# Patient Record
Sex: MALE | NOT HISPANIC OR LATINO | Employment: OTHER | RURAL
[De-identification: names, ages, dates, MRNs, and addresses within clinical notes are randomized per-mention and may not be internally consistent; named-entity substitution may affect disease eponyms.]

---

## 2023-05-17 ENCOUNTER — OFFICE VISIT (OUTPATIENT)
Dept: FAMILY MEDICINE CLINIC | Facility: CLINIC | Age: 73
End: 2023-05-17
Payer: MEDICARE

## 2023-05-17 VITALS
WEIGHT: 249.2 LBS | BODY MASS INDEX: 35.68 KG/M2 | HEIGHT: 70 IN | SYSTOLIC BLOOD PRESSURE: 130 MMHG | DIASTOLIC BLOOD PRESSURE: 72 MMHG | HEART RATE: 91 BPM | RESPIRATION RATE: 16 BRPM | TEMPERATURE: 98.4 F | OXYGEN SATURATION: 98 %

## 2023-05-17 DIAGNOSIS — M54.16 LUMBAR RADICULOPATHY: ICD-10-CM

## 2023-05-17 DIAGNOSIS — Z87.891 HISTORY OF SMOKING: ICD-10-CM

## 2023-05-17 DIAGNOSIS — R01.1 HEART MURMUR: ICD-10-CM

## 2023-05-17 DIAGNOSIS — Z79.4 CONTROLLED TYPE 2 DIABETES MELLITUS WITH HYPERGLYCEMIA, WITH LONG-TERM CURRENT USE OF INSULIN: ICD-10-CM

## 2023-05-17 DIAGNOSIS — F51.01 PRIMARY INSOMNIA: ICD-10-CM

## 2023-05-17 DIAGNOSIS — E11.65 CONTROLLED TYPE 2 DIABETES MELLITUS WITH HYPERGLYCEMIA, WITH LONG-TERM CURRENT USE OF INSULIN: ICD-10-CM

## 2023-05-17 DIAGNOSIS — N18.31 STAGE 3A CHRONIC KIDNEY DISEASE: ICD-10-CM

## 2023-05-17 DIAGNOSIS — E78.5 HYPERLIPIDEMIA, UNSPECIFIED HYPERLIPIDEMIA TYPE: ICD-10-CM

## 2023-05-17 DIAGNOSIS — I10 PRIMARY HYPERTENSION: Primary | ICD-10-CM

## 2023-05-17 DIAGNOSIS — G83.4: ICD-10-CM

## 2023-05-17 RX ORDER — BACLOFEN 5 MG/1
1 TABLET ORAL 3 TIMES DAILY
COMMUNITY
Start: 2023-04-13 | End: 2023-05-24 | Stop reason: SDUPTHER

## 2023-05-17 RX ORDER — FENOFIBRATE 160 MG/1
1 TABLET ORAL DAILY
COMMUNITY
Start: 2023-04-13

## 2023-05-17 RX ORDER — PANTOPRAZOLE SODIUM 40 MG/1
1 TABLET, DELAYED RELEASE ORAL DAILY
COMMUNITY
Start: 2023-04-13

## 2023-05-17 RX ORDER — INSULIN GLARGINE 100 [IU]/ML
INJECTION, SOLUTION SUBCUTANEOUS
COMMUNITY
End: 2023-05-18 | Stop reason: SDUPTHER

## 2023-05-17 RX ORDER — TRAMADOL HYDROCHLORIDE 50 MG/1
TABLET ORAL
COMMUNITY
Start: 2023-05-02 | End: 2023-05-24 | Stop reason: SDUPTHER

## 2023-05-17 RX ORDER — ATORVASTATIN CALCIUM 20 MG/1
1 TABLET, FILM COATED ORAL DAILY
COMMUNITY
Start: 2023-04-13

## 2023-05-17 RX ORDER — METOPROLOL TARTRATE 100 MG/1
1 TABLET ORAL DAILY
COMMUNITY
Start: 2023-04-13

## 2023-05-17 RX ORDER — LORAZEPAM 0.5 MG/1
TABLET ORAL
Qty: 30 TABLET | Refills: 0 | Status: CANCELLED | OUTPATIENT
Start: 2023-05-17

## 2023-05-17 RX ORDER — AMLODIPINE BESYLATE 10 MG/1
1 TABLET ORAL DAILY
COMMUNITY
Start: 2023-04-14

## 2023-05-17 RX ORDER — PREGABALIN 150 MG/1
1 CAPSULE ORAL EVERY 12 HOURS SCHEDULED
COMMUNITY
Start: 2023-04-13

## 2023-05-17 RX ORDER — HYDROCHLOROTHIAZIDE 25 MG/1
TABLET ORAL
COMMUNITY
End: 2023-05-18 | Stop reason: SDUPTHER

## 2023-05-17 RX ORDER — HYDROXYZINE HYDROCHLORIDE 25 MG/1
25 TABLET, FILM COATED ORAL EVERY 8 HOURS PRN
COMMUNITY
Start: 2023-05-13

## 2023-05-17 NOTE — ASSESSMENT & PLAN NOTE
Patient was followed and approved for neurosurgery prior to leaving [place].  MRI reviewed in office today showing L3-5 circumferential disc bulging, ligamentum flavum infolding and facet arthropathy contributing to left subarticular zone impingement of the traversing L5 nerve root, mild spinal canal stenosis and mild bilateral neural foraminal stenosis.   -We will refer to [facility] for further evaluation

## 2023-05-17 NOTE — PROGRESS NOTES
Office Note     Name: Jose A Villanueva    : 1950     MRN: [id number]     Chief Complaint  Establish Care    Subjective     History of Present Illness:  Jose A Villanueva is a 72 y.o. male who presents today to establish care.  He has medical issues including hypertension, lumbar radiculopathy, hyperlipidemia, type 2 diabetes and CKD stage III.  He recently moved to the area from [place].  He checks his fasting a.m. glucose once daily with average readings of 127.  Blood pressure is well controlled, 130/72 in office today.  He is currently utilizing statin and fenofibrate therapy for hyperlipidemia.  He has longstanding pattern of insomnia which has proven to find good benefit from nightly lorazepam.  He reports being followed by nephrology with CKD stage III he is a previous 1-1/2 pack/day smoker, achieving cessation in .  He has well-documented significant lumbar radiculopathy with spinal stenosis.  Followed by neurosurgery in [place], he was preparing to pursue surgical intervention, will need referral to establish care with local neurosurgeon.  Pain currently well controlled with tramadol, baclofen and Lyrica.  He has no particular complaints or concerns today  Review of Systems   Constitutional: Negative for fatigue.   Eyes: Negative for visual disturbance.   Respiratory: Negative for cough, chest tightness, shortness of breath and wheezing.    Cardiovascular: Negative for chest pain, palpitations and leg swelling.   Gastrointestinal: Negative for abdominal pain, constipation, diarrhea, nausea, vomiting and indigestion.   Endocrine: Negative for polydipsia and polyuria.   Genitourinary: Negative for difficulty urinating, flank pain and hematuria.   Musculoskeletal: Positive for arthralgias and back pain.   Neurological: Positive for numbness. Negative for dizziness, weakness and headache.   Hematological: Does not bruise/bleed easily.   Psychiatric/Behavioral: Negative for depressed mood. The patient is not  "nervous/anxious.        Objective     Past Medical History:   Diagnosis Date   • Anxiety    • Arthritis    • Depression    • Diabetes mellitus    • GERD (gastroesophageal reflux disease)    • Hyperlipidemia    • Hypertension      Past Surgical History:   Procedure Laterality Date   • CHOLECYSTECTOMY     • KNEE ARTHROSCOPY Bilateral    • REPLACEMENT TOTAL HIP ONCOLOGIC Right    • TONSILLECTOMY       Family History   Problem Relation Age of Onset   • Arthritis Mother    • Hypertension Mother    • Arthritis Father    • Hypertension Father        Vital Signs  /72 (BP Location: Left arm, Patient Position: Sitting, Cuff Size: Adult)   Pulse 91   Temp 98.4 °F (36.9 °C) (Temporal)   Resp 16   Ht 177.8 cm (70\")   Wt 113 kg (249 lb 3.2 oz)   SpO2 98%   BMI 35.76 kg/m²   Estimated body mass index is 35.76 kg/m² as calculated from the following:    Height as of this encounter: 177.8 cm (70\").    Weight as of this encounter: 113 kg (249 lb 3.2 oz).    Physical Exam  HENT:      Head: Normocephalic and atraumatic.      Right Ear: Tympanic membrane, ear canal and external ear normal.      Left Ear: Tympanic membrane, ear canal and external ear normal.      Nose: Nose normal.      Mouth/Throat:      Pharynx: Oropharynx is clear.   Eyes:      Conjunctiva/sclera: Conjunctivae normal.   Cardiovascular:      Rate and Rhythm: Normal rate and regular rhythm.      Pulses: Normal pulses.      Heart sounds: Murmur heard.   Pulmonary:      Effort: Pulmonary effort is normal.      Breath sounds: Normal breath sounds.   Abdominal:      Palpations: Abdomen is soft.   Musculoskeletal:         General: Tenderness present.      Cervical back: Neck supple.   Skin:     General: Skin is warm and dry.   Neurological:      Mental Status: He is alert and oriented to person, place, and time.   Psychiatric:         Mood and Affect: Mood normal.         Behavior: Behavior normal.         Thought Content: Thought content normal.         " Judgment: Judgment normal.        POCT Results (if applicable):  No results found for this or any previous visit.         Assessment and Plan     Diagnoses and all orders for this visit:    1. Primary hypertension (Primary)  Assessment & Plan:  Excellent control on current medications.  He 130/72 in office today.  1.  Amlodipine 10 mg once daily  2.  Hydrochlorothiazide 25 mg once daily  3.  Metoprolol 100 mg once daily    Orders:  -     TSH Rfx On Abnormal To Free T4; Future    2. Hyperlipidemia, unspecified hyperlipidemia type  Assessment & Plan:  Patient currently utilizing atorvastatin 20 mg once daily and fenofibrate 160 mg once daily    Orders:  -     Hepatitis C Antibody; Future  -     Lipid Panel; Future    3. Lumbar radiculopathy  Assessment & Plan:  Patient was followed and approved for neurosurgery prior to leaving [place].  MRI reviewed in office today showing L3-5 circumferential disc bulging, ligamentum flavum infolding and facet arthropathy contributing to left subarticular zone impingement of the traversing L5 nerve root, mild spinal canal stenosis and mild bilateral neural foraminal stenosis.   -We will refer to [facility] for further evaluation    Orders:  -     Ambulatory Referral to Neurosurgery    4. Incomplete cauda equina syndrome  Assessment & Plan:  Buckling of the cauda equina nerve roots due to lumbar spinal stenosis      5. Controlled type 2 diabetes mellitus with hyperglycemia, with long-term current use of insulin  Assessment & Plan:  Checks glucose once daily. 127 average AM. Taken off metformin by nephrology. Started on tradjenta and insulin.  Checking A1c in office today.  We will need to establish care with local eye doctor, will need evaluation for diabetic retinopathy.    Orders:  -     Hemoglobin A1c; Future    6. Stage 3a chronic kidney disease  Assessment & Plan:  Patient reports being followed by nephrology in [place].  However, he is unsure of his baseline renal  function.  Need to evaluate nephrotoxic agents such as NSAIDs.    Orders:  -     CBC w AUTO Differential; Future  -     Comprehensive Metabolic Panel; Future    7. History of smoking  Assessment & Plan:  Stopped 1999. 20 years 1.5ppd      8. Heart murmur  Assessment & Plan:  Patient reports murmur has been present since birth, he has had full cardiovascular evaluation without abnormal findings per his report.  This included heart catheterization that required no intervention      9. Primary insomnia  Assessment & Plan:  Patient reports longstanding history of insomnia.  He has utilized lorazepam nightly with good benefit.  Reviewed Jack with satisfactory findings.  Reviewed controlled substance agreement, signed by patient.    Orders:  -     LORazepam (Ativan) 0.5 MG tablet; Take 1 tablet by mouth Every 8 (Eight) Hours As Needed for Anxiety.  Dispense: 30 tablet; Refill: 0    Class 2 Severe Obesity (BMI >=35 and <=39.9). Obesity-related health conditions include the following: hypertension, diabetes mellitus and dyslipidemias. Obesity is unchanged. BMI is is above average; BMI management plan is completed. We discussed portion control and increasing exercise.    I spent 45 minutes caring for Jose A on this date of service. This time includes time spent by me in the following activities:preparing for the visit, performing a medically appropriate examination and/or evaluation , counseling and educating the patient/family/caregiver, ordering medications, tests, or procedures and documenting information in the medical record    Vaccine Counseling:  “Discussed risks/benefits to vaccination, reviewed components of the vaccine, discussed VIS, discussed informed consent, informed consent obtained. Patient/Parent was allowed to accept or refuse vaccine. Questions answered to satisfactory state of patient/Parent. We reviewed typical age appropriate and seasonally appropriate vaccinations. Reviewed immunization history and  updated state vaccination form as needed. Patient was counseled on COVID-19  Zoster      Advanced Care Planning:   Patient does not have an advance directive, information provided.    Follow Up  Return in about 3 months (around 8/17/2023) for Annual physical.    Radha Asher, APRN

## 2023-05-17 NOTE — ASSESSMENT & PLAN NOTE
Checks glucose once daily. 127 average AM. Taken off metformin by nephrology. Started on tradjenta and insulin.  Checking A1c in office today.  We will need to establish care with local eye doctor, will need evaluation for diabetic retinopathy.

## 2023-05-18 ENCOUNTER — TELEPHONE (OUTPATIENT)
Dept: FAMILY MEDICINE CLINIC | Facility: CLINIC | Age: 73
End: 2023-05-18
Payer: MEDICARE

## 2023-05-18 NOTE — TELEPHONE ENCOUNTER
Caller: Jose A Villanueva    Relationship: Self    Best call back number: [phone]    Requested Prescriptions:   Requested Prescriptions      No prescriptions requested or ordered in this encounter        Pharmacy where request should be sent: [facility] - [place] - [address] [phone] [facility] [phone] FX     Last office visit with prescribing clinician: 5/17/2023   Last telemedicine visit with prescribing clinician: 5/17/2023   Next office visit with prescribing clinician: 8/17/2023     Additional details provided by patient: PATIENT STATES HE WAS SUPPOSED TO HAVE LANTUS, HYDROCHLOROTHIAZIDE, ATAVAN AND TRAZADONE PRESCRIBED YESTERDAY    Does the patient have less than a 3 day supply:  [] Yes  [] No    Would you like a call back once the refill request has been completed: [x] Yes [] No    If the office needs to give you a call back, can they leave a voicemail: [] Yes [] No    Wendy Mitchell Rep   05/18/23 09:43 EDT

## 2023-05-19 ENCOUNTER — TELEPHONE (OUTPATIENT)
Dept: FAMILY MEDICINE CLINIC | Facility: CLINIC | Age: 73
End: 2023-05-19

## 2023-05-19 ENCOUNTER — TELEPHONE (OUTPATIENT)
Dept: FAMILY MEDICINE CLINIC | Facility: CLINIC | Age: 73
End: 2023-05-19
Payer: MEDICARE

## 2023-05-19 RX ORDER — HYDROCHLOROTHIAZIDE 25 MG/1
25 TABLET ORAL DAILY
Qty: 90 TABLET | Refills: 2 | Status: SHIPPED | OUTPATIENT
Start: 2023-05-19

## 2023-05-19 RX ORDER — INSULIN GLARGINE 100 [IU]/ML
INJECTION, SOLUTION SUBCUTANEOUS
Qty: 15 ML | Refills: 2 | Status: SHIPPED | OUTPATIENT
Start: 2023-05-19

## 2023-05-19 NOTE — TELEPHONE ENCOUNTER
Caller: Jose A Villanueva    Relationship: Self    Best call back number: [phone]    What is the medical concern/diagnosis: SPINAL STENOSIS     What specialty or service is being requested: NEUROSURGEON     What is the provider, practice or medical service name: [facility]KEY [facility]AS RECOMMENDS THAT TAKES THE PATIENT'S INSURANCE     What is the office location: [place] [facility]   What is the office phone number: UNSURE     Any additional details: THE PATIENT REPORTS THAT HE DISCUSSED THE POSSIBILITY OF GETTING A REFERRAL AT HIS APPOINTMENT ON 05/17/2023 AND IS FOLLOWING UP TO REQUEST THE REFERRAL.    PLEASE CALL AND ADVISE.

## 2023-05-19 NOTE — TELEPHONE ENCOUNTER
Caller: Jose A Villanueva    Relationship: Self    Best call back number:  [phone]         What medication are you requesting: REQUESTING BOTH ATIVAN AND TRAZODONE     What are your current symptoms:     ATIVAN FOR ANXIETY AND   TRAZODONE FOR DIFFICULTY SLEEPING     How long have you been experiencing symptoms: SINCE 09/2022    Have you had these symptoms before:    [x] Yes  [] No    Have you been treated for these symptoms before:   [x] Yes  [] No    If a prescription is needed, what is your preferred pharmacy and phone number: [facility] - [facility] [place] - [address] [phone] [facility] [phone] FX     Additional notes:    THE PATIENT REPORT HE HAD AN APPOINTMENT WITH MICKEY MATIAS ON 05/17/2023 AND STATES HE WAS ADVISED THESE TWO MEDICATION WOULD BE CALLED IN FOR HIM, BUT REPORTS THE [facility] IN [facility] DID NOT GET THESE MEDICATION SENT TO THEM.    PLEASE CALL AND ADVISE

## 2023-05-22 PROBLEM — R01.1 HEART MURMUR: Status: ACTIVE | Noted: 2023-05-22

## 2023-05-22 PROBLEM — F51.01 PRIMARY INSOMNIA: Status: ACTIVE | Noted: 2023-05-22

## 2023-05-22 RX ORDER — LORAZEPAM 0.5 MG/1
0.5 TABLET ORAL EVERY 8 HOURS PRN
Qty: 30 TABLET | Refills: 0 | Status: SHIPPED | OUTPATIENT
Start: 2023-05-22

## 2023-05-22 NOTE — ASSESSMENT & PLAN NOTE
Patient reports being followed by nephrology in [place].  However, he is unsure of his baseline renal function.  Need to evaluate nephrotoxic agents such as NSAIDs.

## 2023-05-22 NOTE — ASSESSMENT & PLAN NOTE
Patient reports longstanding history of insomnia.  He has utilized lorazepam nightly with good benefit.  Reviewed Jack with satisfactory findings.  Reviewed controlled substance agreement, signed by patient.

## 2023-05-22 NOTE — ASSESSMENT & PLAN NOTE
Excellent control on current medications.  He 130/72 in office today.  1.  Amlodipine 10 mg once daily  2.  Hydrochlorothiazide 25 mg once daily  3.  Metoprolol 100 mg once daily

## 2023-05-22 NOTE — ASSESSMENT & PLAN NOTE
Patient reports murmur has been present since birth, he has had full cardiovascular evaluation without abnormal findings per his report.  This included heart catheterization that required no intervention

## 2023-05-24 DIAGNOSIS — M47.816 LUMBAR SPONDYLOSIS: Primary | ICD-10-CM

## 2023-05-24 RX ORDER — TRAMADOL HYDROCHLORIDE 50 MG/1
50 TABLET ORAL EVERY 6 HOURS PRN
Qty: 60 TABLET | Refills: 0 | Status: SHIPPED | OUTPATIENT
Start: 2023-05-24

## 2023-05-24 RX ORDER — BACLOFEN 5 MG/1
5 TABLET ORAL EVERY 8 HOURS SCHEDULED
Qty: 90 TABLET | Refills: 0 | Status: SHIPPED | OUTPATIENT
Start: 2023-05-24

## 2023-05-24 NOTE — TELEPHONE ENCOUNTER
Caller: Jose A Villanueva    Relationship: Self    Best call back number: [phone]    Requested Prescriptions:   Requested Prescriptions     Pending Prescriptions Disp Refills   • Baclofen (LIORESAL) 5 MG tablet 90 tablet      Sig: Take 1 tablet by mouth 3 (Three) Times a Day.   • traMADol (ULTRAM) 50 MG tablet          Pharmacy where request should be sent: [facility] - [place], [place] - [address] [phone] [facility] [phone] FX     Last office visit with prescribing clinician: 5/17/2023   Last telemedicine visit with prescribing clinician: Visit date not found   Next office visit with prescribing clinician: 8/17/2023     Additional details provided by patient:     Does the patient have less than a 3 day supply:  [x] Yes  [] No    Wendy Lund Rep   05/24/23 14:18 EDT

## 2023-05-30 ENCOUNTER — CLINICAL SUPPORT (OUTPATIENT)
Dept: FAMILY MEDICINE CLINIC | Facility: CLINIC | Age: 73
End: 2023-05-30

## 2023-05-30 DIAGNOSIS — Z79.4 CONTROLLED TYPE 2 DIABETES MELLITUS WITH HYPERGLYCEMIA, WITH LONG-TERM CURRENT USE OF INSULIN: ICD-10-CM

## 2023-05-30 DIAGNOSIS — I10 PRIMARY HYPERTENSION: ICD-10-CM

## 2023-05-30 DIAGNOSIS — E11.65 CONTROLLED TYPE 2 DIABETES MELLITUS WITH HYPERGLYCEMIA, WITH LONG-TERM CURRENT USE OF INSULIN: ICD-10-CM

## 2023-05-30 DIAGNOSIS — N18.31 STAGE 3A CHRONIC KIDNEY DISEASE: ICD-10-CM

## 2023-05-30 DIAGNOSIS — E78.5 HYPERLIPIDEMIA, UNSPECIFIED HYPERLIPIDEMIA TYPE: ICD-10-CM

## 2023-05-30 PROCEDURE — 36415 COLL VENOUS BLD VENIPUNCTURE: CPT | Performed by: NURSE PRACTITIONER

## 2023-05-30 NOTE — PROGRESS NOTES
Venipuncture Blood Specimen Collection  Venipuncture performed in left arm by Sarah Panchal MA with good hemostasis. Patient tolerated the procedure well without complications.   05/30/23   Sarah Panchal MA

## 2023-05-31 LAB
ALBUMIN SERPL-MCNC: 4.6 G/DL (ref 3.7–4.7)
ALBUMIN/GLOB SERPL: 1.8 {RATIO} (ref 1.2–2.2)
ALP SERPL-CCNC: 53 IU/L (ref 44–121)
ALT SERPL-CCNC: 16 IU/L (ref 0–44)
AST SERPL-CCNC: 22 IU/L (ref 0–40)
BILIRUB SERPL-MCNC: 0.6 MG/DL (ref 0–1.2)
BUN SERPL-MCNC: 21 MG/DL (ref 8–27)
BUN/CREAT SERPL: 13 (ref 10–24)
CALCIUM SERPL-MCNC: 10 MG/DL (ref 8.6–10.2)
CHLORIDE SERPL-SCNC: 104 MMOL/L (ref 96–106)
CHOLEST SERPL-MCNC: 168 MG/DL (ref 100–199)
CO2 SERPL-SCNC: 27 MMOL/L (ref 20–29)
CREAT SERPL-MCNC: 1.6 MG/DL (ref 0.76–1.27)
EGFRCR SERPLBLD CKD-EPI 2021: 46 ML/MIN/1.73
GLOBULIN SER CALC-MCNC: 2.5 G/DL (ref 1.5–4.5)
GLUCOSE SERPL-MCNC: 106 MG/DL (ref 70–99)
HBA1C MFR BLD: 8.6 % (ref 4.8–5.6)
HCV IGG SERPL QL IA: NON REACTIVE
HDLC SERPL-MCNC: 29 MG/DL
LDLC SERPL CALC-MCNC: 96 MG/DL (ref 0–99)
POTASSIUM SERPL-SCNC: 4.8 MMOL/L (ref 3.5–5.2)
PROT SERPL-MCNC: 7.1 G/DL (ref 6–8.5)
SODIUM SERPL-SCNC: 144 MMOL/L (ref 134–144)
TRIGL SERPL-MCNC: 254 MG/DL (ref 0–149)
TSH SERPL DL<=0.005 MIU/L-ACNC: 4.07 UIU/ML (ref 0.45–4.5)
VLDLC SERPL CALC-MCNC: 43 MG/DL (ref 5–40)

## 2023-06-01 ENCOUNTER — TELEPHONE (OUTPATIENT)
Dept: FAMILY MEDICINE CLINIC | Facility: CLINIC | Age: 73
End: 2023-06-01

## 2023-06-01 DIAGNOSIS — R79.89 ELEVATED SERUM CREATININE: Primary | ICD-10-CM

## 2023-06-01 RX ORDER — ATORVASTATIN CALCIUM 40 MG/1
40 TABLET, FILM COATED ORAL DAILY
Qty: 90 TABLET | Refills: 1 | Status: SHIPPED | OUTPATIENT
Start: 2023-06-01 | End: 2023-06-01 | Stop reason: SDUPTHER

## 2023-06-01 RX ORDER — GLIPIZIDE 5 MG/1
5 TABLET ORAL
Qty: 60 TABLET | Refills: 3 | Status: SHIPPED | OUTPATIENT
Start: 2023-06-01 | End: 2023-06-05 | Stop reason: SDUPTHER

## 2023-06-01 RX ORDER — ATORVASTATIN CALCIUM 40 MG/1
40 TABLET, FILM COATED ORAL DAILY
Qty: 90 TABLET | Refills: 1 | Status: SHIPPED | OUTPATIENT
Start: 2023-06-01 | End: 2023-06-05 | Stop reason: SDUPTHER

## 2023-06-01 RX ORDER — GLIPIZIDE 5 MG/1
5 TABLET ORAL
Qty: 60 TABLET | Refills: 3 | Status: SHIPPED | OUTPATIENT
Start: 2023-06-01 | End: 2023-06-01 | Stop reason: SDUPTHER

## 2023-06-01 NOTE — TELEPHONE ENCOUNTER
----- Message from BRENDA Phelan sent at 6/1/2023  9:37 AM EDT -----  Please call the patient regarding his abnormal result. His creatinine is 1.6, GFR 46. I know he was seeing a nephrologist in [place] so I would like to refer him to a nephrologist here in [place] to establish care, [facility]. His A1C is quite a bit above goal at 8.6%. I would like to add glipizide 5mg BID to his regimen, he will need to reduce his refined sugar and carb intake as well. His triglycerides remain elevated at 254. I am going to increase his atorvastatin to 40mg daily. He would also benefit from a daily omega-3 supplement if not already taking

## 2023-06-02 ENCOUNTER — TELEPHONE (OUTPATIENT)
Dept: FAMILY MEDICINE CLINIC | Facility: CLINIC | Age: 73
End: 2023-06-02
Payer: MEDICARE

## 2023-06-02 DIAGNOSIS — F51.01 PRIMARY INSOMNIA: ICD-10-CM

## 2023-06-02 NOTE — TELEPHONE ENCOUNTER
Caller: Jose A Villanueva    Relationship: Self    Best call back number: [phone]    Requested Prescriptions:   Requested Prescriptions      No prescriptions requested or ordered in this encounter      LORazepam (Ativan) 0.5 MG tablet    atorvastatin (Lipitor) 40 MG tablet    glipizide (Glucotrol) 5 MG tablet    Pharmacy where request should be sent:      [facility] - [place], [place] - [address] [phone] [facility] [phone]        Last office visit with prescribing clinician: 5/17/2023   Last telemedicine visit with prescribing clinician: Visit date not found   Next office visit with prescribing clinician: 8/17/2023     Additional details provided by patient:     ATORVASTATIN AND GLIPIZIDE WERE CALLED IN TO [facility] - PATIENT USES [facility]    Does the patient have less than a 3 day supply:    [x] Yes  [] No    Would you like a call back once the refill request has been completed: [] Yes [x] No    If the office needs to give you a call back, can they leave a voicemail: [] Yes [x] No    Thalia Haywood, PCT   06/02/23 09:44 EDT

## 2023-06-05 ENCOUNTER — TELEPHONE (OUTPATIENT)
Dept: FAMILY MEDICINE CLINIC | Facility: CLINIC | Age: 73
End: 2023-06-05
Payer: MEDICARE

## 2023-06-05 RX ORDER — ATORVASTATIN CALCIUM 40 MG/1
40 TABLET, FILM COATED ORAL DAILY
Qty: 90 TABLET | Refills: 1 | Status: SHIPPED | OUTPATIENT
Start: 2023-06-05

## 2023-06-05 RX ORDER — LORAZEPAM 0.5 MG/1
0.5 TABLET ORAL EVERY 8 HOURS PRN
Qty: 60 TABLET | Refills: 0 | Status: SHIPPED | OUTPATIENT
Start: 2023-06-05

## 2023-06-05 RX ORDER — GLIPIZIDE 5 MG/1
5 TABLET ORAL
Qty: 60 TABLET | Refills: 3 | Status: SHIPPED | OUTPATIENT
Start: 2023-06-05

## 2023-06-15 RX ORDER — BACLOFEN 5 MG/1
TABLET ORAL
Qty: 90 TABLET | Refills: 0 | Status: SHIPPED | OUTPATIENT
Start: 2023-06-15

## 2023-07-18 ENCOUNTER — HOSPITAL ENCOUNTER (OUTPATIENT)
Age: 73
End: 2023-07-18
Payer: MEDICARE

## 2023-07-18 DIAGNOSIS — N28.9: Primary | ICD-10-CM

## 2023-07-18 LAB
ALBUMIN LEVEL: 4.3 G/DL (ref 3.5–5)
ANION GAP SERPL CALC-SCNC: 12.5 MEQ/L (ref 5–15)
BUN SERPL-MCNC: 26 MG/DL (ref 9–20)
CALCIUM SPEC-MCNC: 9.3 MG/DL (ref 8.4–10.2)
CHLORIDE SPEC-SCNC: 104 MMOL/L (ref 98–107)
CO2 SERPL-SCNC: 29 MMOL/L (ref 22–30)
COLOR UR: YELLOW
CREAT BLD-SCNC: 1.4 MG/DL (ref 0.66–1.25)
ESTIMATED GLOMERULAR FILT RATE: 50 ML/MIN (ref 60–?)
GFR (AFRICAN AMERICAN): 60 ML/MIN (ref 60–?)
GLUCOSE UR QL STRIP.AUTO: (no result)
GLUCOSE: 189 MG/DL (ref 74–100)
HCT VFR BLD CALC: 42.6 % (ref 42–52)
HGB BLD-MCNC: 13.5 G/DL (ref 14.1–18)
MCHC RBC-ENTMCNC: 31.7 G/DL (ref 31.8–35.4)
MCV RBC: 92.9 FL (ref 80–94)
MEAN CORPUSCULAR HEMOGLOBIN: 29.5 PG (ref 27–31.2)
MICRO URNS: (no result)
PH UR: 6 [PH] (ref 5–8.5)
PHOSPHOROUS: 4.4 MG/DL (ref 2.5–4.5)
PLATELET # BLD: 189 K/MM3 (ref 142–424)
POTASSIUM: 4.5 MMOL/L (ref 3.5–5.1)
RBC # BLD AUTO: 4.58 M/MM3 (ref 4.6–6.2)
SODIUM SPEC-SCNC: 141 MMOL/L (ref 136–145)
SP GR UR: 1.02 (ref 1–1.03)
SQUAMOUS URNS QL MICRO: (no result) #/HPF (ref 0–5)
UROBILINOGEN UR QL: 1 EU/DL
WBC # BLD AUTO: 7.7 K/MM3 (ref 4.8–10.8)

## 2023-07-18 PROCEDURE — 82570 ASSAY OF URINE CREATININE: CPT

## 2023-07-18 PROCEDURE — 85025 COMPLETE CBC W/AUTO DIFF WBC: CPT

## 2023-07-18 PROCEDURE — 84155 ASSAY OF PROTEIN SERUM: CPT

## 2023-07-18 PROCEDURE — 80069 RENAL FUNCTION PANEL: CPT

## 2023-07-18 PROCEDURE — 81001 URINALYSIS AUTO W/SCOPE: CPT

## 2023-07-18 PROCEDURE — 36415 COLL VENOUS BLD VENIPUNCTURE: CPT

## 2023-07-28 DIAGNOSIS — F41.9 ANXIETY: ICD-10-CM

## 2023-07-28 RX ORDER — LORAZEPAM 1 MG/1
0.5 TABLET ORAL EVERY 8 HOURS PRN
Qty: 20 TABLET | Refills: 0 | OUTPATIENT
Start: 2023-07-28

## 2023-07-28 NOTE — TELEPHONE ENCOUNTER
The rx for lorazepam was sent 7/18/2023 by Radha kemp. I have denied this rx. I have sent in his needles. TF

## 2023-07-28 NOTE — TELEPHONE ENCOUNTER
Caller: Lester Villanuevaer    Relationship: Self    Best call back number: [phone]     Requested Prescriptions: SOLOSTAR PEN NEEDLES ALSO NEEDED. DOES NOT COME WITH THOSE   Requested Prescriptions     Pending Prescriptions Disp Refills    LORazepam (Ativan) 1 MG tablet 20 tablet 0     Sig: Take 0.5 tablets by mouth Every 8 (Eight) Hours As Needed for Anxiety.        Pharmacy where request should be sent: [facility] [place] [facility] [address] [phone] [facility] [phone]      Last office visit with prescribing clinician: 5/17/2023   Last telemedicine visit with prescribing clinician: Visit date not found   Next office visit with prescribing clinician: 8/17/2023     Additional details provided by patient: HAS TWO DAYS LEFT     Does the patient have less than a 3 day supply:  [x] Yes  [] No    Would you like a call back once the refill request has been completed: [] Yes [x] No    If the office needs to give you a call back, can they leave a voicemail: [] Yes [x] No    Wendy Morales   07/28/23 13:25 EDT

## 2023-08-08 ENCOUNTER — OFFICE VISIT (OUTPATIENT)
Dept: NEUROSURGERY | Facility: CLINIC | Age: 73
End: 2023-08-08
Payer: MEDICARE

## 2023-08-08 ENCOUNTER — HOSPITAL ENCOUNTER (OUTPATIENT)
Dept: GENERAL RADIOLOGY | Facility: HOSPITAL | Age: 73
Discharge: HOME | End: 2023-08-08
Admitting: PHYSICIAN ASSISTANT
Payer: MEDICARE

## 2023-08-08 VITALS — HEIGHT: 70 IN | WEIGHT: 270.2 LBS | TEMPERATURE: 97.3 F | BODY MASS INDEX: 38.68 KG/M2

## 2023-08-08 DIAGNOSIS — M48.062 SPINAL STENOSIS, LUMBAR REGION, WITH NEUROGENIC CLAUDICATION: ICD-10-CM

## 2023-08-08 DIAGNOSIS — M54.16 LUMBAR RADICULOPATHY: Primary | ICD-10-CM

## 2023-08-08 DIAGNOSIS — M54.16 LUMBAR RADICULOPATHY: ICD-10-CM

## 2023-08-08 PROCEDURE — 99204 OFFICE O/P NEW MOD 45 MIN: CPT | Performed by: PHYSICIAN ASSISTANT

## 2023-08-08 PROCEDURE — 72120 X-RAY BEND ONLY L-S SPINE: CPT

## 2023-08-08 NOTE — PROGRESS NOTES
Patient: Jose A Villanueva  : 1950    Primary Care Provider: Radha Asher APRN      Chief Complaint: Low back and left leg pain    History of Present Illness:       Patient is a nice 72-year-old gentleman who recently moved here from [place].  He saw neurosurgeon last fall who recommended a lumbar laminectomy but did not have anyone there to help take care of him.  Patient is a diabetic and was taken off of metformin secondary to kidney issue.  He states that NSAIDs really helped his pain but the nephrologist took him off of the NSAIDs as well.    Patient states that his pain is quite a bit better and he is off the walker and was wondering if this could just get better with time.  Based off of his MRI he does have some advanced level degenerative changes in the L3-4 facets as well as the interspinous bursa looks like it probably has a cyst in it at the L3-4 area.  I think this is likely the cause of a lot of his low back pain when he is laying down at night.  Patient does have pretty predictable pain whenever he is walking that radiates down the lateral aspect of his thigh.  Patient also has diabetic peripheral neuropathy in the left foot and has chronic burning in his left foot    Patient tried and failed pain management in the fall which correlated into his surgical visit.  Patient also did physical therapy in May 2023.    He states the oxycodone helped for the month that he got it from the pain doctor in [place].  Tramadol does not help at all.  He does notice that movement aggravates his pain sitting gets somewhat better.    Patient is here with an MRI for review    Review of Systems   Constitutional:  Positive for activity change. Negative for appetite change, chills, diaphoresis, fatigue, fever and unexpected weight change.   HENT:  Positive for hearing loss. Negative for congestion, dental problem, drooling, ear discharge, ear pain, facial swelling, mouth sores, nosebleeds, postnasal drip, rhinorrhea,  sinus pressure, sinus pain, sneezing, sore throat, tinnitus, trouble swallowing and voice change.    Eyes:  Negative for photophobia, pain, discharge, redness, itching and visual disturbance.   Respiratory:  Positive for apnea. Negative for cough, choking, chest tightness, shortness of breath, wheezing and stridor.    Cardiovascular:  Negative for chest pain, palpitations and leg swelling.   Gastrointestinal:  Negative for abdominal distention, abdominal pain, anal bleeding, blood in stool, constipation, diarrhea, nausea, rectal pain and vomiting.   Endocrine: Negative for cold intolerance, heat intolerance, polydipsia, polyphagia and polyuria.   Genitourinary:  Negative for decreased urine volume, difficulty urinating, dysuria, enuresis, flank pain, frequency, genital sores, hematuria, penile discharge, penile pain, penile swelling, scrotal swelling, testicular pain and urgency.   Musculoskeletal:  Positive for arthralgias, back pain and gait problem. Negative for joint swelling, myalgias, neck pain and neck stiffness.   Skin:  Negative for color change, pallor, rash and wound.   Allergic/Immunologic: Positive for environmental allergies. Negative for food allergies and immunocompromised state.   Neurological:  Positive for weakness. Negative for dizziness, tremors, seizures, syncope, facial asymmetry, speech difficulty, light-headedness, numbness and headaches.   Hematological:  Negative for adenopathy. Does not bruise/bleed easily.   Psychiatric/Behavioral:  Positive for sleep disturbance. Negative for agitation, behavioral problems, confusion, decreased concentration, dysphoric mood, hallucinations, self-injury and suicidal ideas. The patient is nervous/anxious. The patient is not hyperactive.    All other systems reviewed and are negative.    Past Medical History:     Past Medical History:   Diagnosis Date    Alcoholism 1992    in remission    Anxiety     Arthritis     Asthma 2015    sleep apnea/APAP     "Cervical disc disorder     Depression     Diabetes mellitus     GERD (gastroesophageal reflux disease)     Hyperlipidemia     Hypertension     Low back pain     casual/ occasional    Peripheral neuropathy     Substance abuse     alcoholism    Thoracic disc disorder        Family History:     Family History   Problem Relation Age of Onset    Arthritis Mother     Hypertension Mother     COPD Mother             Arthritis Father     Hypertension Father     Hyperlipidemia Father             Alcohol abuse Brother     Drug abuse Brother             Early death Brother         51 yo       Social History:    reports that he quit smoking about 24 years ago. His smoking use included cigarettes. He started smoking about 50 years ago. He has never used smokeless tobacco. He reports that he does not currently use alcohol. He reports current drug use.   SMOKING STATUS: Non-smoker    Surgical History:     Past Surgical History:   Procedure Laterality Date    CHOLECYSTECTOMY      EPIDURAL BLOCK      by pain doctor    KNEE ARTHROSCOPY Bilateral     REPLACEMENT TOTAL HIP ONCOLOGIC Right     TONSILLECTOMY         Allergies:   Cerumenex [trolamine (triethanolamine)]    Physical Exam:    Vital Signs:Temp 97.3 øF (36.3 øC) (Infrared)   Ht 177.8 cm (70\")   Wt 123 kg (270 lb 3.2 oz)   BMI 38.77 kg/mý    BMI: Body mass index is 38.77 kg/mý.     GENERAL:           The patient is in no acute distress, and is able to answer all questions appropriately.    Neck:          Supple without lymphadenopathy    Cardiovascular:       Peripheral pulses 2+ in the upper extremities at the radial artery    Pedal pulses difficult to palpate secondary to fluid/habitus    Lungs:         Breathing unlabored    Musculoskeletal:            strength is 5 out of 5 bilaterally.        Shoulder abduction is 5 out of 5.         Dorsiflexion is 5/5 Bilaterally       Plantarflexion is 5/5 bilaterally       Hip Flexion " 5/5 bilaterally.         The patient's gait is normal without antalgia.  Lower extremity swelling    Neurologic:          The patient is alert and oriented by 3.          Pupils are equal and reactive to light.         Visual fields are full.              Sensation is equal bilaterally with no deficit.           Reflexes:  2+ through out    CRANIAL NERVES:         Deferred    Medical Decision Making    Data Review:   MRI as described in HPI.  Patient has fairly advanced levels of degeneration especially at the L3-4 level with facet widening and hypertrophy.  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  Patient does not have any flexion-extension x-rays    Diagnosis:   Neurogenic claudication  Left leg pain  Diabetes  Obesity    Treatment Options:   Patient is going to get an A1c recheck as well as flexion-extension x-ray and see me back.  Patient's most recent A1c was 8.5.  Patient understands that he needs to have a less than 8 to be considered for surgery.    From my standpoint a flexion-extension x-ray is needed secondary to the evidence that he likely has some pathologic motion.  He may require fusion if that is the case we may ask him to try to lose a little weight prior to proceeding with surgery.  Patient does note that his pain is somewhat better than what it was in [place] so possibility of temporizing his pain with pain management may not be out of the question.     We will see him back after the flexion-extension x-ray.      Diagnosis Plan   1. Lumbar radiculopathy  Ambulatory Referral to Pain Management Clinic    XR Spine Lumbar Flex & Ext      2. Spinal stenosis, lumbar region, with neurogenic claudication

## 2023-08-09 ENCOUNTER — TELEPHONE (OUTPATIENT)
Dept: FAMILY MEDICINE CLINIC | Facility: CLINIC | Age: 73
End: 2023-08-09
Payer: MEDICARE

## 2023-08-09 DIAGNOSIS — F41.9 ANXIETY: ICD-10-CM

## 2023-08-09 DIAGNOSIS — F51.01 PRIMARY INSOMNIA: ICD-10-CM

## 2023-08-09 RX ORDER — LORAZEPAM 1 MG/1
0.5 TABLET ORAL EVERY 8 HOURS PRN
Qty: 35 TABLET | Refills: 0 | Status: SHIPPED | OUTPATIENT
Start: 2023-08-09

## 2023-08-09 RX ORDER — LORAZEPAM 1 MG/1
1 TABLET ORAL EVERY 8 HOURS PRN
OUTPATIENT
Start: 2023-08-09

## 2023-08-09 NOTE — TELEPHONE ENCOUNTER
Caller: Jose A Villanueva    Relationship: Self    Best call back number: [phone]     What medication are you requesting: TRAJENTA    If a prescription is needed, what is your preferred pharmacy and phone number: [id number] - [place], [place] - [address] AT [address] & [facility] - [phone] [facility] [phone] FX     Additional notes:

## 2023-08-09 NOTE — TELEPHONE ENCOUNTER
I do not see the patient on this medicine, have called the patient and left a message for him to return my phone call

## 2023-08-09 NOTE — TELEPHONE ENCOUNTER
PHONE CALL FROM PATIENT.  PHARMACY DOES NOT CARRY ENOUGH PILLS.  WOULD LIKE THE PRESCRIPTION SENT TO     [facility]    CALL IF NEEDED   PATIENT OUT OF MEDICATION.

## 2023-08-10 DIAGNOSIS — E11.65 CONTROLLED TYPE 2 DIABETES MELLITUS WITH HYPERGLYCEMIA, WITH LONG-TERM CURRENT USE OF INSULIN: ICD-10-CM

## 2023-08-10 DIAGNOSIS — E78.5 HYPERLIPIDEMIA, UNSPECIFIED HYPERLIPIDEMIA TYPE: Primary | ICD-10-CM

## 2023-08-10 DIAGNOSIS — Z79.4 CONTROLLED TYPE 2 DIABETES MELLITUS WITH HYPERGLYCEMIA, WITH LONG-TERM CURRENT USE OF INSULIN: ICD-10-CM

## 2023-08-10 DIAGNOSIS — F51.01 PRIMARY INSOMNIA: Primary | ICD-10-CM

## 2023-08-10 DIAGNOSIS — I10 PRIMARY HYPERTENSION: ICD-10-CM

## 2023-08-10 RX ORDER — ATORVASTATIN CALCIUM 40 MG/1
40 TABLET, FILM COATED ORAL DAILY
Qty: 90 TABLET | Refills: 1 | Status: SHIPPED | OUTPATIENT
Start: 2023-08-10

## 2023-08-10 RX ORDER — GLIPIZIDE 5 MG/1
5 TABLET ORAL
Qty: 180 TABLET | Refills: 1 | Status: SHIPPED | OUTPATIENT
Start: 2023-08-10

## 2023-08-10 RX ORDER — AMLODIPINE BESYLATE 10 MG/1
10 TABLET ORAL DAILY
Qty: 90 TABLET | Refills: 1 | Status: SHIPPED | OUTPATIENT
Start: 2023-08-10

## 2023-08-10 RX ORDER — HYDROCHLOROTHIAZIDE 25 MG/1
25 TABLET ORAL DAILY
Qty: 90 TABLET | Refills: 2 | Status: SHIPPED | OUTPATIENT
Start: 2023-08-10

## 2023-08-17 ENCOUNTER — OFFICE VISIT (OUTPATIENT)
Dept: FAMILY MEDICINE CLINIC | Facility: CLINIC | Age: 73
End: 2023-08-17
Payer: MEDICARE

## 2023-08-17 VITALS
BODY MASS INDEX: 39.25 KG/M2 | WEIGHT: 274.2 LBS | SYSTOLIC BLOOD PRESSURE: 136 MMHG | RESPIRATION RATE: 18 BRPM | OXYGEN SATURATION: 96 % | DIASTOLIC BLOOD PRESSURE: 74 MMHG | HEIGHT: 70 IN | TEMPERATURE: 97.2 F | HEART RATE: 72 BPM

## 2023-08-17 DIAGNOSIS — M48.062 SPINAL STENOSIS, LUMBAR REGION, WITH NEUROGENIC CLAUDICATION: ICD-10-CM

## 2023-08-17 DIAGNOSIS — G47.39 OTHER SLEEP APNEA: ICD-10-CM

## 2023-08-17 DIAGNOSIS — Z12.5 PROSTATE CANCER SCREENING: ICD-10-CM

## 2023-08-17 DIAGNOSIS — N18.31 STAGE 3A CHRONIC KIDNEY DISEASE: ICD-10-CM

## 2023-08-17 DIAGNOSIS — I10 PRIMARY HYPERTENSION: ICD-10-CM

## 2023-08-17 DIAGNOSIS — E78.2 MIXED HYPERLIPIDEMIA: ICD-10-CM

## 2023-08-17 DIAGNOSIS — Z87.891 HISTORY OF SMOKING: ICD-10-CM

## 2023-08-17 DIAGNOSIS — Z12.11 COLON CANCER SCREENING: ICD-10-CM

## 2023-08-17 DIAGNOSIS — E11.65 CONTROLLED TYPE 2 DIABETES MELLITUS WITH HYPERGLYCEMIA, WITH LONG-TERM CURRENT USE OF INSULIN: ICD-10-CM

## 2023-08-17 DIAGNOSIS — Z79.4 CONTROLLED TYPE 2 DIABETES MELLITUS WITH HYPERGLYCEMIA, WITH LONG-TERM CURRENT USE OF INSULIN: ICD-10-CM

## 2023-08-17 DIAGNOSIS — F51.01 PRIMARY INSOMNIA: ICD-10-CM

## 2023-08-17 DIAGNOSIS — Z00.00 INITIAL MEDICARE ANNUAL WELLNESS VISIT: Primary | ICD-10-CM

## 2023-08-17 DIAGNOSIS — B35.6 FUNGAL INFECTION OF THE GROIN: ICD-10-CM

## 2023-08-17 RX ORDER — NYSTATIN 100000 [USP'U]/G
POWDER TOPICAL 3 TIMES DAILY
Qty: 30 G | Refills: 0 | Status: SHIPPED | OUTPATIENT
Start: 2023-08-17

## 2023-08-17 NOTE — ASSESSMENT & PLAN NOTE
Checking AM fasting glucose, 149 this morning, occassionally in the 200s.  We are rechecking A1c today, last A1c 8.6%.  Currently utilizing Lantus 60-80 units daily, glipizide 5 mg twice daily and Tradjenta 5 mg daily.  He reports a $400 co-pay monthly for the Tradjenta, we will investigate cheaper options for glucose control.  Up-to-date satisfactory diabetic foot exam.  He is in need of updated eye exam.  Obtaining urine microalbumin today

## 2023-08-17 NOTE — ASSESSMENT & PLAN NOTE
Patient reports long-term issues with fungal rash in suprapubic groin fold.  Will prescribe nystatin

## 2023-08-17 NOTE — PROGRESS NOTES
The ABCs of the Annual Wellness Visit  Initial Medicare Wellness Visit    Chief Complaint   Patient presents with    Medicare Wellness-Initial Visit     Subjective   History of Present Illness:  Jose A Villanueva is a 72 y.o. male who presents for an Initial Medicare Wellness Visit.    The following portions of the patient's history were reviewed and   updated as appropriate: allergies, current medications, past family history, past medical history, past social history, past surgical history, and problem list.     Compared to one year ago, the patient feels his physical   health is worse.    Compared to one year ago, the patient feels his mental   health is the same.    Recent Hospitalizations:  He was not admitted to the hospital during the last year.       Current Medical Providers:  Patient Care Team:  Radha Asher APRN as PCP - General (Family Medicine)    Outpatient Medications Prior to Visit   Medication Sig Dispense Refill    amLODIPine (NORVASC) 10 MG tablet Take 1 tablet by mouth Daily. 90 tablet 1    aspirin 81 MG oral suspension       atorvastatin (Lipitor) 40 MG tablet Take 1 tablet by mouth Daily. 90 tablet 1    Baclofen (LIORESAL) 5 MG tablet TAKE 1 TABLET BY MOUTH EVERY 8 HOURS 90 tablet 0    Blood Glucose Monitoring Suppl (D-Care Glucometer) w/Device kit 1 kit 3 (Three) Times a Day. Indications: may substitute for any glucometer kit that is approved and availabe 1 kit 0    fenofibrate 160 MG tablet Take 1 tablet by mouth Daily.      glipizide (Glucotrol) 5 MG tablet Take 1 tablet by mouth 2 (Two) Times a Day Before Meals. 180 tablet 1    hydroCHLOROthiazide (HYDRODIURIL) 25 MG tablet Take 1 tablet by mouth Daily. 90 tablet 2    Insulin Glargine (Lantus SoloStar) 100 UNIT/ML injection pen 60-80 units once daily E11.9 15 mL 2    linagliptin (TRADJENTA) 5 MG tablet tablet Take 1 tablet by mouth Daily. 90 tablet 1    LORazepam (Ativan) 1 MG tablet Take 0.5 tablets by mouth Every 8 (Eight) Hours As Needed  for Anxiety. 35 tablet 0    metoprolol tartrate (LOPRESSOR) 100 MG tablet Take 1 tablet by mouth Daily. (Patient taking differently: Take 1 tablet by mouth 2 (Two) Times a Day.) 30 tablet 2    pantoprazole (PROTONIX) 40 MG EC tablet Take 1 tablet by mouth Daily.      pregabalin (LYRICA) 150 MG capsule Take 1 capsule by mouth Every 12 (Twelve) Hours.      sertraline (ZOLOFT) 50 MG tablet Take 1 tablet by mouth Daily. 90 tablet 0     No facility-administered medications prior to visit.       No opioid medication identified on active medication list. I have reviewed chart for other potential  high risk medication/s and harmful drug interactions in the elderly.        Aspirin is on active medication list. Aspirin use is indicated based on review of current medical condition/s. Pros and cons of this therapy have been discussed today. Benefits of this medication outweigh potential harm.  Patient has been encouraged to continue taking this medication.  .      Patient Active Problem List   Diagnosis    Incomplete cauda equina syndrome    Lumbar radiculopathy    Controlled type 2 diabetes mellitus with hyperglycemia, with long-term current use of insulin    Stage 3a chronic kidney disease    History of smoking    Primary hypertension    Hyperlipidemia    Heart murmur    Primary insomnia    Spinal stenosis, lumbar region, with neurogenic claudication    Fungal infection of the groin    Other sleep apnea    Initial Medicare annual wellness visit     Advance Care Planning  Advance Directive is not on file.  ACP discussion was held with the patient during this visit. Patient does not have an advance directive, information provided.    Review of Systems   Constitutional:  Positive for fatigue. Negative for activity change and appetite change.   HENT:  Negative for congestion, sinus pressure and sore throat.    Eyes:  Negative for visual disturbance.   Respiratory:  Negative for cough, chest tightness, shortness of breath and  "wheezing.    Cardiovascular:  Negative for chest pain, palpitations and leg swelling.   Gastrointestinal:  Negative for abdominal pain, blood in stool, constipation, diarrhea, nausea and vomiting.   Endocrine: Negative for polydipsia and polyuria.   Genitourinary:  Negative for difficulty urinating and hematuria.   Musculoskeletal:  Positive for arthralgias, back pain and myalgias.   Skin:  Positive for rash.   Neurological:  Negative for dizziness, weakness, light-headedness and numbness.   Hematological:  Does not bruise/bleed easily.   Psychiatric/Behavioral:  Positive for sleep disturbance. Negative for agitation, self-injury and suicidal ideas. The patient is not nervous/anxious.       Objective       Vitals:    08/17/23 0936   BP: 136/74   BP Location: Left arm   Patient Position: Sitting   Cuff Size: Adult   Pulse: 72   Resp: 18   Temp: 97.2 øF (36.2 øC)   TempSrc: Temporal   SpO2: 96%   Weight: 124 kg (274 lb 3.2 oz)   Height: 177.8 cm (70\")     Estimated body mass index is 39.34 kg/mý as calculated from the following:    Height as of this encounter: 177.8 cm (70\").    Weight as of this encounter: 124 kg (274 lb 3.2 oz).           Does the patient have evidence of cognitive impairment? No    Physical Exam  Vitals reviewed.   Constitutional:       Appearance: Normal appearance.   HENT:      Head: Normocephalic and atraumatic.      Right Ear: Tympanic membrane, ear canal and external ear normal.      Left Ear: Tympanic membrane, ear canal and external ear normal.      Nose: Nose normal.      Mouth/Throat:      Mouth: Mucous membranes are moist.      Pharynx: Oropharynx is clear.   Eyes:      Conjunctiva/sclera: Conjunctivae normal.      Pupils: Pupils are equal, round, and reactive to light.   Cardiovascular:      Rate and Rhythm: Normal rate and regular rhythm.      Pulses: Normal pulses.      Heart sounds: Murmur heard.   Pulmonary:      Effort: Pulmonary effort is normal.      Breath sounds: Normal breath " sounds.   Abdominal:      General: Bowel sounds are normal. There is no distension.      Palpations: Abdomen is soft.      Tenderness: There is no abdominal tenderness. There is no guarding or rebound.      Hernia: A hernia is present.   Musculoskeletal:         General: Normal range of motion.      Cervical back: Normal range of motion and neck supple.      Lumbar back: Tenderness present. Negative right straight leg raise test and negative left straight leg raise test.   Skin:     General: Skin is warm and dry.      Capillary Refill: Capillary refill takes less than 2 seconds.      Findings: Rash present.   Neurological:      General: No focal deficit present.      Mental Status: He is alert and oriented to person, place, and time.     ECG 12 Lead    Date/Time: 2023 11:44 AM  Performed by: Radha Asher APRN  Authorized by: Radha Asher APRN   Previous ECG: no previous ECG available  Rhythm: sinus rhythm  Ectopy: infrequent PVCs  Rate: normal  Conduction: conduction normal  ST Segments: ST segments normal  T Waves: T waves normal  QRS axis: normal  Other: no other findings    Clinical impression: normal ECG    Right eye 20/20 corrected  Left eye 20/25 corrected  Diabetic Foot Exam Performed    Lab Results   Component Value Date    CHLPL 168 2023    TRIG 254 (H) 2023    HDL 29 (L) 2023    LDL 96 2023    VLDL 43 (H) 2023    HGBA1C 8.6 (H) 2023          HEALTH RISK ASSESSMENT    Smoking Status:  Social History     Tobacco Use   Smoking Status Former    Packs/day: 1.00    Years: 15.00    Pack years: 15.00    Types: Cigarettes    Start date: 1973    Quit date: 1999    Years since quittin.6   Smokeless Tobacco Never     Alcohol Consumption:  Social History     Substance and Sexual Activity   Alcohol Use Not Currently     Fall Risk Screen:    STEADI Fall Risk Assessment was completed, and patient is at LOW risk for falls.Assessment completed  on:2023    Depression Screen:       2023     9:38 AM   PHQ-2/PHQ-9 Depression Screening   Little Interest or Pleasure in Doing Things 0-->not at all   Feeling Down, Depressed or Hopeless 0-->not at all   PHQ-9: Brief Depression Severity Measure Score 0       Health Habits and Functional and Cognitive Screenin/17/2023     9:38 AM   Functional & Cognitive Status   Do you have difficulty preparing food and eating? No   Do you have difficulty bathing yourself, getting dressed or grooming yourself? No   Do you have difficulty using the toilet? No   Do you have trouble with steps or getting out of a bed or a chair? No   Current Diet Other   Dental Exam Not up to date   Eye Exam Not up to date   Exercise (times per week) 2 times per week   Current Exercises Include Walking   Do you need help using the phone?  No   Are you deaf or do you have serious difficulty hearing?  No   Do you need help to go to places out of walking distance? No   Do you need help shopping? No   Do you need help preparing meals?  No   Do you need help with housework?  No   Do you need help with laundry? No   Do you need help taking your medications? No   Do you need help managing money? No   Do you ever drive or ride in a car without wearing a seat belt? No   Have you felt unusual stress, anger or loneliness in the last month? No   Who do you live with? Sibling   If you need help, do you have trouble finding someone available to you? No   Have you been bothered in the last four weeks by sexual problems? No   Do you have difficulty concentrating, remembering or making decisions? No       Age-appropriate Screening Schedule:  Refer to the list below for future screening recommendations based on patient's age, sex and/or medical conditions. Orders for these recommended tests are listed in the plan section. The patient has been provided with a written plan.    Health Maintenance   Topic Date Due    URINE MICROALBUMIN  Never done     COVID-19 Vaccine (1) Never done    Pneumococcal Vaccine 65+ (1 - PCV) Never done    ZOSTER VACCINE (1 of 2) Never done    Hepatitis B (1 of 3 - Risk 3-dose series) Never done    COLORECTAL CANCER SCREENING  05/11/2023    TDAP/TD VACCINES (1 - Tdap) 05/17/2024 (Originally 8/26/1969)    INFLUENZA VACCINE  10/01/2023    DIABETIC EYE EXAM  10/20/2023    HEMOGLOBIN A1C  11/30/2023    LIPID PANEL  05/30/2024    ANNUAL WELLNESS VISIT  08/17/2024    DIABETIC FOOT EXAM  08/17/2024    HEPATITIS C SCREENING  Completed            Assessment & Plan   CMS Preventative Services Quick Reference  Risk Factors Identified During Encounter  Chronic Pain:  Patient has been evaluated by neurosurgery, now referred to pain management  The above risks/problems have been discussed with the patient.  Follow up actions/plans if indicated are seen below in the Assessment/Plan Section.  Pertinent information has been shared with the patient in the After Visit Summary.    Diagnoses and all orders for this visit:    1. Initial Medicare annual wellness visit (Primary)  Assessment & Plan:  Patient presents today for initial Medicare wellness visit.  Follow-up for chronic conditions including hypertension, insomnia, stage IIIa CKD, spinal stenosis, hyperlipidemia, PIPPA and type 2 diabetes.  He has been evaluated by neurosurgery locally after being told in [place] he would require back surgery for spinal stenosis and neurogenic claudication.  Recent neurosurgery suggestion is for pain management through epidural injection, currently not candidate for surgery due to obesity and uncontrolled type 2 diabetes.  Rechecking A1c today, last result 8.6%.  He is in need of sleep medicine referral to update PAP settings.  Ordering Cologuard today to update colon cancer screening, last Cologuard in 2020.  Screening PSA level to be drawn today as well.  Diabetic foot check performed and satisfactory.  He is still in need of updated diabetic eye exam.    Orders:  -      ECG 12 Lead    2. Stage 3a chronic kidney disease  Assessment & Plan:  Now seeing nephrology associatess with GFR 55 on last check.  As a result of decreased kidney function he has been taken off metformin by his nephrologist in [place] before relocating.  At that time his A1c has been suboptimally controlled.  Today we discussed need for tight glycemic and blood pressure control to preserve renal function.  I discussed avoidance of nephrotoxic agents including NSAIDs      3. Spinal stenosis, lumbar region, with neurogenic claudication  Assessment & Plan:  Patient has been evaluated by neurosurgery and now referred to pain management. Patient reports they would like to avoid surgery due to his weight and uncontrolled A1c. Per neurosurgery, based off of his MRI he does have some advanced level degenerative changes in the L3-4 facets as well as the interspinous bursa looks like it probably has a cyst in it at the L3-4 area. This is likely the cause of a lot of his low back pain when he is laying down at night. Patient does have pretty predictable pain whenever he is walking that radiates down the lateral aspect of his thigh. Patient also has diabetic peripheral neuropathy in the left foot and has chronic burning in his left foot   -Scheduled to begin epideral injections with pain management next week      4. Primary hypertension  Assessment & Plan:  Excellent control on current medications, 136/74 in office today.  Notes adequate blood pressure control readings from home as well.  -Continue amlodipine 10 mg once daily  -Continue HCTZ 25 mg once daily  -Continue metoprolol 100 mg twice daily      5. Primary insomnia  Assessment & Plan:  Longstanding pattern of insomnia, he is utilizing lorazepam nightly with good benefit for a number of years.  Poor response in the past to medication such as trazodone, Ambien and Lunesta.  Reviewed Jack with satisfactory findings.  He has controlled substance agreement and  up-to-date UDS on file  -Continue lorazepam 0.5mg       6. Mixed hyperlipidemia  Assessment & Plan:  Last lipid check 3 months ago showing total cholesterol of 168, triglycerides 254, HDL 29 and LDL 96.  At that time he was utilizing atorvastatin 20 mg daily.  3 months ago atorvastatin increased to 40 mg daily  -Will recheck lipids today    Orders:  -     Lipid Panel; Future  -     Lipid Panel    7. History of smoking  Assessment & Plan:  Patient 1 pack/day smoker for 20 years, stopped in 1999      8. Controlled type 2 diabetes mellitus with hyperglycemia, with long-term current use of insulin  Assessment & Plan:  Checking AM fasting glucose, 149 this morning, occassionally in the 200s.  We are rechecking A1c today, last A1c 8.6%.  Currently utilizing Lantus 60-80 units daily, glipizide 5 mg twice daily and Tradjenta 5 mg daily.  He reports a $400 co-pay monthly for the Tradjenta, we will investigate cheaper options for glucose control.  Up-to-date satisfactory diabetic foot exam.  He is in need of updated eye exam.  Obtaining urine microalbumin today    Orders:  -     MicroAlbumin, Urine, Random - Urine, Clean Catch; Future  -     Hemoglobin A1c; Future  -     Hemoglobin A1c  -     MicroAlbumin, Urine, Random - Urine, Clean Catch    9. Prostate cancer screening  -     PSA Screen; Future  -     PSA Screen    10. Fungal infection of the groin  Assessment & Plan:  Patient reports long-term issues with fungal rash in suprapubic groin fold.  Will prescribe nystatin    Orders:  -     nystatin (MYCOSTATIN) 405356 UNIT/GM powder; Apply  topically to the appropriate area as directed 3 (Three) Times a Day.  Dispense: 30 g; Refill: 0    11. Other sleep apnea  Assessment & Plan:  Patient reports long-term diagnosis of sleep apnea.  Followed by pulmonology in [place].  He will need to establish care with a sleep medicine clinic here in town for reevaluation of Pap prescription.    Referral to sleep medicine,   [place]      Orders:  -     Ambulatory Referral to Cardiology    12. Colon cancer screening  -     Cologuard - Stool, Per Rectum; Future        Follow Up:  Return in about 3 months (around 11/17/2023) for Next scheduled follow up.     An After Visit Summary and PPPS were made available to the patient.

## 2023-08-17 NOTE — ASSESSMENT & PLAN NOTE
Patient has been evaluated by neurosurgery and now referred to pain management. Patient reports they would like to avoid surgery due to his weight and uncontrolled A1c. Per neurosurgery, based off of his MRI he does have some advanced level degenerative changes in the L3-4 facets as well as the interspinous bursa looks like it probably has a cyst in it at the L3-4 area. This is likely the cause of a lot of his low back pain when he is laying down at night. Patient does have pretty predictable pain whenever he is walking that radiates down the lateral aspect of his thigh. Patient also has diabetic peripheral neuropathy in the left foot and has chronic burning in his left foot   -Scheduled to begin epideral injections with pain management next week

## 2023-08-17 NOTE — ASSESSMENT & PLAN NOTE
Now seeing nephrology associatess with GFR 55 on last check.  As a result of decreased kidney function he has been taken off metformin by his nephrologist in [place] before relocating.  At that time his A1c has been suboptimally controlled.  Today we discussed need for tight glycemic and blood pressure control to preserve renal function.  I discussed avoidance of nephrotoxic agents including NSAIDs

## 2023-08-17 NOTE — ASSESSMENT & PLAN NOTE
Excellent control on current medications, 136/74 in office today.  Notes adequate blood pressure control readings from home as well.  -Continue amlodipine 10 mg once daily  -Continue HCTZ 25 mg once daily  -Continue metoprolol 100 mg twice daily

## 2023-08-17 NOTE — ASSESSMENT & PLAN NOTE
Patient presents today for initial Medicare wellness visit.  Follow-up for chronic conditions including hypertension, insomnia, stage IIIa CKD, spinal stenosis, hyperlipidemia, PIPPA and type 2 diabetes.  He has been evaluated by neurosurgery locally after being told in [place] he would require back surgery for spinal stenosis and neurogenic claudication.  Recent neurosurgery suggestion is for pain management through epidural injection, currently not candidate for surgery due to obesity and uncontrolled type 2 diabetes.  Rechecking A1c today, last result 8.6%.  He is in need of sleep medicine referral to update PAP settings.  Ordering Cologuard today to update colon cancer screening, last Cologuard in 2020.  Screening PSA level to be drawn today as well.  Diabetic foot check performed and satisfactory.  He is still in need of updated diabetic eye exam.

## 2023-08-17 NOTE — PROGRESS NOTES
Venipuncture Blood Specimen Collection  Venipuncture performed in right arm by Sarah Panchal MA with good hemostasis. Patient tolerated the procedure well without complications.   08/17/23   Sarah Panchal MA

## 2023-08-17 NOTE — ASSESSMENT & PLAN NOTE
Last lipid check 3 months ago showing total cholesterol of 168, triglycerides 254, HDL 29 and LDL 96.  At that time he was utilizing atorvastatin 20 mg daily.  3 months ago atorvastatin increased to 40 mg daily  -Will recheck lipids today

## 2023-08-17 NOTE — ASSESSMENT & PLAN NOTE
Patient reports long-term diagnosis of sleep apnea.  Followed by pulmonology in [place].  He will need to establish care with a sleep medicine clinic here in town for reevaluation of Pap prescription.    Referral to sleep medicine, Dr. Naqvi

## 2023-08-17 NOTE — ASSESSMENT & PLAN NOTE
Longstanding pattern of insomnia, he is utilizing lorazepam nightly with good benefit for a number of years.  Poor response in the past to medication such as trazodone, Ambien and Lunesta.  Reviewed Jack with satisfactory findings.  He has controlled substance agreement and up-to-date UDS on file  -Continue lorazepam 0.5mg

## 2023-08-18 LAB
CHOLEST SERPL-MCNC: 142 MG/DL (ref 100–199)
HBA1C MFR BLD: 8.3 % (ref 4.8–5.6)
HDLC SERPL-MCNC: 31 MG/DL
LDLC SERPL CALC-MCNC: 85 MG/DL (ref 0–99)
MICROALBUMIN UR-MCNC: 6.4 UG/ML
PSA SERPL-MCNC: 0.8 NG/ML (ref 0–4)
TRIGL SERPL-MCNC: 144 MG/DL (ref 0–149)
VLDLC SERPL CALC-MCNC: 26 MG/DL (ref 5–40)

## 2023-08-21 ENCOUNTER — TELEPHONE (OUTPATIENT)
Dept: FAMILY MEDICINE CLINIC | Facility: CLINIC | Age: 73
End: 2023-08-21
Payer: MEDICARE

## 2023-08-21 DIAGNOSIS — F41.9 ANXIETY: ICD-10-CM

## 2023-08-21 RX ORDER — LORAZEPAM 1 MG/1
1 TABLET ORAL EVERY 8 HOURS PRN
Qty: 60 TABLET | Refills: 0 | Status: SHIPPED | OUTPATIENT
Start: 2023-08-21

## 2023-08-21 RX ORDER — GLIPIZIDE 10 MG/1
10 TABLET ORAL
Qty: 180 TABLET | Refills: 1 | Status: SHIPPED | OUTPATIENT
Start: 2023-08-21

## 2023-08-21 NOTE — TELEPHONE ENCOUNTER
----- Message from BRENDA Phelan sent at 8/21/2023  8:14 AM EDT -----  Please let Jose A know I have reviewed his labs. All were satisfactory with the exception of his A1C which was 8.3%. We need to increase his glipizide to 10mg twice daily. I am sending a prescription in for the medication. During his three month follow-up we will recheck A1C and if not showing significant improvement would like to discuss weekly injection of monjouro as our next line of treatment.

## 2023-08-29 ENCOUNTER — OFFICE VISIT (OUTPATIENT)
Dept: CARDIOLOGY | Facility: CLINIC | Age: 73
End: 2023-08-29
Payer: MEDICARE

## 2023-08-29 VITALS
SYSTOLIC BLOOD PRESSURE: 122 MMHG | OXYGEN SATURATION: 95 % | WEIGHT: 276 LBS | BODY MASS INDEX: 39.51 KG/M2 | DIASTOLIC BLOOD PRESSURE: 64 MMHG | HEART RATE: 67 BPM | HEIGHT: 70 IN

## 2023-08-29 DIAGNOSIS — I25.10 CORONARY ARTERY DISEASE INVOLVING NATIVE CORONARY ARTERY OF NATIVE HEART WITHOUT ANGINA PECTORIS: ICD-10-CM

## 2023-08-29 DIAGNOSIS — I10 PRIMARY HYPERTENSION: ICD-10-CM

## 2023-08-29 DIAGNOSIS — I35.0 AORTIC STENOSIS, MILD: ICD-10-CM

## 2023-08-29 DIAGNOSIS — G47.33 OBSTRUCTIVE SLEEP APNEA: Primary | ICD-10-CM

## 2023-08-29 PROCEDURE — 1160F RVW MEDS BY RX/DR IN RCRD: CPT | Performed by: NURSE PRACTITIONER

## 2023-08-29 PROCEDURE — 3074F SYST BP LT 130 MM HG: CPT | Performed by: NURSE PRACTITIONER

## 2023-08-29 PROCEDURE — 99214 OFFICE O/P EST MOD 30 MIN: CPT | Performed by: NURSE PRACTITIONER

## 2023-08-29 PROCEDURE — 1159F MED LIST DOCD IN RCRD: CPT | Performed by: NURSE PRACTITIONER

## 2023-08-29 PROCEDURE — 3078F DIAST BP <80 MM HG: CPT | Performed by: NURSE PRACTITIONER

## 2023-08-29 NOTE — PROGRESS NOTES
[facility] Provider Note     Date:   2023   Name: Jose A Villanueva  :   1950  PCP: Radha Asher APRN    Chief Complaint   Patient presents with    Establish Care    Sleep Apnea     Neck size; 18in       Subjective     History of Present Illness  Jose A Villanueva is a 73 y.o. male who presents today for new patient evaluation to establish care for PIPPA.  Patient reports that he was diagnosed with sleep apnea over 10 years ago and currently on PAP therapy.  He recently moved here from [place] and is here today to establish care.  He reports that he is doing well on PAP therapy with good compliance.  He is using a nasal mask and does well with that.  Airflow is comfortable.  He denies excessive daytime sleepiness or fatigue.  He is needing an order for new PAP supplies.  Download reviewed and interpreted.  Compliance is 90%, AHI is 3.7  He also reports he had a cardiac evaluation last year in [place] that revealed mild aortic stenosis and coronary artery disease.  Left heart cath on 2023 revealed a chronic total occlusion to the LAD extending from the ostium to the distal vessel and fed by collaterals from the RCA.  Mild nonobstructive CAD involving the proximal circumflex and proximal to mid RCA.  Patient denies any symptoms today.  Denies chest pain, shortness of breath, palpitations, dizziness, lower extremity edema or syncope.  Overall, patient is doing well today with no specific concerns or complaints.  Lipid panel from 2023 reviewed, total cholesterol 142, triglycerides 144, HDL 31 and LDL 85.      Cardiac/sleep history  1.  PIPPA with unknown baseline AHI  2.  CAD  3.  Hypertension  4.  Mild aortic stenosis    [facility] 2023-  ú  Chronic total occlusion to the LAD extending from the ostium to the   distal vessel and fed by collaterals from the RCA   ú  Mild nonobstructive coronary artery disease involving the proximal LCx   and proximal to mid RCA.   ú  Mildly elevated  LVEDP at 24 mmHg    Echocardiogram 10/25/2022-  LVEF 55%.  Abnormal grade 1 (mild/impaired relaxation) left ventricular diastolic dysfunction.  Mild concentric LVH.  RVSP is normal.  Mild aortic stenosis.  Peak gradient 33 mmHg.  Mean gradient 18 mmHg     Reports Denies   Chest Pain [] [x]   Shortness of Air [] [x]   Palpitations [] [x]   Edema [] [x]   Dizziness [] [x]   Syncope [] [x]       Allergies   Allergen Reactions    Cerumenex [Trolamine (Triethanolamine)] Rash       Current Outpatient Medications:     amLODIPine (NORVASC) 10 MG tablet, Take 1 tablet by mouth Daily., Disp: 90 tablet, Rfl: 1    aspirin 81 MG oral suspension, , Disp: , Rfl:     atorvastatin (Lipitor) 40 MG tablet, Take 1 tablet by mouth Daily., Disp: 90 tablet, Rfl: 1    Baclofen (LIORESAL) 5 MG tablet, TAKE 1 TABLET BY MOUTH EVERY 8 HOURS, Disp: 90 tablet, Rfl: 0    Blood Glucose Monitoring Suppl (D-Care Glucometer) w/Device kit, 1 kit 3 (Three) Times a Day. Indications: may substitute for any glucometer kit that is approved and availabe, Disp: 1 kit, Rfl: 0    fenofibrate 160 MG tablet, Take 1 tablet by mouth Daily., Disp: , Rfl:     glipizide (Glucotrol) 10 MG tablet, Take 1 tablet by mouth 2 (Two) Times a Day Before Meals., Disp: 180 tablet, Rfl: 1    hydroCHLOROthiazide (HYDRODIURIL) 25 MG tablet, Take 1 tablet by mouth Daily., Disp: 90 tablet, Rfl: 2    Insulin Glargine (Lantus SoloStar) 100 UNIT/ML injection pen, 60-80 units once daily E11.9, Disp: 15 mL, Rfl: 2    linagliptin (TRADJENTA) 5 MG tablet tablet, Take 1 tablet by mouth Daily., Disp: 90 tablet, Rfl: 1    LORazepam (Ativan) 1 MG tablet, Take 1 tablet by mouth Every 8 (Eight) Hours As Needed for Anxiety., Disp: 60 tablet, Rfl: 0    metoprolol tartrate (LOPRESSOR) 100 MG tablet, Take 1 tablet by mouth Daily. (Patient taking differently: Take 1 tablet by mouth 2 (Two) Times a Day.), Disp: 30 tablet, Rfl: 2    nystatin (MYCOSTATIN) 581423 UNIT/GM powder, Apply  topically to the  appropriate area as directed 3 (Three) Times a Day., Disp: 30 g, Rfl: 0    pantoprazole (PROTONIX) 40 MG EC tablet, Take 1 tablet by mouth Daily., Disp: , Rfl:     pregabalin (LYRICA) 150 MG capsule, Take 1 capsule by mouth Every 12 (Twelve) Hours., Disp: , Rfl:     sertraline (ZOLOFT) 50 MG tablet, Take 1 tablet by mouth Daily., Disp: 90 tablet, Rfl: 0    Past Medical History:   Diagnosis Date    Alcoholism     in remission    Anxiety     Arthritis     Asthma     sleep apnea/APAP    Cervical disc disorder     Coronary artery disease involving native coronary artery of native heart without angina pectoris 2023    Depression     Diabetes mellitus     GERD (gastroesophageal reflux disease)     Hyperlipidemia     Hypertension     Low back pain     casual/ occasional    Peripheral neuropathy     Substance abuse     alcoholism    Thoracic disc disorder       Past Surgical History:   Procedure Laterality Date    CHOLECYSTECTOMY      EPIDURAL BLOCK      by pain doctor    KNEE ARTHROSCOPY Bilateral     REPLACEMENT TOTAL HIP ONCOLOGIC Right     TONSILLECTOMY       Family History   Problem Relation Age of Onset    Arthritis Mother     Hypertension Mother     COPD Mother             Arthritis Father     Hypertension Father     Hyperlipidemia Father             Alcohol abuse Brother     Drug abuse Brother             Early death Brother         49 yo     Social History     Socioeconomic History    Marital status: Single   Tobacco Use    Smoking status: Former     Packs/day: 1.00     Years: 15.00     Pack years: 15.00     Types: Cigarettes     Start date: 1973     Quit date: 1999     Years since quittin.6    Smokeless tobacco: Never   Vaping Use    Vaping Use: Never used   Substance and Sexual Activity    Alcohol use: Not Currently    Drug use: Yes    Sexual activity: Not Currently     Partners: Male     Birth control/protection: Condom, Same-sex partner  "      Objective     Vital Signs:  /64   Pulse 67   Ht 177.8 cm (70\")   Wt 125 kg (276 lb)   SpO2 95%   BMI 39.60 kg/mý   Estimated body mass index is 39.6 kg/mý as calculated from the following:    Height as of this encounter: 177.8 cm (70\").    Weight as of this encounter: 125 kg (276 lb).               Physical Exam  Constitutional:       Appearance: Normal appearance. He is well-developed.   HENT:      Head: Normocephalic and atraumatic.   Eyes:      Pupils: Pupils are equal, round, and reactive to light.   Neck:      Vascular: No carotid bruit.   Cardiovascular:      Rate and Rhythm: Normal rate and regular rhythm.      Pulses: Normal pulses.      Heart sounds: Murmur heard.   Pulmonary:      Breath sounds: Normal breath sounds. No wheezing or rhonchi.   Musculoskeletal:      Right lower leg: No edema.      Left lower leg: No edema.   Skin:     Capillary Refill: Capillary refill takes less than 2 seconds.      Coloration: Skin is not cyanotic.      Nails: There is no clubbing.   Neurological:      Mental Status: He is alert and oriented to person, place, and time.      Motor: No weakness.      Gait: Gait normal.   Psychiatric:         Mood and Affect: Mood normal.         Behavior: Behavior is cooperative.         Thought Content: Thought content normal.         Cognition and Memory: Memory normal.         Cardiology studies reviewed: Left heart cath and echocardiogram from outside facility reviewed          Assessment and Plan     Diagnoses and all orders for this visit:    1. Obstructive sleep apnea (Primary)  Assessment & Plan:  Patient was diagnosed with sleep apnea over 10 years ago and currently on PAP therapy.  He reports that he is doing well on PAP therapy with good compliance.  He is using a nasal mask and does well with that.  Airflow is comfortable.  He denies excessive daytime sleepiness or fatigue.  He is needing an order for new PAP supplies.  He did not bring device chip in today so no " download available at this time.  - Patient will send us serial number of his device so we can obtain download for review.  - Follow-up in 6 months.  - Order for supplies sent to [employer].    Orders:  -     PAP Therapy    2. Coronary artery disease involving native coronary artery of native heart without angina pectoris  Assessment & Plan:  Coronary artery disease is  stable .  Continue current treatment regimen.  Dietary sodium restriction.  Regular aerobic exercise.  Cardiac status will be reassessed in 6 months.    LHC from 2/11/2023 revealed chronic total occlusion to the LAD extending from the ostium to the distal vessel and fed by collaterals from the RCA.  Mild nonobstructive CAD involving the proximal circumflex and proximal to mid RCA.   Patient denies chest pain, worsening shortness of breath or fatigue.   Lipid panel from 8/17/2023 reviewed, total cholesterol 142, triglycerides 144, HDL 31 and LDL 85.    -Continue aspirin, atorvastatin, metoprolol and amlodipine at current doses.      3. Aortic stenosis, mild  Assessment & Plan:  Echocardiogram from 10/25/2023 shows mild aortic stenosis.  - We will consider a repeat echo at next office visit in 6 months.      4. Primary hypertension  Assessment & Plan:  Hypertension is  stable .  Continue current treatment regimen.  Blood pressure will be reassessed at the next regular appointment.          Recommendations: Report if any new/changing symptoms immediately          Follow Up  Return in about 6 months (around 2/29/2024) for PIPPA, aortic stenosis.  Patient was given instructions and counseling regarding his condition or for health maintenance advice. Please see specific information pulled into the AVS if appropriate.

## 2023-08-29 NOTE — ASSESSMENT & PLAN NOTE
Echocardiogram from 10/25/2023 shows mild aortic stenosis.  - We will consider a repeat echo at next office visit in 6 months.

## 2023-08-29 NOTE — ASSESSMENT & PLAN NOTE
Patient was diagnosed with sleep apnea over 10 years ago and currently on PAP therapy.  He reports that he is doing well on PAP therapy with good compliance.  He is using a nasal mask and does well with that.  Airflow is comfortable.  He denies excessive daytime sleepiness or fatigue.  He is needing an order for new PAP supplies.  Download reviewed and interpreted, compliance is 90%, AHI is 3.7  - Follow-up in 6 months.  - Order for supplies sent to [employer].

## 2023-08-29 NOTE — ASSESSMENT & PLAN NOTE
Coronary artery disease is  stable .  Continue current treatment regimen.  Dietary sodium restriction.  Regular aerobic exercise.  Cardiac status will be reassessed in 6 months.    LHC from 2/11/2023 revealed chronic total occlusion to the LAD extending from the ostium to the distal vessel and fed by collaterals from the RCA.  Mild nonobstructive CAD involving the proximal circumflex and proximal to mid RCA.   Patient denies chest pain, worsening shortness of breath or fatigue.   Lipid panel from 8/17/2023 reviewed, total cholesterol 142, triglycerides 144, HDL 31 and LDL 85.    -Continue aspirin, atorvastatin, metoprolol and amlodipine at current doses.

## 2023-08-29 NOTE — ASSESSMENT & PLAN NOTE
Hypertension is  stable .  Continue current treatment regimen.  Blood pressure will be reassessed at the next regular appointment.

## 2023-09-06 ENCOUNTER — TELEPHONE (OUTPATIENT)
Dept: FAMILY MEDICINE CLINIC | Facility: CLINIC | Age: 73
End: 2023-09-06
Payer: MEDICARE

## 2023-09-06 NOTE — TELEPHONE ENCOUNTER
----- Message from BRENDA Phelan sent at 9/6/2023  2:13 PM EDT -----  Please let Jose A know his cologuard is negative

## 2023-09-11 RX ORDER — INSULIN GLARGINE 100 [IU]/ML
INJECTION, SOLUTION SUBCUTANEOUS
Qty: 45 ML | Refills: 3 | Status: SHIPPED | OUTPATIENT
Start: 2023-09-11

## 2023-09-18 DIAGNOSIS — F41.9 ANXIETY: ICD-10-CM

## 2023-09-18 RX ORDER — LORAZEPAM 1 MG/1
1 TABLET ORAL EVERY 8 HOURS PRN
Qty: 60 TABLET | Refills: 0 | Status: SHIPPED | OUTPATIENT
Start: 2023-09-18

## 2023-09-18 NOTE — TELEPHONE ENCOUNTER
Caller: Jose A Villanueva    Relationship: Self    Best call back number: [phone]     Requested Prescriptions:   Requested Prescriptions     Pending Prescriptions Disp Refills    LORazepam (Ativan) 1 MG tablet 60 tablet 0     Sig: Take 1 tablet by mouth Every 8 (Eight) Hours As Needed for Anxiety.        Pharmacy where request should be sent: [facility] - [facility] [address] - [phone]  - [phone] FX     Last office visit with prescribing clinician: 8/17/2023   Last telemedicine visit with prescribing clinician: Visit date not found   Next office visit with prescribing clinician: 11/21/2023     Additional details provided by patient:     Does the patient have less than a 3 day supply:  [] Yes  [x] No    Would you like a call back once the refill request has been completed: [] Yes [x] No    If the office needs to give you a call back, can they leave a voicemail: [] Yes [x] No    Veronica De La O   09/18/23 14:29 EDT

## 2023-09-25 ENCOUNTER — TELEPHONE (OUTPATIENT)
Dept: NEUROSURGERY | Facility: CLINIC | Age: 73
End: 2023-09-25
Payer: MEDICARE

## 2023-09-25 NOTE — TELEPHONE ENCOUNTER
Caller: Jose A Villanueva    Relationship to patient: Self    Best call back number: [phone]    Patient is needing: PATIENT CALLED, REQUESTING WE FAX NEW REFERRAL FOR HIM TO COMPLETE HIS INJECTION. PATIENT STATES HE MISSED HIS PREV INJECTIONS AND NEEDS THE NEW REFERRAL. PLEASE REVIEW, PLEASE CALL THE PATIENT TO ADVISE.    THANK YOU

## 2023-09-26 DIAGNOSIS — M54.16 LUMBAR RADICULOPATHY: Primary | ICD-10-CM

## 2023-10-11 ENCOUNTER — TELEPHONE (OUTPATIENT)
Dept: FAMILY MEDICINE CLINIC | Facility: CLINIC | Age: 73
End: 2023-10-11
Payer: MEDICARE

## 2023-10-11 DIAGNOSIS — F41.9 ANXIETY: ICD-10-CM

## 2023-10-11 RX ORDER — LORAZEPAM 1 MG/1
1 TABLET ORAL EVERY 8 HOURS PRN
Qty: 60 TABLET | Refills: 0 | Status: SHIPPED | OUTPATIENT
Start: 2023-10-11

## 2023-10-11 NOTE — TELEPHONE ENCOUNTER
Caller: Jose A Villanueva    Relationship: Self    Best call back number: [phone]     Requested Prescriptions:   Requested Prescriptions     Pending Prescriptions Disp Refills    LORazepam (Ativan) 1 MG tablet 60 tablet 0     Sig: Take 1 tablet by mouth Every 8 (Eight) Hours As Needed for Anxiety.        Pharmacy where request should be sent: [facility] - [facility] [address] - [phone]  - [phone] FX     Last office visit with prescribing clinician: 8/17/2023   Last telemedicine visit with prescribing clinician: Visit date not found   Next office visit with prescribing clinician: 11/21/2023     Additional details provided by patient: PATIENT HAS 2 DAYS REMAINING     Does the patient have less than a 3 day supply:  [x] Yes  [] No    Would you like a call back once the refill request has been completed: [x] Yes [] No    If the office needs to give you a call back, can they leave a voicemail: [x] Yes [] No    Wendy Eduardo Rep   10/11/23 13:24 EDT

## 2023-10-12 NOTE — TELEPHONE ENCOUNTER
Called and left a message advising patient to return my call to let me know where he wants to go to a hand specialist at

## 2023-10-17 NOTE — TELEPHONE ENCOUNTER
10/17/23 1358   Post-Acute Status   Post-Acute Authorization Hospice;Home Health   Home Health Status Referrals Sent   Hospice Status Referrals Sent   Coverage MEDICARE - MEDICARE PART A & B   Discharge Plan   Discharge Plan A Hospice/home   Discharge Plan B Home Health     SW met with patient to review discharge recommendation of hospice/home and is agreeable to plan    Patient/family provided list of facilities in-network with patient's payor plan. Providers that are owned, operated, or affiliated with [facility] are included on the list.     Notified that referral sent to below listed facilities from in-network list based on proximity to home/family support:   [facility]    Patient/family instructed to identify preference.    Preferred Facility: (if more than 1, listed in order of descending preference)  [facility]    If an additional preferred facility not listed above is identified, additional referral to be sent. If above facilities unable to accept, will send additional referrals to in-network providers.        SW will continue to follow.                KURTIS Parham, LMSW  [facility]  Case Management  Ext. [phone]    Called and spoke with patient ad advised him of lab results and that medicines have been called into the pharmacy. He verbalized understanding

## 2023-11-07 ENCOUNTER — TELEPHONE (OUTPATIENT)
Dept: FAMILY MEDICINE CLINIC | Facility: CLINIC | Age: 73
End: 2023-11-07
Payer: MEDICARE

## 2023-11-07 DIAGNOSIS — F41.9 ANXIETY: ICD-10-CM

## 2023-11-07 RX ORDER — LORAZEPAM 1 MG/1
1 TABLET ORAL EVERY 8 HOURS PRN
Qty: 60 TABLET | Refills: 0 | Status: SHIPPED | OUTPATIENT
Start: 2023-11-07

## 2023-11-07 NOTE — TELEPHONE ENCOUNTER
Caller: Jose A Villanueva    Relationship: Self    Best call back number: [phone]     Which medication are you concerned about: PAYAL     Who prescribed you this medication: MICKEY MATIAS     When did you start taking this medication: 3 MONTHS     What are your concerns: COST $400 A MONTH AND CAN'T AFFORD IT. CAN WE LOOK INTO SOMETHING ELSE THAT INSURANCE WOULD COVER?     How long have you had these concerns: SINCE STARTING IT

## 2023-11-07 NOTE — TELEPHONE ENCOUNTER
Caller: Jose A Villanueva    Relationship: Self    Best call back number: [phone]     Requested Prescriptions:   Requested Prescriptions     Pending Prescriptions Disp Refills    LORazepam (Ativan) 1 MG tablet 60 tablet 0     Sig: Take 1 tablet by mouth Every 8 (Eight) Hours As Needed for Anxiety.        Pharmacy where request should be sent: [facility] - [facility] [address] - [phone]  - [phone] FX     Last office visit with prescribing clinician: 8/17/2023   Last telemedicine visit with prescribing clinician: Visit date not found   Next office visit with prescribing clinician: 11/21/2023     Additional details provided by patient: PLEASE REFILL     Does the patient have less than a 3 day supply:  [] Yes  [x] No    Would you like a call back once the refill request has been completed: [] Yes [x] No    If the office needs to give you a call back, can they leave a voicemail: [] Yes [x] No    Wendy Morales Rep   11/07/23 11:57 EST

## 2023-11-21 ENCOUNTER — OFFICE VISIT (OUTPATIENT)
Dept: FAMILY MEDICINE CLINIC | Facility: CLINIC | Age: 73
End: 2023-11-21
Payer: MEDICARE

## 2023-11-21 VITALS
OXYGEN SATURATION: 95 % | DIASTOLIC BLOOD PRESSURE: 68 MMHG | HEIGHT: 70 IN | RESPIRATION RATE: 18 BRPM | TEMPERATURE: 97.3 F | SYSTOLIC BLOOD PRESSURE: 112 MMHG | WEIGHT: 283 LBS | HEART RATE: 78 BPM | BODY MASS INDEX: 40.52 KG/M2

## 2023-11-21 DIAGNOSIS — Z79.4 CONTROLLED TYPE 2 DIABETES MELLITUS WITH HYPERGLYCEMIA, WITH LONG-TERM CURRENT USE OF INSULIN: Primary | ICD-10-CM

## 2023-11-21 DIAGNOSIS — I25.10 CORONARY ARTERY DISEASE INVOLVING NATIVE CORONARY ARTERY OF NATIVE HEART WITHOUT ANGINA PECTORIS: ICD-10-CM

## 2023-11-21 DIAGNOSIS — E11.65 CONTROLLED TYPE 2 DIABETES MELLITUS WITH HYPERGLYCEMIA, WITH LONG-TERM CURRENT USE OF INSULIN: Primary | ICD-10-CM

## 2023-11-21 DIAGNOSIS — G47.33 OBSTRUCTIVE SLEEP APNEA: ICD-10-CM

## 2023-11-21 DIAGNOSIS — K59.09 OTHER CONSTIPATION: ICD-10-CM

## 2023-11-21 DIAGNOSIS — M54.16 LUMBAR RADICULOPATHY: ICD-10-CM

## 2023-11-21 DIAGNOSIS — F51.01 PRIMARY INSOMNIA: ICD-10-CM

## 2023-11-21 DIAGNOSIS — N18.31 STAGE 3A CHRONIC KIDNEY DISEASE: ICD-10-CM

## 2023-11-21 DIAGNOSIS — E78.2 MIXED HYPERLIPIDEMIA: ICD-10-CM

## 2023-11-21 DIAGNOSIS — I10 PRIMARY HYPERTENSION: ICD-10-CM

## 2023-11-21 DIAGNOSIS — M48.062 SPINAL STENOSIS, LUMBAR REGION, WITH NEUROGENIC CLAUDICATION: ICD-10-CM

## 2023-11-21 LAB
EXPIRATION DATE: ABNORMAL
HBA1C MFR BLD: 8.1 % (ref 4.5–5.7)
Lab: ABNORMAL

## 2023-11-21 RX ORDER — TIZANIDINE 4 MG/1
4 TABLET ORAL NIGHTLY PRN
COMMUNITY

## 2023-11-21 NOTE — PROGRESS NOTES
Office Note     Name: Jose A Villanueva    : 1950     MRN: [id number]     Chief Complaint  Follow-up    Subjective     History of Present Illness:  Jose A Villanueva is a 73 y.o. male who presents today for follow-up on chronic conditions including stage IIIa chronic kidney disease, spinal stenosis with neurogenic claudication, insomnia, hypertension, constipation, sleep apnea, hyperlipidemia, coronary artery disease and type 2 diabetes.  Patient has been evaluated by neurosurgical services at [facility] and was subsequently referred to pain management.  They would like to avoid surgery due to his weight and uncontrolled A1c.  He has received injections from pain management which he reports have provided significant benefit.  In regards to his longstanding pattern of insomnia he has utilized lorazepam nightly with good benefit for a number of years.  In the past he has had poor response to medications such as trazodone, Ambien and Lunesta.  Blood pressure is well-controlled on current medications, 112/68 in office today.  He continues to report good compliance with nightly PAP usage.  Patient continues to check his fasting blood sugar every morning.  He notes most readings below 100 but peak reading to be 138.  His A1c is gradually improving, down to 8.1% in office today.  Currently utilizing Lantus and glipizide.  He was previously on Tradjenta but due to cost has had to stop.  He has some issues with mild constipation, taking MiraLAX as needed.  He has no further complaints or concerns today    Review of Systems   Constitutional:  Negative for fatigue.   Eyes:  Negative for blurred vision.   Respiratory:  Negative for shortness of breath.    Cardiovascular:  Negative for chest pain and palpitations.   Gastrointestinal:  Positive for constipation. Negative for abdominal pain, diarrhea, nausea and vomiting.   Endocrine: Negative for polydipsia and polyuria.   Genitourinary:  Negative for difficulty urinating, flank pain  "and hematuria.   Musculoskeletal:  Positive for arthralgias and back pain.   Neurological:  Positive for weakness and numbness. Negative for dizziness, light-headedness and headache.   Psychiatric/Behavioral:  Negative for depressed mood. The patient is not nervous/anxious.        Objective     Past Medical History:   Diagnosis Date    Alcoholism     in remission    Anxiety     Arthritis     Asthma 2015    sleep apnea/APAP    Cervical disc disorder     Coronary artery disease involving native coronary artery of native heart without angina pectoris 2023    Depression     Diabetes mellitus     GERD (gastroesophageal reflux disease)     Hyperlipidemia     Hypertension     Low back pain     casual/ occasional    Peripheral neuropathy     Substance abuse     alcoholism    Thoracic disc disorder      Past Surgical History:   Procedure Laterality Date    CHOLECYSTECTOMY      EPIDURAL BLOCK      by pain doctor    KNEE ARTHROSCOPY Bilateral     REPLACEMENT TOTAL HIP ONCOLOGIC Right     TONSILLECTOMY       Family History   Problem Relation Age of Onset    Arthritis Mother     Hypertension Mother     COPD Mother             Arthritis Father     Hypertension Father     Hyperlipidemia Father             Alcohol abuse Brother     Drug abuse Brother             Early death Brother         49 yo       Vital Signs  /68 (BP Location: Left arm, Patient Position: Sitting, Cuff Size: Adult)   Pulse 78   Temp 97.3 °F (36.3 °C) (Temporal)   Resp 18   Ht 177.8 cm (70\")   Wt 128 kg (283 lb)   SpO2 95%   BMI 40.61 kg/m²   Estimated body mass index is 40.61 kg/m² as calculated from the following:    Height as of this encounter: 177.8 cm (70\").    Weight as of this encounter: 128 kg (283 lb).    Physical Exam  Vitals reviewed.   Constitutional:       Appearance: Normal appearance.   HENT:      Head: Normocephalic and atraumatic.      Right Ear: Tympanic membrane, ear canal and " external ear normal.      Left Ear: Tympanic membrane, ear canal and external ear normal.      Nose: Nose normal.      Mouth/Throat:      Mouth: Mucous membranes are moist.      Pharynx: Oropharynx is clear.   Eyes:      Conjunctiva/sclera: Conjunctivae normal.      Pupils: Pupils are equal, round, and reactive to light.   Cardiovascular:      Rate and Rhythm: Normal rate and regular rhythm.   Pulmonary:      Effort: Pulmonary effort is normal.      Breath sounds: Normal breath sounds.   Abdominal:      General: Bowel sounds are normal.      Palpations: Abdomen is soft.   Musculoskeletal:      Cervical back: Neck supple.   Skin:     General: Skin is warm and dry.      Capillary Refill: Capillary refill takes less than 2 seconds.   Neurological:      Mental Status: He is alert and oriented to person, place, and time.   Psychiatric:         Mood and Affect: Mood normal.         Behavior: Behavior normal.         Thought Content: Thought content normal.         Judgment: Judgment normal.             POCT Results (if applicable):  Results for orders placed or performed in visit on 11/21/23   POC Glycosylated Hemoglobin (Hb A1C)    Specimen: Blood   Result Value Ref Range    Hemoglobin A1C 8.1 (A) 4.5 - 5.7 %    Lot Number [id number]     Expiration Date 7,022,025             Assessment and Plan     Diagnoses and all orders for this visit:    1. Controlled type 2 diabetes mellitus with hyperglycemia, with long-term current use of insulin (Primary)  Assessment & Plan:  Patient continues to check his fasting blood sugar every morning.  He notes most readings below 100 but peak reading to be 138.  His A1c is gradually improving, down to 8.1% in office today.  Currently utilizing Lantus and glipizide.  He was previously on Tradjenta but due to cost has had to stop.   -Continue glipizide 10 mg twice daily  -Continue Lantus 60 to 100 units under the skin 1 times daily as directed    Orders:  -     POC Glycosylated Hemoglobin  (Hb A1C)    2. Stage 3a chronic kidney disease  Assessment & Plan:  Now seeing nephrology associatess with GFR 55 on last check.  As a result of decreased kidney function he has been taken off metformin by his nephrologist in [place] before relocating.  At that time his A1c has been suboptimally controlled.  Today we discussed need for tight glycemic and blood pressure control to preserve renal function.  I discussed avoidance of nephrotoxic agents including NSAIDs.  He tells me there is no way that he can completely give up NSAIDs due to chronic back pain issues.      3. Spinal stenosis, lumbar region, with neurogenic claudication  Assessment & Plan:  Patient has been evaluated by neurosurgery and now referred to pain management. Patient reports they would like to avoid surgery due to his weight and uncontrolled A1c. Per neurosurgery, based off of his MRI he does have some advanced level degenerative changes in the L3-4 facets as well as the interspinous bursa looks like it probably has a cyst in it at the L3-4 area. This is likely the cause of a lot of his low back pain when he is laying down at night. Patient does have pretty predictable pain whenever he is walking that radiates down the lateral aspect of his thigh. Patient also has diabetic peripheral neuropathy in the left foot and has chronic burning in his left foot he notes excellent benefit from epidural injections that he received thus far.      4. Primary hypertension  Assessment & Plan:  BP well controlled on current meds, 112/68.   Continue current medications HCTZ 25 mg once daily and metoprolol 100mg       5. Primary insomnia  Assessment & Plan:  Longstanding pattern of insomnia, he is utilizing lorazepam nightly with good benefit for a number of years.  Poor response in the past to medication such as trazodone, Ambien and Lunesta.  Reviewed Jack with satisfactory findings.  He has controlled substance agreement and up-to-date UDS on file  -Continue  lorazepam 0.5mg       6. Obstructive sleep apnea  Assessment & Plan:  Patient reports excellent compliance with nightly PAP use, followed by Dr. Naqvi      7. Lumbar radiculopathy  Assessment & Plan:  tashia was followed and approved for neurosurgery prior to leaving [place].  MRI reviewed in office today showing L3-5 circumferential disc bulging, ligamentum flavum infolding and facet arthropathy contributing to left subarticular zone impingement of the traversing L5 nerve root, mild spinal canal stenosis and mild bilateral neural foraminal stenosis.  Was evaluated by neurosurgery at [facility] who declined surgery at this time, receiving benefit from pain management      8. Mixed hyperlipidemia  Assessment & Plan:  Continue atorvastatin 40 mg daily as well as fenofibrate 160 mg daily as ordered      9. Coronary artery disease involving native coronary artery of native heart without angina pectoris  Assessment & Plan:  LHC from 2/11/2023 revealed chronic total occlusion to the LAD extending from the ostium to the distal vessel and fed by collaterals from the RCA.  Mild nonobstructive CAD involving the proximal circumflex and proximal to mid RCA.   Patient denies chest pain, worsening shortness of breath or fatigue.   Lipid panel from 8/17/2023 reviewed, total cholesterol 142, triglycerides 144, HDL 31 and LDL 85.     -Continue aspirin, atorvastatin, metoprolol and amlodipine at current doses.      10. Other constipation  Assessment & Plan:  Responds well to miralax                     Follow Up  Return in about 3 months (around 2/21/2024) for Next scheduled follow up.    Radha Asher, APRN

## 2023-11-21 NOTE — ASSESSMENT & PLAN NOTE
Patient continues to check his fasting blood sugar every morning.  He notes most readings below 100 but peak reading to be 138.  His A1c is gradually improving, down to 8.1% in office today.  Currently utilizing Lantus and glipizide.  He was previously on Tradjenta but due to cost has had to stop.   -Continue glipizide 10 mg twice daily  -Continue Lantus 60 to 100 units under the skin 1 times daily as directed

## 2023-11-22 NOTE — ASSESSMENT & PLAN NOTE
Now seeing nephrology associatess with GFR 55 on last check.  As a result of decreased kidney function he has been taken off metformin by his nephrologist in [place] before relocating.  At that time his A1c has been suboptimally controlled.  Today we discussed need for tight glycemic and blood pressure control to preserve renal function.  I discussed avoidance of nephrotoxic agents including NSAIDs.  He tells me there is no way that he can completely give up NSAIDs due to chronic back pain issues.

## 2023-11-22 NOTE — ASSESSMENT & PLAN NOTE
LHC from 2/11/2023 revealed chronic total occlusion to the LAD extending from the ostium to the distal vessel and fed by collaterals from the RCA.  Mild nonobstructive CAD involving the proximal circumflex and proximal to mid RCA.   Patient denies chest pain, worsening shortness of breath or fatigue.   Lipid panel from 8/17/2023 reviewed, total cholesterol 142, triglycerides 144, HDL 31 and LDL 85.     -Continue aspirin, atorvastatin, metoprolol and amlodipine at current doses.

## 2023-11-22 NOTE — ASSESSMENT & PLAN NOTE
BP well controlled on current meds, 112/68.   Continue current medications HCTZ 25 mg once daily and metoprolol 100mg

## 2023-11-22 NOTE — ASSESSMENT & PLAN NOTE
[facility] prior to leaving [place].  MRI reviewed in office today showing L3-5 circumferential disc bulging, ligamentum flavum infolding and facet arthropathy contributing to left subarticular zone impingement of the traversing L5 nerve root, mild spinal canal stenosis and mild bilateral neural foraminal stenosis.  Was evaluated by neurosurgery at [facility] who declined surgery at this time, receiving benefit from pain management

## 2023-11-22 NOTE — ASSESSMENT & PLAN NOTE
Patient has been evaluated by neurosurgery and now referred to pain management. Patient reports they would like to avoid surgery due to his weight and uncontrolled A1c. Per neurosurgery, based off of his MRI he does have some advanced level degenerative changes in the L3-4 facets as well as the interspinous bursa looks like it probably has a cyst in it at the L3-4 area. This is likely the cause of a lot of his low back pain when he is laying down at night. Patient does have pretty predictable pain whenever he is walking that radiates down the lateral aspect of his thigh. Patient also has diabetic peripheral neuropathy in the left foot and has chronic burning in his left foot he notes excellent benefit from epidural injections that he received thus far.

## 2023-12-01 ENCOUNTER — HOSPITAL ENCOUNTER (EMERGENCY)
Age: 73
Discharge: HOME | End: 2023-12-01
Payer: MEDICARE

## 2023-12-01 VITALS
DIASTOLIC BLOOD PRESSURE: 84 MMHG | SYSTOLIC BLOOD PRESSURE: 147 MMHG | OXYGEN SATURATION: 92 % | HEART RATE: 89 BPM | TEMPERATURE: 100.22 F | RESPIRATION RATE: 18 BRPM

## 2023-12-01 VITALS
DIASTOLIC BLOOD PRESSURE: 84 MMHG | RESPIRATION RATE: 18 BRPM | OXYGEN SATURATION: 94 % | SYSTOLIC BLOOD PRESSURE: 147 MMHG | HEART RATE: 89 BPM | TEMPERATURE: 99.32 F

## 2023-12-01 VITALS — BODY MASS INDEX: 40.1 KG/M2

## 2023-12-01 DIAGNOSIS — H66.93: ICD-10-CM

## 2023-12-01 DIAGNOSIS — Z87.891: ICD-10-CM

## 2023-12-01 DIAGNOSIS — R09.89: ICD-10-CM

## 2023-12-01 DIAGNOSIS — J20.9: Primary | ICD-10-CM

## 2023-12-01 DIAGNOSIS — R50.9: ICD-10-CM

## 2023-12-01 DIAGNOSIS — R09.81: ICD-10-CM

## 2023-12-01 DIAGNOSIS — R05.9: ICD-10-CM

## 2023-12-01 PROCEDURE — 99204 OFFICE O/P NEW MOD 45 MIN: CPT

## 2023-12-01 PROCEDURE — 99212 OFFICE O/P EST SF 10 MIN: CPT

## 2023-12-01 PROCEDURE — G0463 HOSPITAL OUTPT CLINIC VISIT: HCPCS

## 2023-12-01 PROCEDURE — 87635 SARS-COV-2 COVID-19 AMP PRB: CPT

## 2023-12-01 PROCEDURE — 71046 X-RAY EXAM CHEST 2 VIEWS: CPT

## 2023-12-04 DIAGNOSIS — F41.9 ANXIETY: ICD-10-CM

## 2023-12-04 RX ORDER — LORAZEPAM 1 MG/1
1 TABLET ORAL EVERY 8 HOURS PRN
Qty: 60 TABLET | Refills: 0 | Status: SHIPPED | OUTPATIENT
Start: 2023-12-04

## 2023-12-04 NOTE — TELEPHONE ENCOUNTER
Caller: Jose A Villanueva    Relationship: Self    Best call back number: [phone]    Requested Prescriptions:   Requested Prescriptions     Pending Prescriptions Disp Refills    LORazepam (Ativan) 1 MG tablet 60 tablet 0     Sig: Take 1 tablet by mouth Every 8 (Eight) Hours As Needed for Anxiety.        Pharmacy where request should be sent: [facility] - [facility] [address] - [phone]  - [phone] FX     Last office visit with prescribing clinician: 11/21/2023   Last telemedicine visit with prescribing clinician: Visit date not found   Next office visit with prescribing clinician: 2/20/2024     Additional details provided by patient:     Does the patient have less than a 3 day supply:  [x] Yes  [] No    Would you like a call back once the refill request has been completed: [] Yes [x] No    If the office needs to give you a call back, can they leave a voicemail: [] Yes [x] No    Wendy Maciel Rep   12/04/23 15:02 EST

## 2023-12-13 DIAGNOSIS — F51.01 PRIMARY INSOMNIA: ICD-10-CM

## 2023-12-14 ENCOUNTER — TELEPHONE (OUTPATIENT)
Dept: FAMILY MEDICINE CLINIC | Facility: CLINIC | Age: 73
End: 2023-12-14
Payer: MEDICARE

## 2023-12-14 NOTE — TELEPHONE ENCOUNTER
Caller: Jose A Villanueva    Relationship: Self    Best call back number: [phone]     What was the call regarding:   PATIENT WOULD LIKE A CALL BACK TO SPEAK WITH DEANDRE GUTIERREZ ABOUT PHYSICAL THERAPY SINCE PATIENT DON'T FEEL LIKE THE PHYSICAL THERAPY IS HELPING AND WOULD LIKE TO STOP GOING

## 2023-12-14 NOTE — TELEPHONE ENCOUNTER
I have spoke with him and have let him know that he will need an appt for this. He has made it for next week. TF

## 2023-12-21 ENCOUNTER — OFFICE VISIT (OUTPATIENT)
Dept: FAMILY MEDICINE CLINIC | Facility: CLINIC | Age: 73
End: 2023-12-21
Payer: MEDICARE

## 2023-12-21 VITALS
HEIGHT: 70 IN | DIASTOLIC BLOOD PRESSURE: 70 MMHG | HEART RATE: 71 BPM | WEIGHT: 283 LBS | OXYGEN SATURATION: 95 % | TEMPERATURE: 97 F | RESPIRATION RATE: 18 BRPM | SYSTOLIC BLOOD PRESSURE: 108 MMHG | BODY MASS INDEX: 40.52 KG/M2

## 2023-12-21 DIAGNOSIS — M48.062 SPINAL STENOSIS, LUMBAR REGION, WITH NEUROGENIC CLAUDICATION: ICD-10-CM

## 2023-12-21 DIAGNOSIS — Z79.4 CONTROLLED TYPE 2 DIABETES MELLITUS WITH HYPERGLYCEMIA, WITH LONG-TERM CURRENT USE OF INSULIN: ICD-10-CM

## 2023-12-21 DIAGNOSIS — I10 PRIMARY HYPERTENSION: ICD-10-CM

## 2023-12-21 DIAGNOSIS — Z23 ENCOUNTER FOR IMMUNIZATION: Primary | ICD-10-CM

## 2023-12-21 DIAGNOSIS — H65.113 ACUTE MUCOID OTITIS MEDIA OF BOTH EARS: ICD-10-CM

## 2023-12-21 DIAGNOSIS — E11.65 CONTROLLED TYPE 2 DIABETES MELLITUS WITH HYPERGLYCEMIA, WITH LONG-TERM CURRENT USE OF INSULIN: ICD-10-CM

## 2023-12-21 RX ORDER — CEFDINIR 300 MG/1
300 CAPSULE ORAL 2 TIMES DAILY
Qty: 20 CAPSULE | Refills: 0 | Status: SHIPPED | OUTPATIENT
Start: 2023-12-21

## 2023-12-21 RX ORDER — BROMPHENIRAMINE MALEATE, PSEUDOEPHEDRINE HYDROCHLORIDE, AND DEXTROMETHORPHAN HYDROBROMIDE 2; 30; 10 MG/5ML; MG/5ML; MG/5ML
10 SYRUP ORAL 4 TIMES DAILY PRN
Qty: 200 ML | Refills: 0 | Status: SHIPPED | OUTPATIENT
Start: 2023-12-21

## 2023-12-21 NOTE — ASSESSMENT & PLAN NOTE
Currently, patient's glucose readings have been elevated, he is no longer able to afford his Tradjenta, he is currently in the Medicare donut hole so cost is almost $500 monthly.  As a result he has home glucose readings have gone up.  Relates pattern of generally below 200 in the morning, most mornings averaging around 150.  Discussed weekly injectable medications which she feels would be too expensive at this time.  He is interested in a GLP class medication for potential help with weight loss.  We have not Rybelsus samples available in office today.  Patient educated on mechanism of action as well as appropriate administration.  Patient will initiate Rybelsus 3 mg once daily for 30 days with intent to increase to 7 mg daily after the fourth 4 weeks.

## 2023-12-21 NOTE — PROGRESS NOTES
Office Note     Name: Jose A Villanueva    : 1950     MRN: [id number]     Chief Complaint  Follow-up    Subjective     History of Present Illness:  Jose A Villanueva is a 73 y.o. male who presents today for several concerns.  First issue to be addressed in regards to diagnosis of bronchitis he received 2 weeks ago at a [facility].  States after 1 week of cough, congestion and chest tightness he was evaluated at [facility] and treated for bronchitis with Augmentin, prednisone and Tessalon Perles.  Patient states that while he has had significant improvement he continues to experience chest tightness, wheezing and cough.  He felt the Tessalon Perles provided no benefit in regards to cough suppression.  He denies any respiratory distress.  Patient has been participating in physical therapy for chronic back pain stating he feels it is providing him improvement but wanted to confirm that I felt he needed to continue with his therapy, if I found it was beneficial.  Currently, patient's glucose readings have been elevated, he is no longer able to afford his Tradjenta, he is currently in the Medicare donut hole so cost is almost $500 monthly.  As a result he has home glucose readings have gone up.  Relates pattern of generally below 200 in the morning, most mornings averaging around 150.  Patient is also inquiring about his pneumonia and flu vaccines.  He is advised with decreased immunity at the current time due to bronchitis we should wait until those symptoms have resolved to pursue these vaccinations.  He verbalizes understanding.  He has no further complaints or concerns today  Review of Systems   HENT:  Positive for ear pain.    Respiratory:  Positive for cough, chest tightness and wheezing.    Musculoskeletal:  Positive for back pain.   Neurological:  Positive for weakness and numbness.       Objective     Past Medical History:   Diagnosis Date    Alcoholism     in remission    Anxiety     Arthritis     Asthma 2015    sleep  "apnea/APAP    Cervical disc disorder     Coronary artery disease involving native coronary artery of native heart without angina pectoris 2023    Depression     Diabetes mellitus     GERD (gastroesophageal reflux disease)     Hyperlipidemia     Hypertension     Low back pain     casual/ occasional    Peripheral neuropathy     Substance abuse     alcoholism    Thoracic disc disorder      Past Surgical History:   Procedure Laterality Date    CHOLECYSTECTOMY      EPIDURAL BLOCK      by pain doctor    KNEE ARTHROSCOPY Bilateral     REPLACEMENT TOTAL HIP ONCOLOGIC Right     TONSILLECTOMY       Family History   Problem Relation Age of Onset    Arthritis Mother     Hypertension Mother     COPD Mother             Arthritis Father     Hypertension Father     Hyperlipidemia Father             Alcohol abuse Brother     Drug abuse Brother             Early death Brother         49 yo       Vital Signs  /70 (BP Location: Left arm, Patient Position: Sitting, Cuff Size: Adult)   Pulse 71   Temp 97 °F (36.1 °C) (Temporal)   Resp 18   Ht 177.8 cm (70\")   Wt 128 kg (283 lb)   SpO2 95%   BMI 40.61 kg/m²   Estimated body mass index is 40.61 kg/m² as calculated from the following:    Height as of this encounter: 177.8 cm (70\").    Weight as of this encounter: 128 kg (283 lb).    Physical Exam  Vitals reviewed.   Constitutional:       Appearance: Normal appearance.   HENT:      Right Ear: Ear canal and external ear normal.      Left Ear: Ear canal and external ear normal.      Ears:      Comments: Both tympanic membranes cloudy and bulging.     Nose: Nose normal.      Mouth/Throat:      Pharynx: Oropharynx is clear. Posterior oropharyngeal erythema present.   Eyes:      Conjunctiva/sclera: Conjunctivae normal.   Cardiovascular:      Rate and Rhythm: Normal rate and regular rhythm.      Pulses: Normal pulses.      Heart sounds: Normal heart sounds.   Pulmonary:      Effort: " Pulmonary effort is normal. No respiratory distress.      Breath sounds: Wheezing present.   Abdominal:      General: Bowel sounds are normal.      Palpations: Abdomen is soft.   Musculoskeletal:      Cervical back: Neck supple.   Skin:     General: Skin is warm and dry.   Neurological:      Mental Status: He is alert and oriented to person, place, and time.          POCT Results (if applicable):  Results for orders placed or performed in visit on 11/21/23   POC Glycosylated Hemoglobin (Hb A1C)    Specimen: Blood   Result Value Ref Range    Hemoglobin A1C 8.1 (A) 4.5 - 5.7 %    Lot Number [id number]     Expiration Date 7,022,025             Assessment and Plan     Diagnoses and all orders for this visit:    1. Encounter for immunization (Primary)  -     Cancel: Fluzone High-Dose 65+yrs  -     Cancel: Pneumococcal Conjugate Vaccine 20-Valent All    2. Acute mucoid otitis media of both ears  Assessment & Plan:  Patient was treated for bronchitis at [facility] 2 weeks ago.  Treatment regimen included Augmentin, prednisone and Tessalon Perles.  He states that while he has had significant improvement in those symptoms he experiences chest tightness, wheezing and cough has continued.  He also now has bilateral ear pain.  Physical exam findings consistent with acute mucoid otitis media of both ears..  He is being given 10-day course of cefdinir to address his otitis media.  Will also give prescription for Bromfed for ongoing cough and congestion from bronchitis.    Orders:  -     brompheniramine-pseudoephedrine-DM 30-2-10 MG/5ML syrup; Take 10 mL by mouth 4 (Four) Times a Day As Needed for Cough or Congestion.  Dispense: 200 mL; Refill: 0  -     cefdinir (OMNICEF) 300 MG capsule; Take 1 capsule by mouth 2 (Two) Times a Day.  Dispense: 20 capsule; Refill: 0    3. Controlled type 2 diabetes mellitus with hyperglycemia, with long-term current use of insulin  Assessment & Plan:  Currently, patient's glucose readings have been  elevated, he is no longer able to afford his Tradjenta, he is currently in the Medicare donut hole so cost is almost $500 monthly.  As a result he has home glucose readings have gone up.  Relates pattern of generally below 200 in the morning, most mornings averaging around 150.  Discussed weekly injectable medications which she feels would be too expensive at this time.  He is interested in a GLP class medication for potential help with weight loss.  We have not Rybelsus samples available in office today.  Patient educated on mechanism of action as well as appropriate administration.  Patient will initiate Rybelsus 3 mg once daily for 30 days with intent to increase to 7 mg daily after the fourth 4 weeks.      4. Spinal stenosis, lumbar region, with neurogenic claudication  Assessment & Plan:  atient has been evaluated by neurosurgery and now referred to pain management. Patient reports they would like to avoid surgery due to his weight and uncontrolled A1c. Per neurosurgery, based off of his MRI he does have some advanced level degenerative changes in the L3-4 facets as well as the interspinous bursa looks like it probably has a cyst in it at the L3-4 area. This is likely the cause of a lot of his low back pain when he is laying down at night. Patient does have pretty predictable pain whenever he is walking that radiates down the lateral aspect of his thigh. Patient also has diabetic peripheral neuropathy in the left foot and has chronic burning in his left foot he notes excellent benefit from epidural injections that he received thus far.  Patient also feels that physical therapy is helping with his pain and mobility issues.  I encouraged him to continue current physical therapy evaluation and treatment at this time.      5. Primary hypertension  Assessment & Plan:  BP continues to be well-controlled on current medications, 108/70 in office today.  Continue HCTZ 25 mg once daily and metoprolol 100 mg twice  daily                 Vaccine Counseling:  “Discussed risks/benefits to vaccination, reviewed components of the vaccine, discussed VIS, discussed informed consent, informed consent obtained. Patient/Parent was allowed to accept or refuse vaccine. Questions answered to satisfactory state of patient/Parent. We reviewed typical age appropriate and seasonally appropriate vaccinations. Reviewed immunization history and updated state vaccination form as needed. Patient was counseled on Influenza  Prevnar 20      Follow Up  No follow-ups on file.    Radha Asher, APRN

## 2023-12-22 PROBLEM — H65.113 ACUTE MUCOID OTITIS MEDIA OF BOTH EARS: Status: ACTIVE | Noted: 2023-12-22

## 2023-12-22 PROBLEM — H65.193 ACUTE MUCOID OTITIS MEDIA OF BOTH EARS: Status: ACTIVE | Noted: 2023-12-22

## 2023-12-22 NOTE — ASSESSMENT & PLAN NOTE
BP continues to be well-controlled on current medications, 108/70 in office today.  Continue HCTZ 25 mg once daily and metoprolol 100 mg twice daily

## 2023-12-22 NOTE — ASSESSMENT & PLAN NOTE
tashia has been evaluated by neurosurgery and now referred to pain management. Patient reports they would like to avoid surgery due to his weight and uncontrolled A1c. Per neurosurgery, based off of his MRI he does have some advanced level degenerative changes in the L3-4 facets as well as the interspinous bursa looks like it probably has a cyst in it at the L3-4 area. This is likely the cause of a lot of his low back pain when he is laying down at night. Patient does have pretty predictable pain whenever he is walking that radiates down the lateral aspect of his thigh. Patient also has diabetic peripheral neuropathy in the left foot and has chronic burning in his left foot he notes excellent benefit from epidural injections that he received thus far.  Patient also feels that physical therapy is helping with his pain and mobility issues.  I encouraged him to continue current physical therapy evaluation and treatment at this time.

## 2023-12-22 NOTE — ASSESSMENT & PLAN NOTE
Patient was treated for bronchitis at [facility] 2 weeks ago.  Treatment regimen included Augmentin, prednisone and Tessalon Perles.  He states that while he has had significant improvement in those symptoms he experiences chest tightness, wheezing and cough has continued.  He also now has bilateral ear pain.  Physical exam findings consistent with acute mucoid otitis media of both ears..  He is being given 10-day course of cefdinir to address his otitis media.  Will also give prescription for Bromfed for ongoing cough and congestion from bronchitis.

## 2024-01-02 ENCOUNTER — TELEPHONE (OUTPATIENT)
Dept: FAMILY MEDICINE CLINIC | Facility: CLINIC | Age: 74
End: 2024-01-02
Payer: MEDICARE

## 2024-01-02 DIAGNOSIS — F41.9 ANXIETY: ICD-10-CM

## 2024-01-02 NOTE — TELEPHONE ENCOUNTER
Caller: Jose A Villanueva    Relationship: Self    Best call back number: [phone]     Requested Prescriptions:   Requested Prescriptions     Pending Prescriptions Disp Refills    LORazepam (Ativan) 1 MG tablet 60 tablet 0     Sig: Take 1 tablet by mouth Every 8 (Eight) Hours As Needed for Anxiety.        Pharmacy where request should be sent: [facility] - [facility] [address] - [phone]  - [phone]     Last office visit with prescribing clinician: 12/21/2023   Last telemedicine visit with prescribing clinician: Visit date not found   Next office visit with prescribing clinician: 2/20/2024     Does the patient have less than a 3 day supply:  [x] Yes  [] No    Would you like a call back once the refill request has been completed: [] Yes [x] No    If the office needs to give you a call back, can they leave a voicemail: [] Yes [x] No    Wendy Howard Rep   01/02/24 16:49 EST

## 2024-01-02 NOTE — TELEPHONE ENCOUNTER
Caller: Jose A Villanueva    Relationship: Self    Best call back number: [phone]     What is the medical concern/diagnosis: LOWER BACK T4 AND T5     What specialty or service is being requested:PHYSICAL THERAPY     What is the provider, practice or medical service name:   [facility]  [email or web address]  [address] [place] [address] [address] [place] [address]   PHONE: [phone]    Any additional details: PATIENT STATED THAT SINCE THE BEGINNING OF THE YEAR 2024 INSURANCE HAS INCREASED AND NOW PATIENT IS HAVING TO PAY A 35 DOLLAR CO-PAY FOR PHYSICAL THERAPY AND WOULD LIKE FOR A REFERRAL TO BE SENT TO [facility] [place] DUE TO THERE BEING NO CO-PAY WITH THEM FOR PHYSICAL THERAPY

## 2024-01-03 DIAGNOSIS — G89.29 CHRONIC BILATERAL LOW BACK PAIN WITH BILATERAL SCIATICA: Primary | ICD-10-CM

## 2024-01-03 DIAGNOSIS — M54.41 CHRONIC BILATERAL LOW BACK PAIN WITH BILATERAL SCIATICA: Primary | ICD-10-CM

## 2024-01-03 DIAGNOSIS — M54.42 CHRONIC BILATERAL LOW BACK PAIN WITH BILATERAL SCIATICA: Primary | ICD-10-CM

## 2024-01-03 RX ORDER — LORAZEPAM 1 MG/1
1 TABLET ORAL EVERY 8 HOURS PRN
Qty: 60 TABLET | Refills: 0 | Status: SHIPPED | OUTPATIENT
Start: 2024-01-03

## 2024-01-12 ENCOUNTER — HOSPITAL ENCOUNTER (EMERGENCY)
Age: 74
Discharge: HOME | End: 2024-01-12
Payer: MEDICARE

## 2024-01-12 VITALS
SYSTOLIC BLOOD PRESSURE: 138 MMHG | DIASTOLIC BLOOD PRESSURE: 71 MMHG | HEART RATE: 73 BPM | OXYGEN SATURATION: 96 % | TEMPERATURE: 98.06 F | RESPIRATION RATE: 20 BRPM

## 2024-01-12 VITALS
SYSTOLIC BLOOD PRESSURE: 138 MMHG | HEART RATE: 73 BPM | RESPIRATION RATE: 20 BRPM | OXYGEN SATURATION: 96 % | TEMPERATURE: 98.06 F | DIASTOLIC BLOOD PRESSURE: 71 MMHG

## 2024-01-12 VITALS — BODY MASS INDEX: 39.9 KG/M2
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
## 2024-01-12 DIAGNOSIS — K21.9: ICD-10-CM

## 2024-01-12 DIAGNOSIS — S83.91XA: Primary | ICD-10-CM

## 2024-01-12 DIAGNOSIS — I10: ICD-10-CM

## 2024-01-12 DIAGNOSIS — E78.5: ICD-10-CM

## 2024-01-12 DIAGNOSIS — E11.9: ICD-10-CM

## 2024-01-12 DIAGNOSIS — Z87.891: ICD-10-CM

## 2024-01-12 PROCEDURE — 99214 OFFICE O/P EST MOD 30 MIN: CPT

## 2024-01-12 PROCEDURE — 99212 OFFICE O/P EST SF 10 MIN: CPT

## 2024-01-12 PROCEDURE — G0463 HOSPITAL OUTPT CLINIC VISIT: HCPCS

## 2024-01-12 PROCEDURE — 73562 X-RAY EXAM OF KNEE 3: CPT

## 2024-01-27 DIAGNOSIS — I10 PRIMARY HYPERTENSION: ICD-10-CM

## 2024-01-29 RX ORDER — AMLODIPINE BESYLATE 10 MG/1
10 TABLET ORAL DAILY
Qty: 90 TABLET | Refills: 3 | Status: SHIPPED | OUTPATIENT
Start: 2024-01-29

## 2024-02-01 ENCOUNTER — TELEPHONE (OUTPATIENT)
Dept: FAMILY MEDICINE CLINIC | Facility: CLINIC | Age: 74
End: 2024-02-01
Payer: MEDICARE

## 2024-02-01 DIAGNOSIS — F41.9 ANXIETY: ICD-10-CM

## 2024-02-01 DIAGNOSIS — E11.43 TYPE 2 DIABETES MELLITUS WITH DIABETIC AUTONOMIC NEUROPATHY, WITHOUT LONG-TERM CURRENT USE OF INSULIN: Primary | ICD-10-CM

## 2024-02-01 NOTE — TELEPHONE ENCOUNTER
Caller: Jose A Villanueva    Relationship: Self    Best call back number: [phone]     Requested Prescriptions:   Requested Prescriptions     Pending Prescriptions Disp Refills    LORazepam (Ativan) 1 MG tablet 60 tablet 0     Sig: Take 1 tablet by mouth Every 8 (Eight) Hours As Needed for Anxiety.    pregabalin (LYRICA) 150 MG capsule       Sig: Take 1 capsule by mouth Every 12 (Twelve) Hours.    tiZANidine (ZANAFLEX) 4 MG tablet       Sig: Take 1 tablet by mouth At Night As Needed for Muscle Spasms.        Pharmacy where request should be sent: [facility] - [facility] [address] - [phone]  - [phone]     Last office visit with prescribing clinician: 12/21/2023   Last telemedicine visit with prescribing clinician: Visit date not found   Next office visit with prescribing clinician: 2/20/2024     Additional details provided by patient: HAS A FEW LEFT    Does the patient have less than a 3 day supply:  [x] Yes  [] No    Would you like a call back once the refill request has been completed: [] Yes [x] No    If the office needs to give you a call back, can they leave a voicemail: [] Yes [x] No    Wendy Uriostegui   02/01/24 13:55 EST

## 2024-02-01 NOTE — TELEPHONE ENCOUNTER
Caller: ALEYDA VITALE    Relationship: PATIENT    Callback number: [phone]   Is it ok to leave a message: [x] Yes [] No    Requested medication for samples: RYBELSIS 7MG    How much medication does the patient currently have left: IS OUT.    Who will be picking up the samples: PATIENT    Do you need information about patient financial assistance for this medication: [x] Yes [] No    Additional details provided: PLEASE ADVISE PATIENT IF YOU HAVE SAMPLES.   PATIENT ADVISED HE ALREADY FINISHED THE RYBELSIS 3 MG SAMPLES.

## 2024-02-02 RX ORDER — LORAZEPAM 1 MG/1
1 TABLET ORAL EVERY 8 HOURS PRN
Qty: 60 TABLET | Refills: 0 | Status: SHIPPED | OUTPATIENT
Start: 2024-02-02

## 2024-02-02 RX ORDER — PREGABALIN 150 MG/1
150 CAPSULE ORAL EVERY 12 HOURS SCHEDULED
Qty: 60 CAPSULE | Refills: 0 | Status: SHIPPED | OUTPATIENT
Start: 2024-02-02

## 2024-02-02 RX ORDER — TIZANIDINE 4 MG/1
4 TABLET ORAL NIGHTLY PRN
Qty: 90 TABLET | Refills: 1 | Status: SHIPPED | OUTPATIENT
Start: 2024-02-02

## 2024-02-12 RX ORDER — GLIPIZIDE 10 MG/1
10 TABLET ORAL
Qty: 180 TABLET | Refills: 3 | Status: SHIPPED | OUTPATIENT
Start: 2024-02-12

## 2024-02-20 ENCOUNTER — OFFICE VISIT (OUTPATIENT)
Dept: FAMILY MEDICINE CLINIC | Facility: CLINIC | Age: 74
End: 2024-02-20
Payer: MEDICARE

## 2024-02-20 VITALS
SYSTOLIC BLOOD PRESSURE: 136 MMHG | DIASTOLIC BLOOD PRESSURE: 82 MMHG | HEIGHT: 70 IN | RESPIRATION RATE: 18 BRPM | WEIGHT: 284 LBS | HEART RATE: 90 BPM | BODY MASS INDEX: 40.66 KG/M2 | OXYGEN SATURATION: 96 % | TEMPERATURE: 97.8 F

## 2024-02-20 DIAGNOSIS — F41.9 ANXIETY: ICD-10-CM

## 2024-02-20 DIAGNOSIS — N18.31 STAGE 3A CHRONIC KIDNEY DISEASE: ICD-10-CM

## 2024-02-20 DIAGNOSIS — E11.65 CONTROLLED TYPE 2 DIABETES MELLITUS WITH HYPERGLYCEMIA, WITH LONG-TERM CURRENT USE OF INSULIN: Primary | ICD-10-CM

## 2024-02-20 DIAGNOSIS — M48.062 SPINAL STENOSIS, LUMBAR REGION, WITH NEUROGENIC CLAUDICATION: ICD-10-CM

## 2024-02-20 DIAGNOSIS — I25.10 CORONARY ARTERY DISEASE INVOLVING NATIVE CORONARY ARTERY OF NATIVE HEART WITHOUT ANGINA PECTORIS: ICD-10-CM

## 2024-02-20 DIAGNOSIS — I10 PRIMARY HYPERTENSION: ICD-10-CM

## 2024-02-20 DIAGNOSIS — F51.01 PRIMARY INSOMNIA: ICD-10-CM

## 2024-02-20 DIAGNOSIS — Z79.4 CONTROLLED TYPE 2 DIABETES MELLITUS WITH HYPERGLYCEMIA, WITH LONG-TERM CURRENT USE OF INSULIN: Primary | ICD-10-CM

## 2024-02-20 DIAGNOSIS — B37.2 YEAST DERMATITIS: ICD-10-CM

## 2024-02-20 DIAGNOSIS — E78.2 MIXED HYPERLIPIDEMIA: ICD-10-CM

## 2024-02-20 DIAGNOSIS — G47.33 OBSTRUCTIVE SLEEP APNEA: ICD-10-CM

## 2024-02-20 LAB
EXPIRATION DATE: ABNORMAL
HBA1C MFR BLD: 8.1 % (ref 4.5–5.7)
Lab: ABNORMAL

## 2024-02-20 RX ORDER — LORAZEPAM 1 MG/1
1 TABLET ORAL EVERY 8 HOURS PRN
Qty: 60 TABLET | Refills: 0 | Status: SHIPPED | OUTPATIENT
Start: 2024-02-20 | End: 2024-02-26 | Stop reason: SDUPTHER

## 2024-02-20 RX ORDER — NYSTATIN 100000 U/G
1 CREAM TOPICAL 2 TIMES DAILY
Qty: 30 G | Refills: 1 | Status: SHIPPED | OUTPATIENT
Start: 2024-02-20

## 2024-02-20 NOTE — ASSESSMENT & PLAN NOTE
Blood pressure continues to be well-controlled on current medications, 136/82 in office today.  Continue HCTZ 25 mg once daily as well as metoprolol 100 mg twice daily.  Continue amlodipine 10 mg daily

## 2024-02-20 NOTE — ASSESSMENT & PLAN NOTE
Patient with yeast dermatitis of this of the groin, he was sending a prescription for nystatin cream on last visit but states it required prior authorization and once approval went through he did not go  the medication.  Will send in prescription for twice daily nystatin cream to be applied as directed to affected area

## 2024-02-20 NOTE — PROGRESS NOTES
Office Note     Name: Jose A Villanueva    : 1950     MRN: [id number]     Chief Complaint  Follow-up    Subjective     History of Present Illness:  Jose A Villanueva is a 73 y.o. male who presents today for follow-up on chronic conditions including type 2 diabetes, coronary artery disease, hyperlipidemia, PIPPA, hypertension, insomnia, spinal stenosis and CKD stage III.  Patient's blood pressure well-controlled on current medications, 136/82 in office today.  During his last visit 3 months ago he was initiated on Rybelsus with A1c of 8.6.  A1c showing slight improvement to 8.3.  Patient was supposed to utilize a 3 mg tablet Rybelsus for 4 weeks then transition to 7 mg, however he tells me today he just began taking 7 mg dose approximately 1 week ago.  Continues to check his glucose twice daily with readings ranging from 81-1 75.  In addition to Rybelsus he also utilizes glipizide 10 mg twice daily.  His metformin was stopped as his kidney disease advanced.  Currently being followed [facility] with GFR 55 on last check.  He has been asked to avoid nephrotoxic agents including NSAIDs but states there is no way he can completely avoid NSAIDs due to chronic back pain issues.  Has been evaluated by neurosurgery and pain management for advanced level degenerative changes.  He is currently receiving good benefit from physical therapy.  He has no further complaints or concerns today    Review of Systems   Constitutional:  Negative for unexpected weight loss.   Eyes:  Negative for blurred vision.   Respiratory:  Negative for cough, chest tightness and shortness of breath.    Cardiovascular:  Negative for chest pain, palpitations and leg swelling.   Gastrointestinal:  Negative for abdominal pain, constipation, diarrhea, nausea and vomiting.   Endocrine: Negative for polydipsia and polyuria.   Genitourinary:  Negative for difficulty urinating, frequency and urgency.   Musculoskeletal:  Positive for back pain.   Skin:  " Positive for rash.   Neurological:  Positive for confusion. Negative for tremors, speech difficulty, light-headedness and headache.       Objective     Past Medical History:   Diagnosis Date    Alcoholism     in remission    Anxiety     Arthritis     Asthma 2015    sleep apnea/APAP    Cervical disc disorder     Coronary artery disease involving native coronary artery of native heart without angina pectoris 2023    Depression     Diabetes mellitus     GERD (gastroesophageal reflux disease)     Hyperlipidemia     Hypertension     Low back pain     casual/ occasional    Peripheral neuropathy     Substance abuse     alcoholism    Thoracic disc disorder      Past Surgical History:   Procedure Laterality Date    CHOLECYSTECTOMY      EPIDURAL BLOCK      by pain doctor    KNEE ARTHROSCOPY Bilateral     REPLACEMENT TOTAL HIP ONCOLOGIC Right     TONSILLECTOMY       Family History   Problem Relation Age of Onset    Arthritis Mother     Hypertension Mother     COPD Mother             Arthritis Father     Hypertension Father     Hyperlipidemia Father             Alcohol abuse Brother     Drug abuse Brother             Early death Brother         49 yo       Vital Signs  /82 (BP Location: Left arm, Patient Position: Sitting, Cuff Size: Adult)   Pulse 90   Temp 97.8 °F (36.6 °C) (Temporal)   Resp 18   Ht 177.8 cm (70\")   Wt 129 kg (284 lb)   SpO2 96%   BMI 40.75 kg/m²   Estimated body mass index is 40.75 kg/m² as calculated from the following:    Height as of this encounter: 177.8 cm (70\").    Weight as of this encounter: 129 kg (284 lb).  Facility age limit for growth %marga is 20 years.    Physical Exam  Constitutional:       Appearance: He is obese.   HENT:      Head: Normocephalic and atraumatic.      Right Ear: Tympanic membrane, ear canal and external ear normal.      Left Ear: Tympanic membrane, ear canal and external ear normal.      Nose: Nose normal.      " Mouth/Throat:      Mouth: Mucous membranes are moist.      Pharynx: Oropharynx is clear.   Eyes:      Conjunctiva/sclera: Conjunctivae normal.      Pupils: Pupils are equal, round, and reactive to light.   Cardiovascular:      Rate and Rhythm: Normal rate and regular rhythm.   Pulmonary:      Effort: Pulmonary effort is normal.      Breath sounds: Normal breath sounds.   Abdominal:      General: Bowel sounds are normal.      Palpations: Abdomen is soft.   Musculoskeletal:         General: Normal range of motion.      Cervical back: Neck supple.   Skin:     General: Skin is warm and dry.   Neurological:      Mental Status: He is alert and oriented to person, place, and time.             POCT Results (if applicable):  Results for orders placed or performed in visit on 02/20/24   POC Glycosylated Hemoglobin (Hb A1C)    Specimen: Blood   Result Value Ref Range    Hemoglobin A1C 8.1 (A) 4.5 - 5.7 %    Lot Number [id number]     Expiration Date 9,282,025             Assessment and Plan     Diagnoses and all orders for this visit:    1. Controlled type 2 diabetes mellitus with hyperglycemia, with long-term current use of insulin (Primary)  Assessment & Plan:  Patient continues to check his glucose twice daily, reports glucose readings at home to be  fasting.  During his last visit 3 months ago he was initiated on Rybelsus with A1c of 8.6.  A1c showing slight improvement to 8.3.  Patient was supposed to utilize a 3 mg tablet Rybelsus for 4 weeks then transition to 7 mg, however he tells me today he just began taking 7 mg dose approximately 1 week ago.  Continues to check his glucose twice daily with readings ranging from 81-1 75.  In addition to Rybelsus he also utilizes glipizide 10 mg twice daily.  His metformin was stopped as his kidney disease advanced.  -Continue Rybelsus 7 mg daily  -Continue glipizide 10 mg twice daily  -Continue Lantus as directed.    Orders:  -     POC Glycosylated Hemoglobin (Hb A1C)    2.  Coronary artery disease involving native coronary artery of native heart without angina pectoris  Assessment & Plan:  LHC from 2/11/2023 revealed chronic total occlusion to the LAD extending from the ostium to the distal vessel and fed by collaterals from the RCA.  Mild nonobstructive CAD involving the proximal circumflex and proximal to mid RCA.   Patient denies chest pain, worsening shortness of breath or fatigue.   Lipid panel from 8/17/2023 reviewed, total cholesterol 142, triglycerides 144, HDL 31 and LDL 85.     -Continue aspirin, atorvastatin, metoprolol and amlodipine at current doses.      3. Mixed hyperlipidemia  Assessment & Plan:   Continue atorvastatin 40 mg daily as well as fenofibrate 160 mg daily      4. Obstructive sleep apnea  Assessment & Plan:  Patient reports excellent compliance with PAP use      5. Primary hypertension  Assessment & Plan:  Blood pressure continues to be well-controlled on current medications, 136/82 in office today.  Continue HCTZ 25 mg once daily as well as metoprolol 100 mg twice daily.  Continue amlodipine 10 mg daily      6. Primary insomnia  Assessment & Plan:  Longstanding pattern of insomnia, he is utilizing lorazepam nightly with good benefit for a number of years.  Poor response in the past to medication such as trazodone, Ambien and Lunesta.  Reviewed Jack with satisfactory findings.  He has controlled substance agreement and up-to-date UDS on file  -Continue lorazepam 0.5mg       7. Spinal stenosis, lumbar region, with neurogenic claudication  Assessment & Plan:  tashia has been evaluated by neurosurgery and now referred to pain management. Patient reports they would like to avoid surgery due to his weight and uncontrolled A1c. Per neurosurgery, based off of his MRI he does have some advanced level degenerative changes in the L3-4 facets as well as the interspinous bursa looks like it probably has a cyst in it at the L3-4 area. This is likely the cause of a lot of  his low back pain when he is laying down at night. Patient does have pretty predictable pain whenever he is walking that radiates down the lateral aspect of his thigh. Patient also has diabetic peripheral neuropathy in the left foot and has chronic burning in his left foot he notes excellent benefit from epidural injections that he received thus far.  Patient also feels that physical therapy is helping with his pain and mobility issues.       8. Anxiety  -     LORazepam (Ativan) 1 MG tablet; Take 1 tablet by mouth Every 8 (Eight) Hours As Needed for Anxiety.  Dispense: 60 tablet; Refill: 0    9. Yeast dermatitis  Assessment & Plan:  Patient with yeast dermatitis of this of the groin, he was sending a prescription for nystatin cream on last visit but states it required prior authorization and once approval went through he did not go  the medication.  Will send in prescription for twice daily nystatin cream to be applied as directed to affected area    Orders:  -     nystatin (MYCOSTATIN) 349359 UNIT/GM cream; Apply 1 Application topically to the appropriate area as directed 2 (Two) Times a Day.  Dispense: 30 g; Refill: 1    10. Stage 3a chronic kidney disease  Assessment & Plan:  Now seeing nephrology [facility] with GFR 55 on last check.  As a result of decreased kidney function he has been taken off metformin by his nephrologist in [place] before relocating.  At that time his A1c has been suboptimally controlled.  Today we discussed need for tight glycemic and blood pressure control to preserve renal function.  I discussed avoidance of nephrotoxic agents including NSAIDs.  He tells me there is no way that he can completely give up NSAIDs due to chronic back pain issues.                    Follow Up  Return in about 3 months (around 5/20/2024) for Next scheduled follow up.    Radha Asher, APRN

## 2024-02-20 NOTE — ASSESSMENT & PLAN NOTE
tashia has been evaluated by neurosurgery and now referred to pain management. Patient reports they would like to avoid surgery due to his weight and uncontrolled A1c. Per neurosurgery, based off of his MRI he does have some advanced level degenerative changes in the L3-4 facets as well as the interspinous bursa looks like it probably has a cyst in it at the L3-4 area. This is likely the cause of a lot of his low back pain when he is laying down at night. Patient does have pretty predictable pain whenever he is walking that radiates down the lateral aspect of his thigh. Patient also has diabetic peripheral neuropathy in the left foot and has chronic burning in his left foot he notes excellent benefit from epidural injections that he received thus far.  Patient also feels that physical therapy is helping with his pain and mobility issues.

## 2024-02-20 NOTE — ASSESSMENT & PLAN NOTE
Patient continues to check his glucose twice daily, reports glucose readings at home to be  fasting.  During his last visit 3 months ago he was initiated on Rybelsus with A1c of 8.6.  A1c showing slight improvement to 8.3.  Patient was supposed to utilize a 3 mg tablet Rybelsus for 4 weeks then transition to 7 mg, however he tells me today he just began taking 7 mg dose approximately 1 week ago.  Continues to check his glucose twice daily with readings ranging from 81-1 75.  In addition to Rybelsus he also utilizes glipizide 10 mg twice daily.  His metformin was stopped as his kidney disease advanced.  -Continue Rybelsus 7 mg daily  -Continue glipizide 10 mg twice daily  -Continue Lantus as directed.

## 2024-02-26 DIAGNOSIS — F41.9 ANXIETY: ICD-10-CM

## 2024-02-26 DIAGNOSIS — E11.43 TYPE 2 DIABETES MELLITUS WITH DIABETIC AUTONOMIC NEUROPATHY, WITHOUT LONG-TERM CURRENT USE OF INSULIN: ICD-10-CM

## 2024-02-26 RX ORDER — LORAZEPAM 1 MG/1
1 TABLET ORAL EVERY 8 HOURS PRN
Qty: 60 TABLET | Refills: 0 | Status: SHIPPED | OUTPATIENT
Start: 2024-02-26

## 2024-02-26 RX ORDER — PREGABALIN 150 MG/1
150 CAPSULE ORAL EVERY 12 HOURS SCHEDULED
Qty: 60 CAPSULE | Refills: 0 | Status: SHIPPED | OUTPATIENT
Start: 2024-02-26

## 2024-02-26 NOTE — TELEPHONE ENCOUNTER
Caller: Jose A Villanueva    Relationship: Self    Best call back number: [phone]     Requested Prescriptions:   Requested Prescriptions     Pending Prescriptions Disp Refills    pregabalin (LYRICA) 150 MG capsule 60 capsule 0     Sig: Take 1 capsule by mouth Every 12 (Twelve) Hours.    LORazepam (Ativan) 1 MG tablet 60 tablet 0     Sig: Take 1 tablet by mouth Every 8 (Eight) Hours As Needed for Anxiety.        Pharmacy where request should be sent: [facility] - [place] [id number] - [phone]  - [phone]      Last office visit with prescribing clinician: 2/20/2024   Last telemedicine visit with prescribing clinician: Visit date not found   Next office visit with prescribing clinician: 5/21/2024     Additional details provided by patient:     Does the patient have less than a 3 day supply:  [] Yes  [x] No    Would you like a call back once the refill request has been completed: [] Yes [x] No    If the office needs to give you a call back, can they leave a voicemail: [] Yes [x] No    Wendy Ludwig Rep   02/26/24 10:33 EST

## 2024-03-08 ENCOUNTER — OFFICE VISIT (OUTPATIENT)
Dept: CARDIOLOGY | Facility: CLINIC | Age: 74
End: 2024-03-08
Payer: MEDICARE

## 2024-03-08 VITALS
DIASTOLIC BLOOD PRESSURE: 80 MMHG | BODY MASS INDEX: 40.8 KG/M2 | HEIGHT: 70 IN | SYSTOLIC BLOOD PRESSURE: 116 MMHG | HEART RATE: 77 BPM | WEIGHT: 285 LBS | OXYGEN SATURATION: 95 %

## 2024-03-08 DIAGNOSIS — G47.33 OBSTRUCTIVE SLEEP APNEA: Primary | Chronic | ICD-10-CM

## 2024-03-08 DIAGNOSIS — I10 PRIMARY HYPERTENSION: Chronic | ICD-10-CM

## 2024-03-08 RX ORDER — ZOLPIDEM TARTRATE 5 MG/1
5 TABLET ORAL NIGHTLY PRN
Qty: 1 TABLET | Refills: 0 | Status: SHIPPED | OUTPATIENT
Start: 2024-03-08

## 2024-03-08 NOTE — ASSESSMENT & PLAN NOTE
Patient has no recent sleep study.  He is wanting to see if he can get a new machine.  He says his is greater than 5 years old.  Last sleep study was done in [facility] several years ago.  He state his machine sometimes makes a whistling noise that wakes him up.  He also states the pressures adjusting up and down at night wake him up and he has to do the Ramp for 45 minutes to get back to sleep.  
Patients BP is well controlled 116/80 with Amlodipine, HCTZ, metoprolol, and pap therapy.  Untreated sleep apnea may potentiate HTN.  Plan to continue current medications and pap therapy.  
Yes

## 2024-03-08 NOTE — PROGRESS NOTES
Follow-Up Sleep Consult     Date:   2024  Name: Jose A Villanueva  :   1950  PCP: Radha Asher APRN    Chief Complaint   Patient presents with   • Sleep Apnea       Subjective     History of Present Illness  Jose A Villanueva is a 73 y.o. male who presents today for follow-up on PIPPA.    Patient states he can not sleep without his pap machine.  He is want to see about getting a new one.  He says his is greater  than five years old and starting to make a whistling sound that wakes him up.  He states his last sleep study was done by his pulmonologist in [place] at [facility].  He says the airflow is sometimes to strong.  He says he has to push the ramp for about 45 minutes to get back comfortable again.  He is agreeable to doing a titration study at [facility].  He says he will probably need something to be able to sleep.  He says he sometimes does not go to sleep before 1am.  He states he has had Ambien in the past before his last sleep study.  Ambien one 5mg tablet has been sent to [facility].  Ambien to be taken 1 hour before the sleep study.  Jack has been reviewed.    Cardiac/PIPPA history:    1.  PIPPA with unknown baseline AHI  Current treatment 5-15cm    2.  CAD  3.  Hypertension  4.  Mild aortic stenosis    [facility] 2023-  ·  Chronic total occlusion to the LAD extending from the ostium to the   distal vessel and fed by collaterals from the RCA   ·  Mild nonobstructive coronary artery disease involving the proximal LCx   and proximal to mid RCA.   ·  Mildly elevated LVEDP at 24 mmHg    Echocardiogram 10/25/2022-  LVEF 55%.  Abnormal grade 1 (mild/impaired relaxation) left ventricular diastolic dysfunction.  Mild concentric LVH.  RVSP is normal.  Mild aortic stenosis.  Peak gradient 33 mmHg.  Mean gradient 18 mmHg    Current mask used is NM    Device Functioning Well: No, Device is greater than 5 years old , Older Device , and It sometimes whistles waking him up  Mask  Fit Comfortable: Yes  Air Flow Comfortable: Yes  DME Helpful for Supplies: Yes  Sleep is rested: Yes        Device Download:                 The patient's relevant past medical, surgical, family, and social history reviewed and updated in Epic as appropriate.    Past Medical History:   Diagnosis Date   • Alcoholism 1992    in remission   • Anxiety    • Arthritis    • Asthma 2015    sleep apnea/APAP   • Cervical disc disorder 1986   • Coronary artery disease involving native coronary artery of native heart without angina pectoris 8/29/2023   • Depression    • Diabetes mellitus    • GERD (gastroesophageal reflux disease)    • Hyperlipidemia    • Hypertension    • Low back pain     casual/ occasional   • Peripheral neuropathy 2020   • Substance abuse 1992    alcoholism   • Thoracic disc disorder 1986     Past Surgical History:   Procedure Laterality Date   • CHOLECYSTECTOMY     • EPIDURAL BLOCK  2022    by pain doctor   • KNEE ARTHROSCOPY Bilateral    • REPLACEMENT TOTAL HIP ONCOLOGIC Right    • TONSILLECTOMY         Allergies   Allergen Reactions   • Cerumenex [Trolamine (Triethanolamine)] Rash     Prior to Admission medications    Medication Sig Start Date End Date Taking? Authorizing Provider   amLODIPine (NORVASC) 10 MG tablet TAKE 1 TABLET EVERY DAY 1/29/24  Yes Radha Asher APRN   aspirin 81 MG oral suspension    Yes ProviderHero MD   atorvastatin (Lipitor) 40 MG tablet Take 1 tablet by mouth Daily. 8/10/23  Yes Radha Asher APRN   Blood Glucose Monitoring Suppl (D-Care Glucometer) w/Device kit 1 kit 3 (Three) Times a Day. Indications: may substitute for any glucometer kit that is approved and availabe 6/23/23  Yes Radha Asher APRN   fenofibrate 160 MG tablet Take 1 tablet by mouth Daily. 4/13/23  Yes ProviderHero MD   glipizide (GLUCOTROL) 10 MG tablet TAKE 1 TABLET BY MOUTH 2 (TWO) TIMES A DAY BEFORE MEALS. 2/12/24  Yes Radha Asher APRN   hydroCHLOROthiazide (HYDRODIURIL)  "25 MG tablet Take 1 tablet by mouth Daily. 8/10/23  Yes Radha Asher APRN   Insulin Glargine (Lantus SoloStar) 100 UNIT/ML injection pen INJECT 60 TO 80 UNITS UNDER THE SKIN ONE TIME DAILY AS DIRECTED 23  Yes Radha Asher APRN   LORazepam (Ativan) 1 MG tablet Take 1 tablet by mouth Every 8 (Eight) Hours As Needed for Anxiety. 24  Yes Radha Asher APRN   metoprolol tartrate (LOPRESSOR) 100 MG tablet Take 1 tablet by mouth Daily.  Patient taking differently: Take 1 tablet by mouth 2 (Two) Times a Day. 23  Yes Radha Asher APRN   nystatin (MYCOSTATIN) 572850 UNIT/GM cream Apply 1 Application topically to the appropriate area as directed 2 (Two) Times a Day. 24  Yes Radha Asher APRN   pantoprazole (PROTONIX) 40 MG EC tablet Take 1 tablet by mouth Daily. 23  Yes Provider, MD Hero   pregabalin (LYRICA) 150 MG capsule Take 1 capsule by mouth Every 12 (Twelve) Hours. 24  Yes Radha Asher APRN   Semaglutide 7 MG tablet Take 7 mg by mouth Daily. 24  Yes Radha Asher APRN   sertraline (ZOLOFT) 50 MG tablet TAKE 1 TABLET EVERY DAY 23  Yes Radha Asher APRN   tiZANidine (ZANAFLEX) 4 MG tablet Take 1 tablet by mouth At Night As Needed for Muscle Spasms. 24  Yes Radha Asher APRN     Family History   Problem Relation Age of Onset   • Arthritis Mother    • Hypertension Mother    • COPD Mother            • Arthritis Father    • Hypertension Father    • Hyperlipidemia Father            • Alcohol abuse Brother    • Drug abuse Brother            • Early death Brother         49 yo       Objective     Vital Signs:  /80   Pulse 77   Ht 177.8 cm (70\")   Wt 129 kg (285 lb)   SpO2 95%   BMI 40.89 kg/m²              Physical Exam    The following data was reviewed by: BRENDA Samuel on 2024:    Thirty day download 24 to 3/6/24 I have reviewed and interpreted the data from download.  Download " shows good compliance and control.  AHI 3 on current download.  He states airflow is sometimes uncomfortable.  He says he has to push the ramp button that lowers the pressure for 45 minutes so he can get comfortable.  Mask fit is comfortable. He says his machine is starting to whistle causing him to wake up during the night.  He would like to see if he can get a new machine.  His machine he says is greater than 5 years old.  He has no recent sleep study.  He is agreeable to doing a titration study.  Plan to continue current treatment and order a titration study a [facility].         Assessment and Plan     Diagnoses and all orders for this visit:    1. Obstructive sleep apnea (Primary)  Assessment & Plan:  Patient has no recent sleep study.  He is wanting to see if he can get a new machine.  He says his is greater than 5 years old.  Last sleep study was done in [facility] several years ago.  He state his machine sometimes makes a whistling noise that wakes him up.  He also states the pressures adjusting up and down at night wake him up and he has to do the Ramp for 45 minutes to get back to sleep.    Orders:  -     Cancel: Titration; Future  -     Cancel: Titration; Future  -     zolpidem (Ambien) 5 MG tablet; Take 1 tablet by mouth At Night As Needed for Sleep (To take 1 hour before sleep study.).  Dispense: 1 tablet; Refill: 0  -     Titration; Future    2. Primary hypertension  Assessment & Plan:  Patients BP is well controlled 116/80 with Amlodipine, HCTZ, metoprolol, and pap therapy.  Untreated sleep apnea may potentiate HTN.  Plan to continue current medications and pap therapy.          Report if any new/changing symptoms immediately, Sleep risks reviewed (driving, medical, sleep death, sedating agents), and Sleep hygiene discussed         Follow Up  Return in about 4 weeks (around 4/5/2024) for Next scheduled follow up.  Patient was given instructions and counseling regarding his condition or for health maintenance  advice. Please see specific information pulled into the AVS if appropriate.

## 2024-03-10 DIAGNOSIS — F41.9 ANXIETY: ICD-10-CM

## 2024-03-11 RX ORDER — LORAZEPAM 1 MG/1
1 TABLET ORAL EVERY 8 HOURS PRN
Qty: 60 TABLET | OUTPATIENT
Start: 2024-03-11

## 2024-03-21 ENCOUNTER — HOSPITAL ENCOUNTER (OUTPATIENT)
Dept: HOSPITAL 22 - PT | Age: 74
Discharge: HOME | End: 2024-03-21
Payer: MEDICARE

## 2024-03-21 DIAGNOSIS — M54.42: Primary | ICD-10-CM

## 2024-03-21 DIAGNOSIS — M54.41: ICD-10-CM

## 2024-03-21 PROCEDURE — 97112 NEUROMUSCULAR REEDUCATION: CPT

## 2024-03-21 PROCEDURE — 97110 THERAPEUTIC EXERCISES: CPT

## 2024-03-21 PROCEDURE — 97163 PT EVAL HIGH COMPLEX 45 MIN: CPT

## 2024-03-21 PROCEDURE — 97164 PT RE-EVAL EST PLAN CARE: CPT

## 2024-03-21 PROCEDURE — 97530 THERAPEUTIC ACTIVITIES: CPT

## 2024-03-21 PROCEDURE — 97116 GAIT TRAINING THERAPY: CPT

## 2024-03-22 DIAGNOSIS — G47.33 OBSTRUCTIVE SLEEP APNEA: Chronic | ICD-10-CM

## 2024-03-26 ENCOUNTER — HOSPITAL ENCOUNTER (OUTPATIENT)
Dept: HOSPITAL 22 - RAD | Age: 74
End: 2024-03-26
Payer: MEDICARE

## 2024-03-26 DIAGNOSIS — M79.641: Primary | ICD-10-CM

## 2024-03-26 PROCEDURE — 73130 X-RAY EXAM OF HAND: CPT

## 2024-04-02 DIAGNOSIS — F41.9 ANXIETY: ICD-10-CM

## 2024-04-02 DIAGNOSIS — E11.43 TYPE 2 DIABETES MELLITUS WITH DIABETIC AUTONOMIC NEUROPATHY, WITHOUT LONG-TERM CURRENT USE OF INSULIN: ICD-10-CM

## 2024-04-02 RX ORDER — PREGABALIN 150 MG/1
150 CAPSULE ORAL EVERY 12 HOURS SCHEDULED
Qty: 60 CAPSULE | Refills: 0 | Status: SHIPPED | OUTPATIENT
Start: 2024-04-02

## 2024-04-02 RX ORDER — LORAZEPAM 1 MG/1
1 TABLET ORAL EVERY 8 HOURS PRN
Qty: 60 TABLET | Refills: 0 | Status: SHIPPED | OUTPATIENT
Start: 2024-04-02

## 2024-04-02 NOTE — TELEPHONE ENCOUNTER
Caller: Jose A Villanueva    Relationship: Self    Best call back number: [phone]    Requested Prescriptions:   Requested Prescriptions     Pending Prescriptions Disp Refills    LORazepam (Ativan) 1 MG tablet 60 tablet 0     Sig: Take 1 tablet by mouth Every 8 (Eight) Hours As Needed for Anxiety.    pregabalin (LYRICA) 150 MG capsule 60 capsule 0     Sig: Take 1 capsule by mouth Every 12 (Twelve) Hours.        Pharmacy where request should be sent: [facility] - [place] [id number] [facility] - [phone]  - [phone]      Last office visit with prescribing clinician: 2/20/2024   Last telemedicine visit with prescribing clinician: Visit date not found   Next office visit with prescribing clinician: 5/21/2024     Additional details provided by patient:  PATIENT ADVISED NEEDS PRIOR AUTHORIZATION FROM INSURANCE ON BOTH MEDICATIONS.    Does the patient have less than a 3 day supply:  [] Yes  [x] No    Would you like a call back once the refill request has been completed: [] Yes [x] No    If the office needs to give you a call back, can they leave a voicemail: [] Yes [x] No    Wendy Uriostegui Rep   04/02/24 09:05 EDT

## 2024-04-12 ENCOUNTER — OFFICE VISIT (OUTPATIENT)
Dept: CARDIOLOGY | Facility: CLINIC | Age: 74
End: 2024-04-12
Payer: MEDICARE

## 2024-04-12 VITALS
HEIGHT: 70 IN | HEART RATE: 79 BPM | SYSTOLIC BLOOD PRESSURE: 104 MMHG | OXYGEN SATURATION: 96 % | WEIGHT: 288 LBS | DIASTOLIC BLOOD PRESSURE: 52 MMHG | BODY MASS INDEX: 41.23 KG/M2

## 2024-04-12 DIAGNOSIS — I10 PRIMARY HYPERTENSION: Chronic | ICD-10-CM

## 2024-04-12 DIAGNOSIS — G47.33 OBSTRUCTIVE SLEEP APNEA: Primary | Chronic | ICD-10-CM

## 2024-04-12 NOTE — ASSESSMENT & PLAN NOTE
Patient baseline AHI unknown.  He has been on CPAP therapy for several years.  Patient's recent titration study showed a successful CPAP titration to 8 cm.  Download on his current machine shows good compliance and control.  Mask fit is comfortable.  He is wanting to get a new machine because his is greater than 5 years old, and he has a piece of cardboard between the humidity tray and the machine to keep it from making noise.  Patient states his sleep feels rested.  He says he cannot sleep without having his CPAP.  He is receiving benefit from PAP therapy.  Plan to continue current treatment.  Order for new ResMed CPAP at the settings of 4-10 with PAP supplies has been sent to DME of patient choice.

## 2024-04-12 NOTE — ASSESSMENT & PLAN NOTE
Patient's blood pressure is managed by his PCP.  Blood pressure is well-controlled in the office today 104/52.  Patient to continue on his current medicines along with his CPAP therapy.  Untreated sleep apnea may potentiate hypertension requiring multiple medications to help control.

## 2024-04-12 NOTE — PROGRESS NOTES
"    Follow-Up Sleep Consult     Date:   2024  Name: Jose A Villanueva  :   1950  PCP: Radha Asher APRN    Chief Complaint   Patient presents with    Follow-up     Patient here for 4 week follow up with PIPPA, CAD, HTN. Patient states he feels like his upper number of his CPAP might be too high- and it wakes him up \"blowing too much\" No other complaints.        Subjective     History of Present Illness  Jose A Villanueva is a 73 y.o. male who presents today for follow-up on PIPPA.    Patient is here today to follow-up on his titration results.  These results have been reviewed and discussed with patient.  He is also wanting to get a new machine as his is very old and he has to keep a piece of cardboard between the humidity tray and the machine to keep it from making noise.  Patient reports that he sometimes feels like his present CPAP machine has too high of a pressure.  Patient states his sleep feels rested.  Download shows good compliance and control.  Mask fit is comfortable.  Plan to continue current treatment.      Cardiac/PIPPA history:    1.  PIPPA with unknown baseline AHI  Current treatment 5-15cm    2.  CAD  3.  Hypertension  4.  Mild aortic stenosis    [facility] 2023-  ·  Chronic total occlusion to the LAD extending from the ostium to the   distal vessel and fed by collaterals from the RCA   ·  Mild nonobstructive coronary artery disease involving the proximal LCx   and proximal to mid RCA.   ·  Mildly elevated LVEDP at 24 mmHg    Echocardiogram 10/25/2022-  LVEF 55%.  Abnormal grade 1 (mild/impaired relaxation) left ventricular diastolic dysfunction.  Mild concentric LVH.  RVSP is normal.  Mild aortic stenosis.  Peak gradient 33 mmHg.  Mean gradient 18 mmHg      Current mask used is NM    Device Functioning Well: No, Device is greater than 5 years old  and Older Device   Mask Fit Comfortable: Yes  Air Flow Comfortable: No, Too Strong  DME Helpful for Supplies: Yes  Sleep is rested: Yes        Device " Download:                 The patient's relevant past medical, surgical, family, and social history reviewed and updated in Epic as appropriate.    Past Medical History:   Diagnosis Date    Alcoholism 1992    in remission    Anxiety     Arthritis     Asthma 2015    sleep apnea/APAP    Cervical disc disorder 1986    Coronary artery disease involving native coronary artery of native heart without angina pectoris 8/29/2023    Depression     Diabetes mellitus     GERD (gastroesophageal reflux disease)     Hyperlipidemia     Hypertension     Low back pain     casual/ occasional    Peripheral neuropathy 2020    Substance abuse 1992    alcoholism    Thoracic disc disorder 1986     Past Surgical History:   Procedure Laterality Date    CHOLECYSTECTOMY      EPIDURAL BLOCK  2022    by pain doctor    KNEE ARTHROSCOPY Bilateral     REPLACEMENT TOTAL HIP ONCOLOGIC Right     TONSILLECTOMY         Allergies   Allergen Reactions    Cerumenex [Trolamine (Triethanolamine)] Rash     Prior to Admission medications    Medication Sig Start Date End Date Taking? Authorizing Provider   amLODIPine (NORVASC) 10 MG tablet TAKE 1 TABLET EVERY DAY 1/29/24  Yes Radha Asher APRN   aspirin 81 MG oral suspension    Yes ProviderHero MD   atorvastatin (Lipitor) 40 MG tablet Take 1 tablet by mouth Daily. 8/10/23  Yes Radha Asher APRN   Blood Glucose Monitoring Suppl (D-Care Glucometer) w/Device kit 1 kit 3 (Three) Times a Day. Indications: may substitute for any glucometer kit that is approved and availabe 6/23/23  Yes Radha Asher APRN   fenofibrate 160 MG tablet Take 1 tablet by mouth Daily. 4/13/23  Yes ProviderHero MD   glipizide (GLUCOTROL) 10 MG tablet TAKE 1 TABLET BY MOUTH 2 (TWO) TIMES A DAY BEFORE MEALS. 2/12/24  Yes Radha Asher APRN   hydroCHLOROthiazide (HYDRODIURIL) 25 MG tablet Take 1 tablet by mouth Daily. 8/10/23  Yes Radha Asher APRN   Insulin Glargine (Lantus SoloStar) 100 UNIT/ML  "injection pen INJECT 60 TO 80 UNITS UNDER THE SKIN ONE TIME DAILY AS DIRECTED  Patient taking differently: 80 Units Every Night. INJECT 60 TO 80 UNITS UNDER THE SKIN ONE TIME DAILY AS DIRECTED 23  Yes Radha Asher APRN   LORazepam (Ativan) 1 MG tablet Take 1 tablet by mouth Every 8 (Eight) Hours As Needed for Anxiety. 24  Yes Radha Asher APRN   nystatin (MYCOSTATIN) 178939 UNIT/GM cream Apply 1 Application topically to the appropriate area as directed 2 (Two) Times a Day. 24  Yes Radha Asher APRN   pantoprazole (PROTONIX) 40 MG EC tablet Take 1 tablet by mouth Daily. 23  Yes Provider, MD Hero   pregabalin (LYRICA) 150 MG capsule Take 1 capsule by mouth Every 12 (Twelve) Hours. 24  Yes Radha Asher APRN   Semaglutide 7 MG tablet Take 7 mg by mouth Daily. 24  Yes Radha Asher APRN   sertraline (ZOLOFT) 50 MG tablet TAKE 1 TABLET EVERY DAY 23  Yes Radha Asher APRN   tiZANidine (ZANAFLEX) 4 MG tablet Take 1 tablet by mouth At Night As Needed for Muscle Spasms. 24  Yes Radha Ahser APRN   metoprolol tartrate (LOPRESSOR) 100 MG tablet Take 1 tablet by mouth Daily.  Patient taking differently: Take 1 tablet by mouth 2 (Two) Times a Day. 23   Radha Asher APRN   zolpidem (Ambien) 5 MG tablet Take 1 tablet by mouth At Night As Needed for Sleep (To take 1 hour before sleep study.). 3/8/24 4/12/24  Carla Kaye APRN     Family History   Problem Relation Age of Onset    Arthritis Mother     Hypertension Mother     COPD Mother             Arthritis Father     Hypertension Father     Hyperlipidemia Father             Alcohol abuse Brother     Drug abuse Brother             Early death Brother         51 yo       Objective     Vital Signs:  /52 (BP Location: Right arm, Patient Position: Sitting, Cuff Size: Adult)   Pulse 79   Ht 177.8 cm (70\")   Wt 131 kg (288 lb)   SpO2 96%   BMI 41.32 kg/m²      "         Physical Exam  Constitutional:       Appearance: Normal appearance.   Pulmonary:      Effort: Pulmonary effort is normal.      Breath sounds: Normal breath sounds.   Skin:     General: Skin is warm and dry.   Neurological:      General: No focal deficit present.      Mental Status: He is alert and oriented to person, place, and time.   Psychiatric:         Mood and Affect: Mood normal.         Behavior: Behavior normal.         The following data was reviewed by: BRENDA Samuel on 04/12/2024:  Titration study reviewed and discussed with patient.  Patient had a successful CPAP titration to 8 cm.    30-day download 3/12/2024 to 4/10/2024 I have reviewed and interpreted the data of the download.  Download shows good compliance and control with an AHI of 3.  Mask fit is comfortable.  Patient states that airflow sometimes feels too high.  He is wanting to get a new machine because his is greater than 5 years old, and he keeps a piece of cardboard between the humidity tray and the machine itself to keep it from making noise.         Assessment and Plan     Diagnoses and all orders for this visit:    1. Obstructive sleep apnea (Primary)  -     PAP Therapy        Report if any new/changing symptoms immediately, Sleep risks reviewed (driving, medical, sleep death, sedating agents), and Sleep hygiene discussed         Follow Up  Return in about 6 weeks (around 5/24/2024) for 31-90 day insurance compliance visit..  Patient was given instructions and counseling regarding his condition or for health maintenance advice. Please see specific information pulled into the AVS if appropriate.

## 2024-04-16 DIAGNOSIS — E78.5 HYPERLIPIDEMIA, UNSPECIFIED HYPERLIPIDEMIA TYPE: ICD-10-CM

## 2024-04-16 DIAGNOSIS — B37.2 YEAST DERMATITIS: ICD-10-CM

## 2024-04-16 DIAGNOSIS — F41.9 ANXIETY: ICD-10-CM

## 2024-04-16 DIAGNOSIS — E11.43 TYPE 2 DIABETES MELLITUS WITH DIABETIC AUTONOMIC NEUROPATHY, WITHOUT LONG-TERM CURRENT USE OF INSULIN: ICD-10-CM

## 2024-04-16 DIAGNOSIS — I10 PRIMARY HYPERTENSION: ICD-10-CM

## 2024-04-16 RX ORDER — HYDROCHLOROTHIAZIDE 25 MG/1
25 TABLET ORAL DAILY
Qty: 90 TABLET | Refills: 3 | Status: SHIPPED | OUTPATIENT
Start: 2024-04-16

## 2024-04-16 RX ORDER — PREGABALIN 150 MG/1
150 CAPSULE ORAL EVERY 12 HOURS
Qty: 60 CAPSULE | Refills: 0 | Status: SHIPPED | OUTPATIENT
Start: 2024-04-16 | End: 2024-04-22 | Stop reason: SDUPTHER

## 2024-04-16 RX ORDER — ATORVASTATIN CALCIUM 40 MG/1
40 TABLET, FILM COATED ORAL DAILY
Qty: 90 TABLET | Refills: 3 | Status: SHIPPED | OUTPATIENT
Start: 2024-04-16

## 2024-04-16 RX ORDER — ORAL SEMAGLUTIDE 7 MG/1
1 TABLET ORAL DAILY
Qty: 90 TABLET | Refills: 3 | Status: SHIPPED | OUTPATIENT
Start: 2024-04-16

## 2024-04-16 RX ORDER — LORAZEPAM 1 MG/1
1 TABLET ORAL EVERY 8 HOURS PRN
Qty: 60 TABLET | Refills: 0 | Status: SHIPPED | OUTPATIENT
Start: 2024-04-16 | End: 2024-04-22 | Stop reason: SDUPTHER

## 2024-04-16 RX ORDER — NYSTATIN 100000 U/G
CREAM TOPICAL SEE ADMIN INSTRUCTIONS
Qty: 30 G | Refills: 11 | Status: SHIPPED | OUTPATIENT
Start: 2024-04-16

## 2024-04-18 ENCOUNTER — TELEPHONE (OUTPATIENT)
Dept: FAMILY MEDICINE CLINIC | Facility: CLINIC | Age: 74
End: 2024-04-18
Payer: MEDICARE

## 2024-04-18 DIAGNOSIS — F41.9 ANXIETY: ICD-10-CM

## 2024-04-18 DIAGNOSIS — E11.43 TYPE 2 DIABETES MELLITUS WITH DIABETIC AUTONOMIC NEUROPATHY, WITHOUT LONG-TERM CURRENT USE OF INSULIN: ICD-10-CM

## 2024-04-18 NOTE — TELEPHONE ENCOUNTER
Caller: Jose A Villanueva    Relationship: Self    Best call back number: [phone]     Which medication are you concerned about: LORAZEPAM (ATIVAN) 1 MG AND PREGABALIN (LYRICA) 150 MG    Who prescribed you this medication: MICKEY GUTIERREZ    What are your concerns: PATIENT ADVISES THESE TWO MEDICATIONS WERE SENT TO THE WRONG PHARMACY AND NEEDS TO BE RESENT TO THE PHARMACY BELOW:    [id number] [place] - [address] [address] [address] AT [address] & [facility] - [phone] [facility] [phone] FX

## 2024-04-22 RX ORDER — LORAZEPAM 1 MG/1
1 TABLET ORAL EVERY 8 HOURS PRN
Qty: 60 TABLET | Refills: 0 | Status: SHIPPED | OUTPATIENT
Start: 2024-04-22

## 2024-04-22 RX ORDER — PREGABALIN 150 MG/1
150 CAPSULE ORAL EVERY 12 HOURS
Qty: 60 CAPSULE | Refills: 0 | Status: SHIPPED | OUTPATIENT
Start: 2024-04-22

## 2024-05-03 ENCOUNTER — TELEPHONE (OUTPATIENT)
Dept: FAMILY MEDICINE CLINIC | Facility: CLINIC | Age: 74
End: 2024-05-03
Payer: MEDICARE

## 2024-05-03 NOTE — TELEPHONE ENCOUNTER
Caller: Jose A Villanueva    Relationship: Self    Best call back number: [phone]     What test/procedure requested: MEDICATION    pregabalin (LYRICA) 150 MG capsule    When is it needed: ASAP    Where is the test/procedure going to be performed: [id number] - [place], [place] - [address] [address] [address] AT [address] [phone] [facility] [phone] FX     Additional information or concerns: PATIENTS INSURANCE REQUIRES A PRIOR AUTHORIZATION

## 2024-05-21 ENCOUNTER — OFFICE VISIT (OUTPATIENT)
Dept: FAMILY MEDICINE CLINIC | Facility: CLINIC | Age: 74
End: 2024-05-21
Payer: MEDICARE

## 2024-05-21 VITALS
SYSTOLIC BLOOD PRESSURE: 112 MMHG | OXYGEN SATURATION: 94 % | HEART RATE: 94 BPM | TEMPERATURE: 98.6 F | WEIGHT: 292 LBS | BODY MASS INDEX: 41.8 KG/M2 | RESPIRATION RATE: 18 BRPM | HEIGHT: 70 IN | DIASTOLIC BLOOD PRESSURE: 74 MMHG

## 2024-05-21 DIAGNOSIS — I25.10 CORONARY ARTERY DISEASE INVOLVING NATIVE CORONARY ARTERY OF NATIVE HEART WITHOUT ANGINA PECTORIS: ICD-10-CM

## 2024-05-21 DIAGNOSIS — N40.1 BENIGN PROSTATIC HYPERPLASIA WITH WEAK URINARY STREAM: ICD-10-CM

## 2024-05-21 DIAGNOSIS — E66.01 MORBID (SEVERE) OBESITY DUE TO EXCESS CALORIES: ICD-10-CM

## 2024-05-21 DIAGNOSIS — Z79.4 TYPE 2 DIABETES MELLITUS WITH HYPERGLYCEMIA, WITH LONG-TERM CURRENT USE OF INSULIN: ICD-10-CM

## 2024-05-21 DIAGNOSIS — G47.33 OBSTRUCTIVE SLEEP APNEA: ICD-10-CM

## 2024-05-21 DIAGNOSIS — Z87.891 HISTORY OF SMOKING: ICD-10-CM

## 2024-05-21 DIAGNOSIS — R39.12 BENIGN PROSTATIC HYPERPLASIA WITH WEAK URINARY STREAM: ICD-10-CM

## 2024-05-21 DIAGNOSIS — I10 PRIMARY HYPERTENSION: ICD-10-CM

## 2024-05-21 DIAGNOSIS — M48.062 SPINAL STENOSIS, LUMBAR REGION, WITH NEUROGENIC CLAUDICATION: ICD-10-CM

## 2024-05-21 DIAGNOSIS — N18.31 STAGE 3A CHRONIC KIDNEY DISEASE: ICD-10-CM

## 2024-05-21 DIAGNOSIS — E78.2 MIXED HYPERLIPIDEMIA: ICD-10-CM

## 2024-05-21 DIAGNOSIS — Z79.4 CONTROLLED TYPE 2 DIABETES MELLITUS WITH HYPERGLYCEMIA, WITH LONG-TERM CURRENT USE OF INSULIN: Primary | ICD-10-CM

## 2024-05-21 DIAGNOSIS — E11.65 CONTROLLED TYPE 2 DIABETES MELLITUS WITH HYPERGLYCEMIA, WITH LONG-TERM CURRENT USE OF INSULIN: Primary | ICD-10-CM

## 2024-05-21 DIAGNOSIS — F41.9 ANXIETY: ICD-10-CM

## 2024-05-21 DIAGNOSIS — Z23 ENCOUNTER FOR IMMUNIZATION: ICD-10-CM

## 2024-05-21 DIAGNOSIS — E11.65 TYPE 2 DIABETES MELLITUS WITH HYPERGLYCEMIA, WITH LONG-TERM CURRENT USE OF INSULIN: ICD-10-CM

## 2024-05-21 DIAGNOSIS — F51.01 PRIMARY INSOMNIA: ICD-10-CM

## 2024-05-21 LAB
EXPIRATION DATE: ABNORMAL
HBA1C MFR BLD: 8.5 % (ref 4.5–5.7)
Lab: ABNORMAL

## 2024-05-21 RX ORDER — LORAZEPAM 1 MG/1
1 TABLET ORAL EVERY 6 HOURS PRN
Qty: 90 TABLET | Refills: 0 | Status: SHIPPED | OUTPATIENT
Start: 2024-05-21

## 2024-05-21 RX ORDER — ORAL SEMAGLUTIDE 14 MG/1
14 TABLET ORAL DAILY
Qty: 90 TABLET | Refills: 1 | Status: SHIPPED | OUTPATIENT
Start: 2024-05-21

## 2024-05-21 RX ORDER — TAMSULOSIN HYDROCHLORIDE 0.4 MG/1
1 CAPSULE ORAL DAILY
Qty: 90 CAPSULE | Refills: 1 | Status: SHIPPED | OUTPATIENT
Start: 2024-05-21

## 2024-05-21 NOTE — ASSESSMENT & PLAN NOTE
Home glucose readings reviewed. He presents averages of 130,134,150,157 and 158. A1C in office today is 8.5.  Patient currently utilizing Rybelsus 7 mg daily, glipizide 10 mg daily and Lantus 80 units nightly.  Will increase his Rybelsus to 14 mg daily and continue to monitor

## 2024-05-21 NOTE — PROGRESS NOTES
The ABCs of the Annual Wellness Visit  Subsequent Medicare Wellness Visit    Chief Complaint   Patient presents with    Medicare Wellness-subsequent      Subjective    History of Present Illness:  Jose A Villanueva is a 73 y.o. male who presents for a Subsequent Medicare Wellness Visit and annual physical exam.    The following portions of the patient's history were reviewed and   updated as appropriate: allergies, current medications, past family history, past medical history, past social history, past surgical history, and problem list.    Compared to one year ago, the patient feels his physical   health is better.    Compared to one year ago, the patient feels his mental   health is the same.    Recent Hospitalizations:  He was not admitted to the hospital during the last year.       Current Medical Providers:  Patient Care Team:  Radha Asher APRN as PCP - General (Family Medicine)    Outpatient Medications Prior to Visit   Medication Sig Dispense Refill    amLODIPine (NORVASC) 10 MG tablet TAKE 1 TABLET EVERY DAY 90 tablet 3    aspirin 81 MG oral suspension       atorvastatin (LIPITOR) 40 MG tablet TAKE 1 TABLET EVERY DAY 90 tablet 3    Blood Glucose Monitoring Suppl (D-Care Glucometer) w/Device kit 1 kit 3 (Three) Times a Day. Indications: may substitute for any glucometer kit that is approved and availabe 1 kit 0    fenofibrate 160 MG tablet Take 1 tablet by mouth Daily.      glipizide (GLUCOTROL) 10 MG tablet TAKE 1 TABLET BY MOUTH 2 (TWO) TIMES A DAY BEFORE MEALS. 180 tablet 3    hydroCHLOROthiazide 25 MG tablet TAKE 1 TABLET EVERY DAY 90 tablet 3    Insulin Glargine (Lantus SoloStar) 100 UNIT/ML injection pen INJECT 60 TO 80 UNITS UNDER THE SKIN ONE TIME DAILY AS DIRECTED (Patient taking differently: 80 Units Every Night. INJECT 60 TO 80 UNITS UNDER THE SKIN ONE TIME DAILY AS DIRECTED) 45 mL 3    metoprolol tartrate (LOPRESSOR) 100 MG tablet Take 1 tablet by mouth Daily. (Patient taking differently: Take 1  tablet by mouth 2 (Two) Times a Day.) 30 tablet 2    nystatin (MYCOSTATIN) 523912 UNIT/GM cream APPLY TOPICALLY TO THE APPROPRIATE AREA AS DIRECTED 2 (TWO) TIMES A DAY. 30 g 11    pantoprazole (PROTONIX) 40 MG EC tablet Take 1 tablet by mouth Daily.      pregabalin (LYRICA) 150 MG capsule Take 1 capsule by mouth Every 12 (Twelve) Hours. 60 capsule 0    sertraline (ZOLOFT) 50 MG tablet TAKE 1 TABLET EVERY DAY 90 tablet 3    tiZANidine (ZANAFLEX) 4 MG tablet Take 1 tablet by mouth At Night As Needed for Muscle Spasms. 90 tablet 1    LORazepam (ATIVAN) 1 MG tablet Take 1 tablet by mouth Every 8 (Eight) Hours As Needed for Anxiety. 60 tablet 0    Rybelsus 7 MG tablet TAKE 1 TABLET EVERY DAY 90 tablet 3     No facility-administered medications prior to visit.       No opioid medication identified on active medication list. I have reviewed chart for other potential  high risk medication/s and harmful drug interactions in the elderly.        Aspirin is on active medication list. Aspirin use is indicated based on review of current medical condition/s. Pros and cons of this therapy have been discussed today. Benefits of this medication outweigh potential harm.  Patient has been encouraged to continue taking this medication.  .      Patient Active Problem List   Diagnosis    Incomplete cauda equina syndrome    Lumbar radiculopathy    Type 2 diabetes mellitus with hyperglycemia, with long-term current use of insulin    Stage 3a chronic kidney disease    History of smoking    Primary hypertension    Hyperlipidemia    Heart murmur    Primary insomnia    Spinal stenosis, lumbar region, with neurogenic claudication    Fungal infection of the groin    Obstructive sleep apnea    Initial Medicare annual wellness visit    Aortic stenosis, mild    Coronary artery disease involving native coronary artery of native heart without angina pectoris    Other constipation    Acute mucoid otitis media of both ears    Yeast dermatitis    Morbid  "(severe) obesity due to excess calories    Benign prostatic hyperplasia with weak urinary stream     Advance Care Planning  Advance Directive is not on file.  ACP discussion was held with the patient during this visit. Patient does not have an advance directive, declines further assistance.    Review of Systems   Constitutional:  Negative for chills and fever.   Respiratory:  Negative for cough, chest tightness, shortness of breath and wheezing.    Cardiovascular:  Negative for chest pain, palpitations and leg swelling.   Gastrointestinal:  Negative for abdominal pain, constipation, diarrhea, nausea and vomiting.   Endocrine: Negative for polydipsia and polyuria.   Genitourinary:  Positive for frequency. Negative for difficulty urinating, hematuria and urgency.        Nocturia   Musculoskeletal:  Positive for arthralgias, back pain and myalgias.   Neurological:  Negative for dizziness, weakness, light-headedness and numbness.   Psychiatric/Behavioral:  Positive for sleep disturbance. Negative for agitation, self-injury and suicidal ideas. The patient is not nervous/anxious.         Objective    Vitals:    05/21/24 1329   BP: 112/74   BP Location: Left arm   Patient Position: Sitting   Cuff Size: Adult   Pulse: 94   Resp: 18   Temp: 98.6 °F (37 °C)   TempSrc: Temporal   SpO2: 94%   Weight: 132 kg (292 lb)   Height: 177.8 cm (70\")     Estimated body mass index is 41.9 kg/m² as calculated from the following:    Height as of this encounter: 177.8 cm (70\").    Weight as of this encounter: 132 kg (292 lb).           Does the patient have evidence of cognitive impairment? No    Physical Exam  Vitals reviewed.   Constitutional:       Appearance: Normal appearance. He is obese.   HENT:      Head: Normocephalic and atraumatic.      Right Ear: Tympanic membrane, ear canal and external ear normal.      Left Ear: Tympanic membrane, ear canal and external ear normal.      Nose: Nose normal.      Mouth/Throat:      Mouth: Mucous " membranes are moist.      Pharynx: Oropharynx is clear.   Eyes:      Conjunctiva/sclera: Conjunctivae normal.      Pupils: Pupils are equal, round, and reactive to light.   Cardiovascular:      Rate and Rhythm: Normal rate and regular rhythm.      Pulses: Normal pulses.      Heart sounds: Normal heart sounds.   Pulmonary:      Effort: Pulmonary effort is normal.      Breath sounds: Normal breath sounds.   Abdominal:      General: Bowel sounds are normal.      Palpations: Abdomen is soft.   Genitourinary:     Comments: deferred  Musculoskeletal:      Cervical back: Neck supple.   Skin:     General: Skin is warm and dry.   Neurological:      Mental Status: He is alert and oriented to person, place, and time.   Psychiatric:         Mood and Affect: Mood normal.         Behavior: Behavior normal.         Thought Content: Thought content normal.         Judgment: Judgment normal.       Lab Results   Component Value Date    HGBA1C 8.5 (A) 2024            HEALTH RISK ASSESSMENT    Smoking Status:  Social History     Tobacco Use   Smoking Status Former    Current packs/day: 0.00    Average packs/day: 1 pack/day for 26.0 years (26.0 ttl pk-yrs)    Types: Cigarettes    Start date: 1973    Quit date: 1999    Years since quittin.4   Smokeless Tobacco Never     Alcohol Consumption:  Social History     Substance and Sexual Activity   Alcohol Use Not Currently     Fall Risk Screen:    STEADI Fall Risk Assessment was completed, and patient is at LOW risk for falls.Assessment completed on:2024    Depression Screenin/21/2024     1:30 PM   PHQ-2/PHQ-9 Depression Screening   Little Interest or Pleasure in Doing Things 0-->not at all   Feeling Down, Depressed or Hopeless 0-->not at all   PHQ-9: Brief Depression Severity Measure Score 0       Health Habits and Functional and Cognitive Screenin/21/2024     1:30 PM   Functional & Cognitive Status   Do you have difficulty preparing food and eating?  No   Do you have difficulty bathing yourself, getting dressed or grooming yourself? No   Do you have difficulty using the toilet? No   Do you have difficulty moving around from place to place? No   Do you have trouble with steps or getting out of a bed or a chair? No   Current Diet Well Balanced Diet   Dental Exam Up to date   Eye Exam Not up to date   Exercise (times per week) 0 times per week   Current Exercises Include No Regular Exercise   Do you need help using the phone?  No   Are you deaf or do you have serious difficulty hearing?  No   Do you need help to go to places out of walking distance? No   Do you need help shopping? No   Do you need help preparing meals?  No   Do you need help with housework?  No   Do you need help with laundry? No   Do you need help taking your medications? No   Do you need help managing money? No   Do you ever drive or ride in a car without wearing a seat belt? No   Have you felt unusual stress, anger or loneliness in the last month? No   Who do you live with? Alone   If you need help, do you have trouble finding someone available to you? No   Have you been bothered in the last four weeks by sexual problems? No   Do you have difficulty concentrating, remembering or making decisions? No       Age-appropriate Screening Schedule:  Refer to the list below for future screening recommendations based on patient's age, sex and/or medical conditions. Orders for these recommended tests are listed in the plan section. The patient has been provided with a written plan.    Health Maintenance   Topic Date Due    ZOSTER VACCINE (2 of 2) 08/31/2023    ANNUAL WELLNESS VISIT  08/17/2024    COVID-19 Vaccine (1 - 2023-24 season) 05/23/2024 (Originally 9/1/2023)    RSV Vaccine - Adults (1 - 1-dose 60+ series) 02/20/2025 (Originally 8/26/2011)    Pneumococcal Vaccine 65+ (1 of 2 - PCV) 02/20/2025 (Originally 8/26/1956)    Hepatitis B (1 of 3 - Risk 3-dose series) 05/21/2025 (Originally 8/26/2011)     INFLUENZA VACCINE  08/01/2024    DIABETIC FOOT EXAM  08/17/2024    LIPID PANEL  08/17/2024    URINE MICROALBUMIN  08/17/2024    HEMOGLOBIN A1C  11/21/2024    DIABETIC EYE EXAM  12/09/2024    BMI FOLLOWUP  05/21/2025    COLORECTAL CANCER SCREENING  08/28/2026    TDAP/TD VACCINES (2 - Td or Tdap) 05/21/2034    HEPATITIS C SCREENING  Completed              Assessment & Plan   CMS Preventative Services Quick Reference  Risk Factors Identified During Encounter  Fall Risk-High or Moderate:  Patient just completed PT  The above risks/problems have been discussed with the patient.  Follow up actions/plans if indicated are seen below in the Assessment/Plan Section.  Pertinent information has been shared with the patient in the After Visit Summary.    Diagnoses and all orders for this visit:    1. Controlled type 2 diabetes mellitus with hyperglycemia, with long-term current use of insulin (Primary)  Assessment & Plan:  Home glucose readings reviewed. He presents averages of 130,134,150,157 and 158. A1C in office today is 8.5.  Patient currently utilizing Rybelsus 7 mg daily, glipizide 10 mg daily and Lantus 80 units nightly.  Will increase his Rybelsus to 14 mg daily and continue to monitor    Orders:  -     POC Glycosylated Hemoglobin (Hb A1C)  -     Semaglutide (Rybelsus) 14 MG tablet; Take 1 tablet by mouth Daily.  Dispense: 90 tablet; Refill: 1    2. Encounter for immunization  -     Tdap Vaccine Greater Than or Equal To 8yo IM    3. Coronary artery disease involving native coronary artery of native heart without angina pectoris  Assessment & Plan:  [facility] from 2/11/2023 revealed chronic total occlusion to the LAD extending from the ostium to the distal vessel and fed by collaterals from the RCA.  Mild nonobstructive CAD involving the proximal circumflex and proximal to mid RCA.   Patient denies chest pain, worsening shortness of breath or fatigue.  Patient denies any current issues with chest pain, worsening shortness of  breath or fatigue.  Repeat lipid panel when he returns fasting.  Continue aspirin, atorvastatin, metoprolol and amlodipine at current doses.      4. Mixed hyperlipidemia  Assessment & Plan:   Patient currently utilizing atorvastatin 40 mg and fenofibrate 160 mg.  Rechecking lipid      5. History of smoking  Assessment & Plan:  Patient 1 pack/day smoker for 20 years, stopped in 1999      6. Spinal stenosis, lumbar region, with neurogenic claudication  Assessment & Plan:  Patient has been evaluated by neurosurgery and now referred to pain management. Patient reports they would like to avoid surgery due to his weight and uncontrolled A1c. Per neurosurgery, based off of his MRI he does have some advanced level degenerative changes in the L3-4 facets as well as the interspinous bursa looks like it probably has a cyst in it at the L3-4 area. This is likely the cause of a lot of his low back pain when he is laying down at night. Patient does have pretty predictable pain whenever he is walking that radiates down the lateral aspect of his thigh. Patient also has diabetic peripheral neuropathy in the left foot and has chronic burning in his left foot he notes excellent benefit from epidural injections that he received thus far.  Patient also feels that physical therapy helped with his pain and mobility issues.       7. Stage 3a chronic kidney disease  Assessment & Plan:  Now seeing nephrology associatess with GFR 55 on last check.  As a result of decreased kidney function he has been taken off metformin by his nephrologist in [place] before relocating.  At that time his A1c has been suboptimally controlled.  Today we discussed need for tight glycemic and blood pressure control to preserve renal function.  I discussed avoidance of nephrotoxic agents including NSAIDs.  He tells me there is no way that he can completely give up NSAIDs due to chronic back pain issues.  Checking CMP today      8. Primary hypertension  Assessment &  Plan:  Patient's blood pressure very well-controlled in office today, 112/74.  Currently utilizing regimen of amlodipine 10 mg daily, HCTZ 25 mg daily, metoprolol 100 mg twice daily      9. Primary insomnia  Assessment & Plan:  Longstanding pattern of insomnia, he is utilizing lorazepam nightly with good benefit for a number of years.  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  He has experienced poor response in the past to medication such as trazodone, Ambien and Lunesta.  Reviewed Jack with satisfactory findings.  He has controlled substance agreement and up-to-date UDS on file       10. Benign prostatic hyperplasia with weak urinary stream  Assessment & Plan:  Patient has complaints today of frequent nighttime urination, approximately 3-4 episodes.  He also notes weekend urinary stream and urgency.  He denies any feelings of urinary retention, hematuria or urinary obstruction.  Will initiate tamsulosin 0.4 mg nightly and continue to monitor    Orders:  -     tamsulosin (FLOMAX) 0.4 MG capsule 24 hr capsule; Take 1 capsule by mouth Daily.  Dispense: 90 capsule; Refill: 1    11. Anxiety  -     LORazepam (ATIVAN) 1 MG tablet; Take 1 tablet by mouth Every 6 (Six) Hours As Needed for Anxiety.  Dispense: 90 tablet; Refill: 0    12. Obstructive sleep apnea  Assessment & Plan:  She reports better compliance since getting a new, smaller PAP mask.  Followed at Dr. Naqvi's office.      13. Morbid (severe) obesity due to excess calories  Assessment & Plan:  Patient's (Body mass index is 41.9 kg/m².) indicates that they are morbidly/severely obese (BMI > 40 or > 35 with obesity - related health condition) with health conditions that include obstructive sleep apnea, hypertension, coronary heart disease, diabetes mellitus, dyslipidemias, and osteoarthritis . Weight is unchanged. BMI  is above average; BMI management  plan is completed. We discussed portion control and increasing exercise.       14. Type 2 diabetes mellitus with hyperglycemia, with long-term current use of insulin  Assessment & Plan:  Home glucose readings reviewed. He presents averages of 130,134,150,157 and 158. A1C in office today is 8.5.  Patient currently utilizing Rybelsus 7 mg daily, glipizide 10 mg daily and Lantus 80 units nightly.  Will increase his Rybelsus to 14 mg daily and continue to monitor          Follow Up:   Return in about 3 months (around 8/21/2024) for Annual physical.     An After Visit Summary and PPPS were made available to the patient.

## 2024-05-23 PROBLEM — N40.1 BENIGN PROSTATIC HYPERPLASIA WITH WEAK URINARY STREAM: Status: ACTIVE | Noted: 2024-05-23

## 2024-05-23 PROBLEM — R39.12 BENIGN PROSTATIC HYPERPLASIA WITH WEAK URINARY STREAM: Status: ACTIVE | Noted: 2024-05-23

## 2024-05-23 NOTE — ASSESSMENT & PLAN NOTE
[facility] from 2/11/2023 revealed chronic total occlusion to the LAD extending from the ostium to the distal vessel and fed by collaterals from the RCA.  Mild nonobstructive CAD involving the proximal circumflex and proximal to mid RCA.   Patient denies chest pain, worsening shortness of breath or fatigue.  Patient denies any current issues with chest pain, worsening shortness of breath or fatigue.  Repeat lipid panel when he returns fasting.  Continue aspirin, atorvastatin, metoprolol and amlodipine at current doses.

## 2024-05-23 NOTE — ASSESSMENT & PLAN NOTE
Now seeing nephrology associatess with GFR 55 on last check.  As a result of decreased kidney function he has been taken off metformin by his nephrologist in [place] before relocating.  At that time his A1c has been suboptimally controlled.  Today we discussed need for tight glycemic and blood pressure control to preserve renal function.  I discussed avoidance of nephrotoxic agents including NSAIDs.  He tells me there is no way that he can completely give up NSAIDs due to chronic back pain issues.  Checking CMP today

## 2024-05-23 NOTE — ASSESSMENT & PLAN NOTE
She reports better compliance since getting a new, smaller PAP mask.  Followed at Dr. Naqvi's office.

## 2024-05-23 NOTE — ASSESSMENT & PLAN NOTE
Patient's blood pressure very well-controlled in office today, 112/74.  Currently utilizing regimen of amlodipine 10 mg daily, HCTZ 25 mg daily, metoprolol 100 mg twice daily

## 2024-05-23 NOTE — ASSESSMENT & PLAN NOTE
Patient's (Body mass index is 41.9 kg/m².) indicates that they are morbidly/severely obese (BMI > 40 or > 35 with obesity - related health condition) with health conditions that include obstructive sleep apnea, hypertension, coronary heart disease, diabetes mellitus, dyslipidemias, and osteoarthritis . Weight is unchanged. BMI  is above average; BMI management plan is completed. We discussed portion control and increasing exercise.

## 2024-05-23 NOTE — ASSESSMENT & PLAN NOTE
Patient has complaints today of frequent nighttime urination, approximately 3-4 episodes.  He also notes weekend urinary stream and urgency.  He denies any feelings of urinary retention, hematuria or urinary obstruction.  Will initiate tamsulosin 0.4 mg nightly and continue to monitor

## 2024-05-23 NOTE — ASSESSMENT & PLAN NOTE
Longstanding pattern of insomnia, he is utilizing lorazepam nightly with good benefit for a number of years.  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  He has experienced poor response in the past to medication such as trazodone, Ambien and Lunesta.  Reviewed Jack with satisfactory findings.  He has controlled substance agreement and up-to-date UDS on file

## 2024-05-23 NOTE — ASSESSMENT & PLAN NOTE
Patient has been evaluated by neurosurgery and now referred to pain management. Patient reports they would like to avoid surgery due to his weight and uncontrolled A1c. Per neurosurgery, based off of his MRI he does have some advanced level degenerative changes in the L3-4 facets as well as the interspinous bursa looks like it probably has a cyst in it at the L3-4 area. This is likely the cause of a lot of his low back pain when he is laying down at night. Patient does have pretty predictable pain whenever he is walking that radiates down the lateral aspect of his thigh. Patient also has diabetic peripheral neuropathy in the left foot and has chronic burning in his left foot he notes excellent benefit from epidural injections that he received thus far.  Patient also feels that physical therapy helped with his pain and mobility issues.

## 2024-05-31 ENCOUNTER — OFFICE VISIT (OUTPATIENT)
Dept: CARDIOLOGY | Facility: CLINIC | Age: 74
End: 2024-05-31
Payer: MEDICARE

## 2024-05-31 VITALS
WEIGHT: 290 LBS | HEIGHT: 70 IN | DIASTOLIC BLOOD PRESSURE: 80 MMHG | HEART RATE: 74 BPM | SYSTOLIC BLOOD PRESSURE: 126 MMHG | OXYGEN SATURATION: 95 % | BODY MASS INDEX: 41.52 KG/M2

## 2024-05-31 DIAGNOSIS — I35.0 AORTIC STENOSIS, MILD: ICD-10-CM

## 2024-05-31 DIAGNOSIS — G47.33 OBSTRUCTIVE SLEEP APNEA: ICD-10-CM

## 2024-05-31 PROBLEM — I25.10 CORONARY ARTERY DISEASE INVOLVING NATIVE CORONARY ARTERY OF NATIVE HEART WITHOUT ANGINA PECTORIS: Chronic | Status: ACTIVE | Noted: 2022-11-01

## 2024-05-31 PROBLEM — M25.551 HIP PAIN, RIGHT: Status: ACTIVE | Noted: 2018-12-18

## 2024-05-31 PROBLEM — I38 VALVULAR HEART DISEASE: Chronic | Status: ACTIVE | Noted: 2022-11-01

## 2024-05-31 PROBLEM — K21.9 GERD (GASTROESOPHAGEAL REFLUX DISEASE): Status: ACTIVE | Noted: 2018-12-18

## 2024-05-31 PROBLEM — M16.9 OSTEOARTHRITIS OF HIP: Status: ACTIVE | Noted: 2018-12-17

## 2024-05-31 PROBLEM — S22.42XA MULTIPLE CLOSED FRACTURES OF RIBS OF LEFT SIDE: Status: RESOLVED | Noted: 2022-09-08 | Resolved: 2024-05-31

## 2024-05-31 PROBLEM — R79.89 TROPONIN LEVEL ELEVATED: Status: RESOLVED | Noted: 2022-11-01 | Resolved: 2024-05-31

## 2024-05-31 PROBLEM — N18.9 CKD (CHRONIC KIDNEY DISEASE): Chronic | Status: ACTIVE | Noted: 2023-02-23

## 2024-05-31 PROCEDURE — 3079F DIAST BP 80-89 MM HG: CPT | Performed by: NURSE PRACTITIONER

## 2024-05-31 PROCEDURE — 3074F SYST BP LT 130 MM HG: CPT | Performed by: NURSE PRACTITIONER

## 2024-05-31 PROCEDURE — 99213 OFFICE O/P EST LOW 20 MIN: CPT | Performed by: NURSE PRACTITIONER

## 2024-05-31 NOTE — PROGRESS NOTES
[facility] Provider Note     Date:   2024   Name: Jose A Villanueva  :   1950  PCP: Radha Asher APRN    Chief Complaint   Patient presents with    Sleep Apnea     31-90 day       Subjective     History of Present Illness  Jose A Villanueva is a 73 y.o. male who presents today for 31 to 90-day PIPPA follow-up after getting a new replacement machine.  He is not new to PAP therapy but we had ordered a new machine because his was very old and he had to keep a piece of cardboard between the humidity tray and the machine to keep it from making so much noise.    Today he reports his new machine is very quiet and working well.  He said machine, airflow, mask are comfortable.  PAP compliance report dated 2024 to 2024 download interpreted in clinic today.  Patient uses [facility] in [place].  Shown patient uses his machine 100% of the time, over 4 hours 97% of the time, for an average use of 6 hours and 31 minutes.  His AirSense 10 AutoSet is set 4-10 with an EPR 3.  His overall AHI is 7.9 showing room for improvement with control but excellent compliance.  He is frequently maxed out at his upper pressure of 10.  However, he does not want to increase his pressures at this time.    His blood pressure is at goal.    Will update his echo at follow-up for mild aortic stenosis.    We will recheck in 6 months.  He normally sees Carla.    Cardiac/PIPPA history:    1.  PIPPA with unknown baseline AHI  Current treatment 5-15cm    Titration study 3/8/2024 patient had a successful CPAP titration to 8 cm.    2.  CAD  3.  Hypertension  4.  Mild aortic stenosis    [facility] 2023-  ·  Chronic total occlusion to the LAD extending from the ostium to the   distal vessel and fed by collaterals from the RCA   ·  Mild nonobstructive coronary artery disease involving the proximal LCx   and proximal to mid RCA.   ·  Mildly elevated LVEDP at 24 mmHg    Echocardiogram 10/25/2022-  LVEF 55%.  Abnormal grade 1  (mild/impaired relaxation) left ventricular diastolic dysfunction.  Mild concentric LVH.  RVSP is normal.  Mild aortic stenosis.  Peak gradient 33 mmHg.  Mean gradient 18 mmHg    Allergies   Allergen Reactions    Cerumenex [Trolamine (Triethanolamine)] Rash       Current Outpatient Medications:     amLODIPine (NORVASC) 10 MG tablet, TAKE 1 TABLET EVERY DAY, Disp: 90 tablet, Rfl: 3    aspirin 81 MG oral suspension, , Disp: , Rfl:     atorvastatin (LIPITOR) 40 MG tablet, TAKE 1 TABLET EVERY DAY, Disp: 90 tablet, Rfl: 3    Blood Glucose Monitoring Suppl (D-Care Glucometer) w/Device kit, 1 kit 3 (Three) Times a Day. Indications: may substitute for any glucometer kit that is approved and availabe, Disp: 1 kit, Rfl: 0    fenofibrate 160 MG tablet, Take 1 tablet by mouth Daily., Disp: , Rfl:     glipizide (GLUCOTROL) 10 MG tablet, TAKE 1 TABLET BY MOUTH 2 (TWO) TIMES A DAY BEFORE MEALS., Disp: 180 tablet, Rfl: 3    hydroCHLOROthiazide 25 MG tablet, TAKE 1 TABLET EVERY DAY, Disp: 90 tablet, Rfl: 3    Insulin Glargine (Lantus SoloStar) 100 UNIT/ML injection pen, INJECT 60 TO 80 UNITS UNDER THE SKIN ONE TIME DAILY AS DIRECTED (Patient taking differently: 80 Units Every Night. INJECT 60 TO 80 UNITS UNDER THE SKIN ONE TIME DAILY AS DIRECTED), Disp: 45 mL, Rfl: 3    LORazepam (ATIVAN) 1 MG tablet, Take 1 tablet by mouth Every 6 (Six) Hours As Needed for Anxiety., Disp: 90 tablet, Rfl: 0    metoprolol tartrate (LOPRESSOR) 100 MG tablet, Take 1 tablet by mouth Daily. (Patient taking differently: Take 1 tablet by mouth 2 (Two) Times a Day.), Disp: 30 tablet, Rfl: 2    nystatin (MYCOSTATIN) 551402 UNIT/GM cream, APPLY TOPICALLY TO THE APPROPRIATE AREA AS DIRECTED 2 (TWO) TIMES A DAY., Disp: 30 g, Rfl: 11    pantoprazole (PROTONIX) 40 MG EC tablet, Take 1 tablet by mouth Daily., Disp: , Rfl:     pregabalin (LYRICA) 150 MG capsule, Take 1 capsule by mouth Every 12 (Twelve) Hours., Disp: 60 capsule, Rfl: 0    Semaglutide (Rybelsus) 14  MG tablet, Take 1 tablet by mouth Daily., Disp: 90 tablet, Rfl: 1    sertraline (ZOLOFT) 50 MG tablet, TAKE 1 TABLET EVERY DAY, Disp: 90 tablet, Rfl: 3    tamsulosin (FLOMAX) 0.4 MG capsule 24 hr capsule, Take 1 capsule by mouth Daily., Disp: 90 capsule, Rfl: 1    tiZANidine (ZANAFLEX) 4 MG tablet, Take 1 tablet by mouth At Night As Needed for Muscle Spasms., Disp: 90 tablet, Rfl: 1    Past Medical History:   Diagnosis Date    Alcoholism     in remission    Anxiety     Arthritis     Asthma     sleep apnea/APAP    Cervical disc disorder     CKD (chronic kidney disease) 2023    Coronary artery disease involving native coronary artery of native heart without angina pectoris 2022    Depression     Diabetes mellitus     GERD (gastroesophageal reflux disease)     Hyperlipidemia     Hypertension     Low back pain     casual/ occasional    Multiple closed fractures of ribs of left side 2022    Peripheral neuropathy     Substance abuse     alcoholism    Thoracic disc disorder     Troponin level elevated 2022    Formatting of this note might be different from the original.   Added automatically from request for surgery [id number]        Past Surgical History:   Procedure Laterality Date    CHOLECYSTECTOMY      EPIDURAL BLOCK      by pain doctor    KNEE ARTHROSCOPY Bilateral     REPLACEMENT TOTAL HIP ONCOLOGIC Right     TONSILLECTOMY       Family History   Problem Relation Age of Onset    Arthritis Mother     Hypertension Mother     COPD Mother             Arthritis Father     Hypertension Father     Hyperlipidemia Father             Alcohol abuse Brother     Drug abuse Brother             Early death Brother         49 yo     Social History     Socioeconomic History    Marital status: Single   Tobacco Use    Smoking status: Former     Current packs/day: 0.00     Average packs/day: 1 pack/day for 26.0 years (26.0 ttl pk-yrs)     Types: Cigarettes      "Start date: 1973     Quit date: 1999     Years since quittin.4    Smokeless tobacco: Never   Vaping Use    Vaping status: Never Used   Substance and Sexual Activity    Alcohol use: Not Currently    Drug use: Yes    Sexual activity: Not Currently     Partners: Male     Birth control/protection: Condom, Same-sex partner       Objective     Vital Signs:  /80   Pulse 74   Ht 177.8 cm (70\")   Wt 132 kg (290 lb)   SpO2 95%   BMI 41.61 kg/m²   Estimated body mass index is 41.61 kg/m² as calculated from the following:    Height as of this encounter: 177.8 cm (70\").    Weight as of this encounter: 132 kg (290 lb).         Physical Exam  Vitals reviewed.   Constitutional:       Appearance: Normal appearance. He is well-developed.   HENT:      Head: Normocephalic and atraumatic.   Eyes:      General: No scleral icterus.     Pupils: Pupils are equal, round, and reactive to light.   Neck:      Vascular: No carotid bruit.   Cardiovascular:      Rate and Rhythm: Normal rate and regular rhythm.      Pulses: Normal pulses.           Radial pulses are 2+ on the right side and 2+ on the left side.        Dorsalis pedis pulses are 2+ on the right side and 2+ on the left side.        Posterior tibial pulses are 2+ on the right side and 2+ on the left side.      Heart sounds: Normal heart sounds. No murmur heard.  Pulmonary:      Breath sounds: Normal breath sounds. No wheezing or rhonchi.   Musculoskeletal:         General: Normal range of motion.      Right lower leg: No edema.      Left lower leg: No edema.   Skin:     General: Skin is warm and dry.      Capillary Refill: Capillary refill takes less than 2 seconds.      Coloration: Skin is not cyanotic.      Nails: There is no clubbing.   Neurological:      Mental Status: He is alert and oriented to person, place, and time.      Motor: No weakness.      Gait: Gait normal.   Psychiatric:         Mood and Affect: Mood normal.         Behavior: Behavior is " cooperative.         Thought Content: Thought content normal.         Cognition and Memory: Memory normal.                     Assessment and Plan     Diagnoses and all orders for this visit:    1. Aortic stenosis, mild  Comments:  Update echo at follow-up.  Orders:  -     Adult Transthoracic Echo Complete W/ Cont if Necessary Per Protocol; Future    2. Obstructive sleep apnea  Comments:  Benefiting from PAP therapy.  Plan to continue.  Patient declines increasing pressures at this time.        Recommendations: ER if symptoms increase and Report if any new/changing symptoms immediately      Follow Up  Return in about 6 months (around 11/30/2024) for with echo.    Nanda Torres, APRN   05/31/2024     Please note that this explicitly excludes time spent on other separate billable services such as performing procedures or test interpretation, when applicable.    This note was created using dictation software which occasionally transcribes nonsensical phrases. Please contact the provider if any clarification is needed.

## 2024-06-05 DIAGNOSIS — E11.43 TYPE 2 DIABETES MELLITUS WITH DIABETIC AUTONOMIC NEUROPATHY, WITHOUT LONG-TERM CURRENT USE OF INSULIN: ICD-10-CM

## 2024-06-05 RX ORDER — PREGABALIN 150 MG/1
150 CAPSULE ORAL EVERY 12 HOURS
Qty: 60 CAPSULE | Refills: 0 | Status: SHIPPED | OUTPATIENT
Start: 2024-06-05

## 2024-06-05 NOTE — TELEPHONE ENCOUNTER
Caller: Jose A Villanueva    Relationship: Self    Best call back number: [phone]     Requested Prescriptions:   Requested Prescriptions     Pending Prescriptions Disp Refills    pregabalin (LYRICA) 150 MG capsule 60 capsule 0     Sig: Take 1 capsule by mouth Every 12 (Twelve) Hours.        Pharmacy where request should be sent: [facility] #[id number] - [facility] [place] - [address] AT [address] [phone] [facility] [phone]      Last office visit with prescribing clinician: 5/21/2024   Last telemedicine visit with prescribing clinician: Visit date not found   Next office visit with prescribing clinician: 8/22/2024     Additional details provided by patient: HAS 3 DAYS REMAINING AND IS REQUESTING THIS MEDICATION TO BE SENT TO [facility] IN [place]     Does the patient have less than a 3 day supply:  [] Yes  [x] No    Would you like a call back once the refill request has been completed: [] Yes [x] No    If the office needs to give you a call back, can they leave a voicemail: [] Yes [x] No    Wendy Meng Rep   06/05/24 11:56 EDT

## 2024-06-17 RX ORDER — TIZANIDINE 4 MG/1
TABLET ORAL
Qty: 90 TABLET | Refills: 3 | Status: SHIPPED | OUTPATIENT
Start: 2024-06-17

## 2024-06-20 ENCOUNTER — CLINICAL SUPPORT (OUTPATIENT)
Dept: FAMILY MEDICINE CLINIC | Facility: CLINIC | Age: 74
End: 2024-06-20
Payer: MEDICARE

## 2024-06-20 DIAGNOSIS — E11.65 TYPE 2 DIABETES MELLITUS WITH HYPERGLYCEMIA, WITH LONG-TERM CURRENT USE OF INSULIN: Primary | ICD-10-CM

## 2024-06-20 DIAGNOSIS — Z00.00 INITIAL MEDICARE ANNUAL WELLNESS VISIT: ICD-10-CM

## 2024-06-20 DIAGNOSIS — Z79.4 TYPE 2 DIABETES MELLITUS WITH HYPERGLYCEMIA, WITH LONG-TERM CURRENT USE OF INSULIN: Primary | ICD-10-CM

## 2024-06-20 DIAGNOSIS — E78.2 MIXED HYPERLIPIDEMIA: ICD-10-CM

## 2024-06-20 PROCEDURE — 36415 COLL VENOUS BLD VENIPUNCTURE: CPT | Performed by: NURSE PRACTITIONER

## 2024-06-20 NOTE — PROGRESS NOTES
Venipuncture Blood Specimen Collection  Venipuncture performed in left arm by Letha Thompson MA with good hemostasis. Patient tolerated the procedure well without complications.   06/20/24   Letha Thompson MA

## 2024-06-21 LAB
ALBUMIN SERPL-MCNC: 4.5 G/DL (ref 3.8–4.8)
ALP SERPL-CCNC: 44 IU/L (ref 44–121)
ALT SERPL-CCNC: 23 IU/L (ref 0–44)
AST SERPL-CCNC: 23 IU/L (ref 0–40)
BASOPHILS # BLD AUTO: 0.1 X10E3/UL (ref 0–0.2)
BASOPHILS NFR BLD AUTO: 1 %
BILIRUB SERPL-MCNC: 0.7 MG/DL (ref 0–1.2)
BUN SERPL-MCNC: 25 MG/DL (ref 8–27)
BUN/CREAT SERPL: 16 (ref 10–24)
CALCIUM SERPL-MCNC: 9.5 MG/DL (ref 8.6–10.2)
CHLORIDE SERPL-SCNC: 100 MMOL/L (ref 96–106)
CHOLEST SERPL-MCNC: 131 MG/DL (ref 100–199)
CO2 SERPL-SCNC: 27 MMOL/L (ref 20–29)
CREAT SERPL-MCNC: 1.55 MG/DL (ref 0.76–1.27)
EGFRCR SERPLBLD CKD-EPI 2021: 47 ML/MIN/1.73
EOSINOPHIL # BLD AUTO: 0.4 X10E3/UL (ref 0–0.4)
EOSINOPHIL NFR BLD AUTO: 4 %
ERYTHROCYTE [DISTWIDTH] IN BLOOD BY AUTOMATED COUNT: 12.3 % (ref 11.6–15.4)
GLOBULIN SER CALC-MCNC: 2.2 G/DL (ref 1.5–4.5)
GLUCOSE SERPL-MCNC: 72 MG/DL (ref 70–99)
HCT VFR BLD AUTO: 41.8 % (ref 37.5–51)
HDLC SERPL-MCNC: 29 MG/DL
HGB BLD-MCNC: 14.2 G/DL (ref 13–17.7)
IMM GRANULOCYTES # BLD AUTO: 0 X10E3/UL (ref 0–0.1)
IMM GRANULOCYTES NFR BLD AUTO: 0 %
LDLC SERPL CALC-MCNC: 68 MG/DL (ref 0–99)
LYMPHOCYTES # BLD AUTO: 4.2 X10E3/UL (ref 0.7–3.1)
LYMPHOCYTES NFR BLD AUTO: 46 %
MCH RBC QN AUTO: 31.3 PG (ref 26.6–33)
MCHC RBC AUTO-ENTMCNC: 34 G/DL (ref 31.5–35.7)
MCV RBC AUTO: 92 FL (ref 79–97)
MONOCYTES # BLD AUTO: 1.2 X10E3/UL (ref 0.1–0.9)
MONOCYTES NFR BLD AUTO: 14 %
NEUTROPHILS # BLD AUTO: 3.1 X10E3/UL (ref 1.4–7)
NEUTROPHILS NFR BLD AUTO: 35 %
PLATELET # BLD AUTO: 188 X10E3/UL (ref 150–450)
POTASSIUM SERPL-SCNC: 4 MMOL/L (ref 3.5–5.2)
PROT SERPL-MCNC: 6.7 G/DL (ref 6–8.5)
RBC # BLD AUTO: 4.53 X10E6/UL (ref 4.14–5.8)
SODIUM SERPL-SCNC: 142 MMOL/L (ref 134–144)
TRIGL SERPL-MCNC: 203 MG/DL (ref 0–149)
TSH SERPL DL<=0.005 MIU/L-ACNC: 3.45 UIU/ML (ref 0.45–4.5)
VLDLC SERPL CALC-MCNC: 34 MG/DL (ref 5–40)
WBC # BLD AUTO: 9 X10E3/UL (ref 3.4–10.8)

## 2024-06-24 ENCOUNTER — TELEPHONE (OUTPATIENT)
Dept: CARDIOLOGY | Facility: CLINIC | Age: 74
End: 2024-06-24
Payer: MEDICARE

## 2024-06-24 DIAGNOSIS — F41.9 ANXIETY: ICD-10-CM

## 2024-06-24 DIAGNOSIS — E11.43 TYPE 2 DIABETES MELLITUS WITH DIABETIC AUTONOMIC NEUROPATHY, WITHOUT LONG-TERM CURRENT USE OF INSULIN: ICD-10-CM

## 2024-06-24 RX ORDER — PREGABALIN 150 MG/1
150 CAPSULE ORAL EVERY 12 HOURS
Qty: 60 CAPSULE | Refills: 0 | Status: SHIPPED | OUTPATIENT
Start: 2024-06-24

## 2024-06-24 RX ORDER — LORAZEPAM 1 MG/1
1 TABLET ORAL EVERY 6 HOURS PRN
Qty: 90 TABLET | Refills: 0 | Status: SHIPPED | OUTPATIENT
Start: 2024-06-24

## 2024-06-24 NOTE — TELEPHONE ENCOUNTER
Called and left message  with patient and advised him that there is not a program to help, he can reach out to [facility] to see if they have an assistant program

## 2024-06-24 NOTE — TELEPHONE ENCOUNTER
Caller: Jose A Villanueva    Relationship: Self    Best call back number: [phone]    Requested Prescriptions:   Requested Prescriptions     Pending Prescriptions Disp Refills    LORazepam (ATIVAN) 1 MG tablet 90 tablet 0     Sig: Take 1 tablet by mouth Every 6 (Six) Hours As Needed for Anxiety.    pregabalin (LYRICA) 150 MG capsule 60 capsule 0     Sig: Take 1 capsule by mouth Every 12 (Twelve) Hours.        Pharmacy where request should be sent:    [facility] [phone]  Last office visit with prescribing clinician: 5/21/2024   Last telemedicine visit with prescribing clinician: Visit date not found   Next office visit with prescribing clinician: 8/22/2024     Additional details provided by patient: PATIENT HAS 2 DAYS LEFT OF LORAZEPAM AND 4-5 DAYS LEFT OF LYRICA. PLEASE ADVISE    Does the patient have less than a 3 day supply:  [x] Yes  [] No    Would you like a call back once the refill request has been completed: [] Yes [x] No    If the office needs to give you a call back, can they leave a voicemail: [] Yes [x] No    Wendy Saba Rep   06/24/24 11:17 EDT

## 2024-06-24 NOTE — TELEPHONE ENCOUNTER
Caller: Jose A Villanueva    Relationship: Self    Best call back number: [phone]    Which medication are you concerned about: DARIUSZ    Who prescribed you this medication: MICKEY MATIAS    What are your concerns: THIS MEDICATION IS TOO EXPENSIVE, IS THERE A PROGRAM TO HELP PAY FOR MEDICATION? PLEASE ADVISE

## 2024-06-24 NOTE — TELEPHONE ENCOUNTER
I have corrected this and put his follow up after his echo on 12/12 here in [place].  LVM for patient that described what I had done so that way he isn't making 2 trips to [place] back to back.  Thanks  ----- Message from Rishabh LAW sent at 6/24/2024  8:35 AM EDT -----  Regarding: FW: Schedule  Contact: [phone]        ----- Message -----  From: Jose A Villanueva  Sent: 6/22/2024  11:01 AM EDT  To: Amelia Song [facility]  Subject: Schedule                                         I thought you said I would see you after ECHO on Thursday.    Not have a second appointment.     I prefer to see you another day if needed.   Jose A

## 2024-07-10 DIAGNOSIS — F41.9 ANXIETY: ICD-10-CM

## 2024-07-10 DIAGNOSIS — Z79.4 CONTROLLED TYPE 2 DIABETES MELLITUS WITH HYPERGLYCEMIA, WITH LONG-TERM CURRENT USE OF INSULIN: ICD-10-CM

## 2024-07-10 DIAGNOSIS — E11.65 CONTROLLED TYPE 2 DIABETES MELLITUS WITH HYPERGLYCEMIA, WITH LONG-TERM CURRENT USE OF INSULIN: ICD-10-CM

## 2024-07-10 RX ORDER — ORAL SEMAGLUTIDE 14 MG/1
14 TABLET ORAL DAILY
Qty: 90 TABLET | Refills: 1 | Status: SHIPPED | OUTPATIENT
Start: 2024-07-10

## 2024-07-10 RX ORDER — LORAZEPAM 1 MG/1
1 TABLET ORAL EVERY 6 HOURS PRN
Qty: 90 TABLET | Refills: 0 | Status: SHIPPED | OUTPATIENT
Start: 2024-07-10

## 2024-07-10 NOTE — TELEPHONE ENCOUNTER
Caller: Jose A Villanueva    Relationship: Self    Best call back number: [phone]     Requested Prescriptions:   Requested Prescriptions     Pending Prescriptions Disp Refills    LORazepam (ATIVAN) 1 MG tablet 90 tablet 0     Sig: Take 1 tablet by mouth Every 6 (Six) Hours As Needed for Anxiety.    Semaglutide (Rybelsus) 14 MG tablet 90 tablet 1     Sig: Take 1 tablet by mouth Daily.        Pharmacy where request should be sent: [id number] [address] [place] [address] AT [address] [phone] [facility] [phone]      Last office visit with prescribing clinician: 5/21/2024   Last telemedicine visit with prescribing clinician: Visit date not found   Next office visit with prescribing clinician: 8/22/2024     Additional details provided by patient: 2 days left    Does the patient have less than a 3 day supply:  [x] Yes  [] No    Would you like a call back once the refill request has been completed: [] Yes [x] No    If the office needs to give you a call back, can they leave a voicemail: [] Yes [x] No    Wendy Gupta   07/10/24 13:08 EDT

## 2024-08-05 DIAGNOSIS — E11.43 TYPE 2 DIABETES MELLITUS WITH DIABETIC AUTONOMIC NEUROPATHY, WITHOUT LONG-TERM CURRENT USE OF INSULIN: ICD-10-CM

## 2024-08-06 RX ORDER — PREGABALIN 150 MG/1
150 CAPSULE ORAL EVERY 12 HOURS
Qty: 60 CAPSULE | OUTPATIENT
Start: 2024-08-06

## 2024-08-06 NOTE — TELEPHONE ENCOUNTER
Caller: Jose A Villanueva    Relationship: Self    Best call back number: [phone]    Requested Prescriptions:   Requested Prescriptions     Pending Prescriptions Disp Refills    pregabalin (LYRICA) 150 MG capsule [Pharmacy Med Name: PREGABALIN 150MG CAPSULES] 60 capsule      Sig: TAKE 1 CAPSULE BY MOUTH EVERY 12 HOURS        Pharmacy where request should be sent: [facility] #[id number] - [place], [place] - [address] AT [address] [phone] [facility] [phone]      Last office visit with prescribing clinician: 5/21/2024   Last telemedicine visit with prescribing clinician: Visit date not found   Next office visit with prescribing clinician: 8/22/2024     Additional details provided by patient:     Does the patient have less than a 3 day supply:  [x] Yes  [] No    Would you like a call back once the refill request has been completed: [] Yes [x] No    If the office needs to give you a call back, can they leave a voicemail: [] Yes [x] No    Wendy Maciel   08/06/24 09:25 EDT

## 2024-08-07 DIAGNOSIS — E11.43 TYPE 2 DIABETES MELLITUS WITH DIABETIC AUTONOMIC NEUROPATHY, WITHOUT LONG-TERM CURRENT USE OF INSULIN: ICD-10-CM

## 2024-08-07 RX ORDER — PREGABALIN 150 MG/1
150 CAPSULE ORAL EVERY 12 HOURS
Qty: 60 CAPSULE | Refills: 0 | Status: SHIPPED | OUTPATIENT
Start: 2024-08-07

## 2024-08-22 ENCOUNTER — OFFICE VISIT (OUTPATIENT)
Dept: FAMILY MEDICINE CLINIC | Facility: CLINIC | Age: 74
End: 2024-08-22
Payer: MEDICARE

## 2024-08-22 VITALS
WEIGHT: 292 LBS | BODY MASS INDEX: 41.8 KG/M2 | SYSTOLIC BLOOD PRESSURE: 130 MMHG | OXYGEN SATURATION: 96 % | TEMPERATURE: 97.9 F | HEIGHT: 70 IN | HEART RATE: 75 BPM | DIASTOLIC BLOOD PRESSURE: 82 MMHG

## 2024-08-22 DIAGNOSIS — K21.9 GASTROESOPHAGEAL REFLUX DISEASE WITHOUT ESOPHAGITIS: ICD-10-CM

## 2024-08-22 DIAGNOSIS — I25.10 CORONARY ARTERY DISEASE INVOLVING NATIVE CORONARY ARTERY OF NATIVE HEART WITHOUT ANGINA PECTORIS: Chronic | ICD-10-CM

## 2024-08-22 DIAGNOSIS — I10 PRIMARY HYPERTENSION: ICD-10-CM

## 2024-08-22 DIAGNOSIS — N18.31 STAGE 3A CHRONIC KIDNEY DISEASE: Primary | ICD-10-CM

## 2024-08-22 DIAGNOSIS — N40.1 BENIGN PROSTATIC HYPERPLASIA WITH WEAK URINARY STREAM: ICD-10-CM

## 2024-08-22 DIAGNOSIS — Z79.4 TYPE 2 DIABETES MELLITUS WITH HYPERGLYCEMIA, WITH LONG-TERM CURRENT USE OF INSULIN: ICD-10-CM

## 2024-08-22 DIAGNOSIS — E11.65 TYPE 2 DIABETES MELLITUS WITH HYPERGLYCEMIA, WITH LONG-TERM CURRENT USE OF INSULIN: ICD-10-CM

## 2024-08-22 DIAGNOSIS — F51.01 PRIMARY INSOMNIA: ICD-10-CM

## 2024-08-22 DIAGNOSIS — R39.12 BENIGN PROSTATIC HYPERPLASIA WITH WEAK URINARY STREAM: ICD-10-CM

## 2024-08-22 DIAGNOSIS — E78.2 MIXED HYPERLIPIDEMIA: ICD-10-CM

## 2024-08-22 DIAGNOSIS — G47.33 OBSTRUCTIVE SLEEP APNEA: ICD-10-CM

## 2024-08-22 DIAGNOSIS — E66.01 MORBID (SEVERE) OBESITY DUE TO EXCESS CALORIES: ICD-10-CM

## 2024-08-22 DIAGNOSIS — M48.062 SPINAL STENOSIS, LUMBAR REGION, WITH NEUROGENIC CLAUDICATION: ICD-10-CM

## 2024-08-22 PROBLEM — N18.9 CKD (CHRONIC KIDNEY DISEASE): Chronic | Status: RESOLVED | Noted: 2023-02-23 | Resolved: 2024-08-22

## 2024-08-22 LAB
EXPIRATION DATE: ABNORMAL
HBA1C MFR BLD: 9.2 % (ref 4.5–5.7)
Lab: ABNORMAL

## 2024-08-22 RX ORDER — OLOPATADINE HYDROCHLORIDE 1 MG/ML
1 SOLUTION/ DROPS OPHTHALMIC 2 TIMES DAILY
Qty: 5 ML | Refills: 6 | Status: SHIPPED | OUTPATIENT
Start: 2024-08-22

## 2024-08-22 RX ORDER — PROCHLORPERAZINE 25 MG/1
1 SUPPOSITORY RECTAL ONCE
Qty: 1 EACH | Refills: 0 | Status: SHIPPED | OUTPATIENT
Start: 2024-08-22 | End: 2024-08-22

## 2024-08-22 RX ORDER — PROCHLORPERAZINE 25 MG/1
SUPPOSITORY RECTAL
Qty: 3 EACH | Refills: 2 | Status: SHIPPED | OUTPATIENT
Start: 2024-08-22

## 2024-08-22 NOTE — PROGRESS NOTES
Office Note     Name: Jose A Villanueva    : 1950     MRN: [id number]     Chief Complaint  Annual Exam    Subjective     History of Present Illness:  Jose A Villanueva is a 73 y.o. male who presents today for annual physical exam.  Follow-up for chronic conditions including BPH, coronary artery disease, GERD, hyperlipidemia, obesity, PIPPA, hypertension, insomnia, lumbar spinal stenosis, CKD stage III and type 2 diabetes.  His blood pressure is within acceptable range in office today, 130/82.  Patient has been followed by [facility] with a baseline creatinine noted to be 1.5-1.6 range, GFR 47 since labs last obtained 2 months ago showing ongoing stability.  He has been evaluated by neurosurgery and pain management for his spinal stenosis.  Neurosurgery wanted to avoid surgical intervention due to his weight and uncontrolled A1c, per their report based off of his MRI he does have some advanced level degenerative changes in the L3-4 facets.  He was subsequently referred to physical therapy which has helped his pain and mobility issues significantly.  Has longstanding pattern of insomnia for which she is utilize lorazepam nightly with good benefit for a number of years.  In regards to his diabetes, patient has been checking his glucose at home but does not have log for review.  He states that his glucose has been quite uncontrolled.  During his last visit his Rybelsus was increased to 14 mg.  His A1c is 9.3% in office today.  Patient states while he is compliant with his medications he is eating what ever he wishes including all the carbohydrates and refined sugars.  Patient is up-to-date on colon cancer screening, no history of elevated PSA.  He needs updated urine microalbumin which he will submit today.  He has no further complaints or concerns     The patient is being seen for a health maintenance evaluation.    Social History  Social History     Socioeconomic History    Marital status: Single  "  Tobacco Use    Smoking status: Former     Current packs/day: 0.00     Average packs/day: 1.1 packs/day for 34.7 years (39.1 ttl pk-yrs)     Types: Cigarettes     Start date: 1973     Quit date: 1999     Years since quittin.6    Smokeless tobacco: Never   Vaping Use    Vaping status: Never Used   Substance and Sexual Activity    Alcohol use: Not Currently    Drug use: Not Currently    Sexual activity: Not Currently     Partners: Male     Birth control/protection: Condom, Abstinence, Partner of same sex     Comment: none currently       General History  Jose A  does have regular dental visits.  He does not complain of vision problems. Last eye exam was when living in [place]. No retinopathy on last check  Immunizations are not up to date. The patient needs the following immunizations: influenza, zoster    Lifestyle  Jose A  consumes a in general, an \"unhealthy\" diet.  He exercises never.    Screening  Last PSA was 0.8  Family history of prostate cancer: no  Last colonoscopy was   Last Completed Colonoscopy            COLORECTAL CANCER SCREENING (COLOGUARD - Every 3 Years) Next due on 2023  Cologuard component of Cologuard - Stool, Per Rectum    2020  COLOGUARD (Done)                . Family history of colon cancer: no      Review of Systems   Constitutional:  Negative for chills, fatigue and fever.   Eyes:  Negative for visual disturbance.   Respiratory:  Negative for cough, chest tightness, shortness of breath and wheezing.    Cardiovascular:  Positive for leg swelling. Negative for chest pain and palpitations.   Gastrointestinal:  Negative for abdominal pain, constipation, diarrhea, nausea and vomiting.   Endocrine: Negative for polydipsia and polyuria.   Genitourinary:  Negative for difficulty urinating, frequency and urgency.   Musculoskeletal:  Positive for arthralgias and back pain.   Neurological:  Positive for numbness. Negative for dizziness, weakness, light-headedness " "and headaches.   Psychiatric/Behavioral:  Positive for sleep disturbance. Negative for agitation, self-injury and suicidal ideas. The patient is nervous/anxious.        Objective     Vital Signs  /82 (BP Location: Left arm, Patient Position: Sitting, Cuff Size: Adult)   Pulse 75   Temp 97.9 °F (36.6 °C) (Temporal)   Ht 177.8 cm (70\")   Wt 132 kg (292 lb)   SpO2 96%   BMI 41.90 kg/m²   Estimated body mass index is 41.9 kg/m² as calculated from the following:    Height as of this encounter: 177.8 cm (70\").    Weight as of this encounter: 132 kg (292 lb).            Physical Exam  Vitals reviewed.   Constitutional:       Appearance: Normal appearance.   HENT:      Head: Normocephalic and atraumatic.      Right Ear: Tympanic membrane, ear canal and external ear normal.      Left Ear: Tympanic membrane, ear canal and external ear normal.      Nose: Nose normal.      Mouth/Throat:      Mouth: Mucous membranes are moist.      Pharynx: Oropharynx is clear.   Eyes:      Conjunctiva/sclera: Conjunctivae normal.      Pupils: Pupils are equal, round, and reactive to light.   Cardiovascular:      Rate and Rhythm: Normal rate and regular rhythm.      Pulses: Normal pulses.      Heart sounds: Murmur heard.   Pulmonary:      Effort: Pulmonary effort is normal.      Breath sounds: Normal breath sounds.   Abdominal:      General: Bowel sounds are normal.      Palpations: Abdomen is soft.   Genitourinary:     Comments: deferred  Musculoskeletal:         General: Normal range of motion.      Cervical back: Neck supple.   Skin:     General: Skin is warm and dry.   Neurological:      Mental Status: He is alert and oriented to person, place, and time.   Psychiatric:         Mood and Affect: Mood normal.         Behavior: Behavior normal.         Thought Content: Thought content normal.         Judgment: Judgment normal.      Diabetic Foot Exam Performed     Assessment and Plan     Diagnoses and all orders for this visit:    1. " Stage 3a chronic kidney disease (Primary)  Assessment & Plan:  Patient has been followed by [facility] with a baseline creatinine noted to be 1.5-1.6 range, GFR 47 since labs last obtained 2 months ago showing ongoing stability.      2. Coronary artery disease involving native coronary artery of native heart without angina pectoris  Assessment & Plan:  [facility] from [facility]/11/2023 revealed chronic total occlusion to the LAD extending from the ostium to the distal vessel and fed by collaterals from the RCA.  Mild nonobstructive CAD involving the proximal circumflex and proximal to mid RCA.   Patient denies chest pain, worsening shortness of breath or fatigue.  Patient denies any current issues with chest pain, worsening shortness of breath or fatigue.      3. Gastroesophageal reflux disease without esophagitis  Assessment & Plan:  Sees ongoing benefit from daily pantoprazole      4. Mixed hyperlipidemia  Assessment & Plan:   His lipids last checked 2 months ago while utilizing atorvastatin 40 mg and fenofibrate 160 mg daily.  Total cholesterol was 131, triglycerides were 203, HDL was 29 and LDL 68      5. Morbid (severe) obesity due to excess calories  Assessment & Plan:  Patient's (Body mass index is 41.9 kg/m².) indicates that they are morbidly/severely obese (BMI > 40 or > 35 with obesity - related health condition) with health conditions that include obstructive sleep apnea, hypertension, diabetes mellitus, dyslipidemias, and osteoarthritis . Weight is unchanged. BMI  is above average; BMI management plan is completed. We discussed portion control and increasing exercise.       6. Obstructive sleep apnea  Assessment & Plan:  Patient reports better compliance with PAP uses after getting a new, smaller PAP mask      7. Primary hypertension  Assessment & Plan:  Patient's blood pressure well-controlled in office today, 130/82.  Currently utilizing regimen of amlodipine 10 mg daily, HCTZ 25 mg daily and  metoprolol 100 mg twice daily      8. Primary insomnia  Assessment & Plan:  Longstanding pattern of insomnia, he is utilizing lorazepam nightly with good benefit for a number of years.  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  He has experienced poor response in the past to medication such as trazodone, Ambien and Lunesta.  Reviewed Jack with satisfactory findings.  He has controlled substance agreement and up-to-date UDS on file       9. Spinal stenosis, lumbar region, with neurogenic claudication  Assessment & Plan:  He has been evaluated by neurosurgery and pain management for his spinal stenosis.  Neurosurgery wanted to avoid surgical intervention due to his weight and uncontrolled A1c, per their report based off of his MRI he does have some advanced level degenerative changes in the L3-4 facets.  He was subsequently referred to physical therapy which has helped his pain and mobility issues significantly.      10. Type 2 diabetes mellitus with hyperglycemia, with long-term current use of insulin  Assessment & Plan:  Patient's diabetes is poorly controlled despite increasing Rybelsus during last visit.  A1c in office today is 9.2%.  He admittedly is not restricting his diet at all, notes eating all the refined sugars and carbohydrates he desires.  He states he often overeats as well.  Currently utilizing regimen of glipizide 10 mg twice daily before meals, Lantus 80 units daily and Rybelsus 14 mg daily.  -Discussed initiation of continuous glucose system to which she he is agreeable.  We discussed initiation of sliding scale with meal coverage as well.  Patient has inquired about insulin pump.  If he is interested in insulin pump will need referral to endocrinology.  For now we will continue current medications, we will go ahead and order continuous glucose monitor but replace referral to endocrinology  for further glucose management.    Orders:  -     Continuous Glucose Transmitter (Dexcom G6 Transmitter) misc; Use 1 Units 1 (One) Time for 1 dose.  Dispense: 1 each; Refill: 0  -     Continuous Glucose  (Dexcom G6 ) device; Use 1 Units 1 (One) Time for 1 dose.  Dispense: 1 each; Refill: 0  -     Continuous Glucose Sensor (Dexcom G6 Sensor); Use Every 10 (Ten) Days.  Dispense: 3 each; Refill: 2  -     Ambulatory Referral to Endocrinology  -     POC Glycosylated Hemoglobin (Hb A1C)  -     POC Microalbumin    11. Benign prostatic hyperplasia with weak urinary stream  Assessment & Plan:  His last visit 3 months ago patient had complaints of frequent nighttime urination, approximately 3-4 episodes.  He also noted weak in urinary stream and urgency.  He denies any feelings of urinary retention, hematuria or urinary obstruction.  He was initiated on tamsulosin 0.5 mg nightly which she states has given him full resolution of his symptom      Other orders  -     olopatadine (PATANOL) 0.1 % ophthalmic solution; Administer 1 drop to both eyes 2 (Two) Times a Day.  Dispense: 5 mL; Refill: 6        Follow Up  No follow-ups on file.    BRENDA Phelan

## 2024-08-22 NOTE — ASSESSMENT & PLAN NOTE
His lipids last checked 2 months ago while utilizing atorvastatin 40 mg and fenofibrate 160 mg daily.  Total cholesterol was 131, triglycerides were 203, HDL was 29 and LDL 68  
He has been evaluated by neurosurgery and pain management for his spinal stenosis.  Neurosurgery wanted to avoid surgical intervention due to his weight and uncontrolled A1c, per their report based off of his MRI he does have some advanced level degenerative changes in the L3-4 facets.  He was subsequently referred to physical therapy which has helped his pain and mobility issues significantly.  
His last visit 3 months ago patient had complaints of frequent nighttime urination, approximately 3-4 episodes.  He also noted weak in urinary stream and urgency.  He denies any feelings of urinary retention, hematuria or urinary obstruction.  He was initiated on tamsulosin 0.5 mg nightly which she states has given him full resolution of his symptom  
[facility] from 2/11/2023 revealed chronic total occlusion to the LAD extending from the ostium to the distal vessel and fed by collaterals from the RCA.  Mild nonobstructive CAD involving the proximal circumflex and proximal to mid RCA.   Patient denies chest pain, worsening shortness of breath or fatigue.  Patient denies any current issues with chest pain, worsening shortness of breath or fatigue.  
Longstanding pattern of insomnia, he is utilizing lorazepam nightly with good benefit for a number of years.  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  He has experienced poor response in the past to medication such as trazodone, Ambien and Lunesta.  Reviewed Jack with satisfactory findings.  He has controlled substance agreement and up-to-date UDS on file   
Patient has been followed by [facility] with a baseline creatinine noted to be 1.5-1.6 range, GFR 47 since labs last obtained 2 months ago showing ongoing stability.  
Patient reports better compliance with PAP uses after getting a new, smaller PAP mask  
Patient's (Body mass index is 41.9 kg/m².) indicates that they are morbidly/severely obese (BMI > 40 or > 35 with obesity - related health condition) with health conditions that include obstructive sleep apnea, hypertension, diabetes mellitus, dyslipidemias, and osteoarthritis . Weight is unchanged. BMI  is above average; BMI management plan is completed. We discussed portion control and increasing exercise.   
Patient's blood pressure well-controlled in office today, 130/82.  Currently utilizing regimen of amlodipine 10 mg daily, HCTZ 25 mg daily and metoprolol 100 mg twice daily  
Patient's diabetes is poorly controlled despite increasing Rybelsus during last visit.  A1c in office today is 9.2%.  He admittedly is not restricting his diet at all, notes eating all the refined sugars and carbohydrates he desires.  He states he often overeats as well.  Currently utilizing regimen of glipizide 10 mg twice daily before meals, Lantus 80 units daily and Rybelsus 14 mg daily.  -Discussed initiation of continuous glucose system to which she he is agreeable.  We discussed initiation of sliding scale with meal coverage as well.  Patient has inquired about insulin pump.  If he is interested in insulin pump will need referral to endocrinology.  For now we will continue current medications, we will go ahead and order continuous glucose monitor but replace referral to endocrinology for further glucose management.  
Sees ongoing benefit from daily pantoprazole  
25 feet

## 2024-08-23 LAB
ALBUMIN/CREATININE RATIO, URINE: <30
POC CREATININE URINE: 30
POC MICROALBUMIN URINE: 10

## 2024-08-30 DIAGNOSIS — E11.43 TYPE 2 DIABETES MELLITUS WITH DIABETIC AUTONOMIC NEUROPATHY, WITHOUT LONG-TERM CURRENT USE OF INSULIN: ICD-10-CM

## 2024-08-30 DIAGNOSIS — F41.9 ANXIETY: ICD-10-CM

## 2024-08-30 RX ORDER — PREGABALIN 150 MG/1
150 CAPSULE ORAL EVERY 12 HOURS
Qty: 60 CAPSULE | Refills: 0 | Status: SHIPPED | OUTPATIENT
Start: 2024-08-30

## 2024-08-30 RX ORDER — LORAZEPAM 1 MG/1
1 TABLET ORAL EVERY 6 HOURS PRN
Qty: 90 TABLET | Refills: 0 | Status: SHIPPED | OUTPATIENT
Start: 2024-08-30

## 2024-08-30 NOTE — TELEPHONE ENCOUNTER
Caller: Jose A Villanueva    Relationship: Self    Best call back number: [phone]     Requested Prescriptions:   Requested Prescriptions     Pending Prescriptions Disp Refills    LORazepam (ATIVAN) 1 MG tablet 90 tablet 0     Sig: Take 1 tablet by mouth Every 6 (Six) Hours As Needed for Anxiety.    pregabalin (LYRICA) 150 MG capsule 60 capsule 0     Sig: Take 1 capsule by mouth Every 12 (Twelve) Hours.        Pharmacy where request should be sent: [id number] - [place] [place] - [address] AT [address] [phone]  - [phone]      Last office visit with prescribing clinician: 8/22/2024   Last telemedicine visit with prescribing clinician: Visit date not found   Next office visit with prescribing clinician: 11/22/2024     Additional details provided by patient: PATIENT HAS CALLED REQUESTING REFILLS ON ABOVE MEDICATIONS. PATIENT STATES HE WILL NEED 120 TABLETS DUE TO TAKING 4 TIMES A DAY.    Does the patient have less than a 3 day supply:  [x] Yes  [] No    Would you like a call back once the refill request has been completed: [] Yes [x] No    If the office needs to give you a call back, can they leave a voicemail: [] Yes [x] No    Dariel Meraz   08/30/24 12:22 EDT

## 2024-09-10 DIAGNOSIS — E11.43 TYPE 2 DIABETES MELLITUS WITH DIABETIC AUTONOMIC NEUROPATHY, WITHOUT LONG-TERM CURRENT USE OF INSULIN: ICD-10-CM

## 2024-09-10 RX ORDER — PREGABALIN 150 MG/1
150 CAPSULE ORAL EVERY 12 HOURS
Qty: 60 CAPSULE | OUTPATIENT
Start: 2024-09-10

## 2024-09-26 DIAGNOSIS — E11.43 TYPE 2 DIABETES MELLITUS WITH DIABETIC AUTONOMIC NEUROPATHY, WITHOUT LONG-TERM CURRENT USE OF INSULIN: ICD-10-CM

## 2024-09-26 DIAGNOSIS — F41.9 ANXIETY: ICD-10-CM

## 2024-09-26 RX ORDER — LORAZEPAM 1 MG/1
1 TABLET ORAL EVERY 8 HOURS PRN
Qty: 60 TABLET | Refills: 0 | Status: SHIPPED | OUTPATIENT
Start: 2024-09-26

## 2024-09-26 RX ORDER — PREGABALIN 150 MG/1
150 CAPSULE ORAL EVERY 12 HOURS
Qty: 60 CAPSULE | Refills: 0 | Status: SHIPPED | OUTPATIENT
Start: 2024-09-26

## 2024-09-30 DIAGNOSIS — Z79.4 CONTROLLED TYPE 2 DIABETES MELLITUS WITH HYPERGLYCEMIA, WITH LONG-TERM CURRENT USE OF INSULIN: ICD-10-CM

## 2024-09-30 DIAGNOSIS — R39.12 BENIGN PROSTATIC HYPERPLASIA WITH WEAK URINARY STREAM: ICD-10-CM

## 2024-09-30 DIAGNOSIS — N40.1 BENIGN PROSTATIC HYPERPLASIA WITH WEAK URINARY STREAM: ICD-10-CM

## 2024-09-30 DIAGNOSIS — E11.65 CONTROLLED TYPE 2 DIABETES MELLITUS WITH HYPERGLYCEMIA, WITH LONG-TERM CURRENT USE OF INSULIN: ICD-10-CM

## 2024-09-30 RX ORDER — TAMSULOSIN HYDROCHLORIDE 0.4 MG/1
1 CAPSULE ORAL DAILY
Qty: 90 CAPSULE | Refills: 1 | Status: SHIPPED | OUTPATIENT
Start: 2024-09-30

## 2024-09-30 RX ORDER — ORAL SEMAGLUTIDE 14 MG/1
14 TABLET ORAL DAILY
Qty: 90 TABLET | Refills: 1 | Status: SHIPPED | OUTPATIENT
Start: 2024-09-30

## 2024-09-30 NOTE — TELEPHONE ENCOUNTER
Caller: [facility] - [place], [place] - [address] - [phone] [facility] [phone]    Relationship: Pharmacy, SAMIR Osei call back number: [phone]     Requested Prescriptions:   Requested Prescriptions     Pending Prescriptions Disp Refills    Semaglutide (Rybelsus) 14 MG tablet 90 tablet 1     Sig: Take 1 tablet by mouth Daily.    tamsulosin (FLOMAX) 0.4 MG capsule 24 hr capsule 90 capsule 1     Sig: Take 1 capsule by mouth Daily.        Pharmacy where request should be sent: [facility] - [place], [place] - [address] [facility] - [phone] [facility] [phone] FX     Last office visit with prescribing clinician: 8/22/2024   Last telemedicine visit with prescribing clinician: Visit date not found   Next office visit with prescribing clinician: 11/22/2024     Additional details provided by patient: PLEASE SEND 90 DAY SUPPLY WITH REFILLS    Wendy Curry Rep   09/30/24 10:47 EDT

## 2024-10-21 DIAGNOSIS — E11.43 TYPE 2 DIABETES MELLITUS WITH DIABETIC AUTONOMIC NEUROPATHY, WITHOUT LONG-TERM CURRENT USE OF INSULIN: ICD-10-CM

## 2024-10-21 DIAGNOSIS — F41.9 ANXIETY: ICD-10-CM

## 2024-10-21 RX ORDER — LORAZEPAM 1 MG/1
1 TABLET ORAL EVERY 8 HOURS PRN
Qty: 60 TABLET | Refills: 0 | Status: SHIPPED | OUTPATIENT
Start: 2024-10-21

## 2024-10-21 RX ORDER — PREGABALIN 150 MG/1
150 CAPSULE ORAL EVERY 12 HOURS
Qty: 60 CAPSULE | Refills: 0 | Status: SHIPPED | OUTPATIENT
Start: 2024-10-21

## 2024-10-21 NOTE — TELEPHONE ENCOUNTER
Caller: Jose A Villanueva    Relationship: Self    Best call back number: [phone]     Requested Prescriptions:   Requested Prescriptions     Pending Prescriptions Disp Refills    pregabalin (LYRICA) 150 MG capsule 60 capsule 0     Sig: Take 1 capsule by mouth Every 12 (Twelve) Hours.    LORazepam (ATIVAN) 1 MG tablet 60 tablet 0     Sig: Take 1 tablet by mouth Every 8 (Eight) Hours As Needed for Anxiety.        Pharmacy where request should be sent: [facility] - [place] [id number] [facility] - [phone]  - [phone]      Last office visit with prescribing clinician: 8/22/2024   Last telemedicine visit with prescribing clinician: Visit date not found   Next office visit with prescribing clinician: 11/22/2024     Does the patient have less than a 3 day supply:  [x] Yes  [] No    Would you like a call back once the refill request has been completed: [] Yes [] No    If the office needs to give you a call back, can they leave a voicemail: [] Yes [] No    Wendy Sol Rep   10/21/24 14:27 EDT

## 2024-10-28 RX ORDER — CALCIUM CITRATE/VITAMIN D3 200MG-6.25
TABLET ORAL
Qty: 100 EACH | Refills: 2 | Status: SHIPPED | OUTPATIENT
Start: 2024-10-28

## 2024-10-29 DIAGNOSIS — E11.43 TYPE 2 DIABETES MELLITUS WITH DIABETIC AUTONOMIC NEUROPATHY, WITHOUT LONG-TERM CURRENT USE OF INSULIN: ICD-10-CM

## 2024-10-30 RX ORDER — PREGABALIN 150 MG/1
150 CAPSULE ORAL EVERY 12 HOURS
Qty: 60 CAPSULE | Refills: 0 | Status: SHIPPED | OUTPATIENT
Start: 2024-10-30

## 2024-11-13 DIAGNOSIS — F41.9 ANXIETY: ICD-10-CM

## 2024-11-13 RX ORDER — LORAZEPAM 1 MG/1
1 TABLET ORAL EVERY 8 HOURS PRN
Qty: 60 TABLET | Refills: 0 | Status: SHIPPED | OUTPATIENT
Start: 2024-11-13

## 2024-11-13 NOTE — TELEPHONE ENCOUNTER
Patient needs a csa and his appointment is on 11/22/2024 I have a note to have it updated at that visit

## 2024-11-13 NOTE — TELEPHONE ENCOUNTER
Caller: Jose A Villanueva    Relationship: Self    Best call back number: [phone]     Requested Prescriptions:   Requested Prescriptions     Pending Prescriptions Disp Refills    LORazepam (ATIVAN) 1 MG tablet 60 tablet 0     Sig: Take 1 tablet by mouth Every 8 (Eight) Hours As Needed for Anxiety.      pregabalin (LYRICA) 150 MG capsule  150 mg, Every 12 Hours       Pharmacy where request should be sent: [facility] - [place] [id number] [facility] - [phone] [facility] [phone] FX     Last office visit with prescribing clinician: 8/22/2024   Last telemedicine visit with prescribing clinician: Visit date not found   Next office visit with prescribing clinician: 11/22/2024     Additional details provided by patient: PATIENT GETS MEDICINE THROUGH MAIL ORDER, PLEASE WRITE PRESCRIPTION FOR A 90 DAY SUPPLY    Does the patient have less than a 3 day supply:  [] Yes  [] No    Would you like a call back once the refill request has been completed: [] Yes [x] No    If the office needs to give you a call back, can they leave a voicemail: [] Yes [x] No    Wendy Oconnell Rep   11/13/24 15:19 EST

## 2024-12-02 ENCOUNTER — OFFICE VISIT (OUTPATIENT)
Dept: FAMILY MEDICINE CLINIC | Facility: CLINIC | Age: 74
End: 2024-12-02
Payer: MEDICARE

## 2024-12-02 VITALS
TEMPERATURE: 97.9 F | BODY MASS INDEX: 40.8 KG/M2 | HEIGHT: 70 IN | WEIGHT: 285 LBS | HEART RATE: 88 BPM | DIASTOLIC BLOOD PRESSURE: 74 MMHG | OXYGEN SATURATION: 93 % | SYSTOLIC BLOOD PRESSURE: 110 MMHG | RESPIRATION RATE: 18 BRPM

## 2024-12-02 DIAGNOSIS — N18.31 STAGE 3A CHRONIC KIDNEY DISEASE: ICD-10-CM

## 2024-12-02 DIAGNOSIS — F51.01 PRIMARY INSOMNIA: ICD-10-CM

## 2024-12-02 DIAGNOSIS — R39.12 BENIGN PROSTATIC HYPERPLASIA WITH WEAK URINARY STREAM: ICD-10-CM

## 2024-12-02 DIAGNOSIS — I38 VALVULAR HEART DISEASE: Chronic | ICD-10-CM

## 2024-12-02 DIAGNOSIS — G47.33 OBSTRUCTIVE SLEEP APNEA: ICD-10-CM

## 2024-12-02 DIAGNOSIS — E78.2 MIXED HYPERLIPIDEMIA: ICD-10-CM

## 2024-12-02 DIAGNOSIS — I10 PRIMARY HYPERTENSION: ICD-10-CM

## 2024-12-02 DIAGNOSIS — Z79.4 TYPE 2 DIABETES MELLITUS WITH HYPERGLYCEMIA, WITH LONG-TERM CURRENT USE OF INSULIN: Primary | ICD-10-CM

## 2024-12-02 DIAGNOSIS — N40.1 BENIGN PROSTATIC HYPERPLASIA WITH WEAK URINARY STREAM: ICD-10-CM

## 2024-12-02 DIAGNOSIS — M48.062 SPINAL STENOSIS, LUMBAR REGION, WITH NEUROGENIC CLAUDICATION: ICD-10-CM

## 2024-12-02 DIAGNOSIS — E11.65 TYPE 2 DIABETES MELLITUS WITH HYPERGLYCEMIA, WITH LONG-TERM CURRENT USE OF INSULIN: Primary | ICD-10-CM

## 2024-12-02 DIAGNOSIS — E66.01 MORBID (SEVERE) OBESITY DUE TO EXCESS CALORIES: ICD-10-CM

## 2024-12-02 DIAGNOSIS — I25.10 CORONARY ARTERY DISEASE INVOLVING NATIVE CORONARY ARTERY OF NATIVE HEART WITHOUT ANGINA PECTORIS: Chronic | ICD-10-CM

## 2024-12-02 DIAGNOSIS — Z23 ENCOUNTER FOR IMMUNIZATION: ICD-10-CM

## 2024-12-02 PROCEDURE — 1160F RVW MEDS BY RX/DR IN RCRD: CPT | Performed by: NURSE PRACTITIONER

## 2024-12-02 PROCEDURE — 3074F SYST BP LT 130 MM HG: CPT | Performed by: NURSE PRACTITIONER

## 2024-12-02 PROCEDURE — 99214 OFFICE O/P EST MOD 30 MIN: CPT | Performed by: NURSE PRACTITIONER

## 2024-12-02 PROCEDURE — 1159F MED LIST DOCD IN RCRD: CPT | Performed by: NURSE PRACTITIONER

## 2024-12-02 PROCEDURE — 90662 IIV NO PRSV INCREASED AG IM: CPT | Performed by: NURSE PRACTITIONER

## 2024-12-02 PROCEDURE — G0008 ADMIN INFLUENZA VIRUS VAC: HCPCS | Performed by: NURSE PRACTITIONER

## 2024-12-02 PROCEDURE — 3046F HEMOGLOBIN A1C LEVEL >9.0%: CPT | Performed by: NURSE PRACTITIONER

## 2024-12-02 PROCEDURE — 3078F DIAST BP <80 MM HG: CPT | Performed by: NURSE PRACTITIONER

## 2024-12-02 PROCEDURE — 36415 COLL VENOUS BLD VENIPUNCTURE: CPT | Performed by: NURSE PRACTITIONER

## 2024-12-02 RX ORDER — MAGNESIUM OXIDE 400 MG/1
400 TABLET ORAL 2 TIMES DAILY
COMMUNITY

## 2024-12-02 RX ORDER — LORAZEPAM 2 MG/1
2 TABLET ORAL EVERY 6 HOURS PRN
Qty: 60 TABLET | Refills: 0 | Status: SHIPPED | OUTPATIENT
Start: 2024-12-02

## 2024-12-02 NOTE — LETTER
[facility]  Vaccine Consent Form    Patient Name:  Jose A Villanueva  Patient :  1950     Vaccine(s) Ordered    Fluzone High-Dose 65+yrs        Screening Checklist  The following questions should be completed prior to vaccination. If you answer “yes” to any question, it does not necessarily mean you should not be vaccinated. It just means we may need to clarify or ask more questions. If a question is unclear, please ask your healthcare provider to explain it.    Yes No   Any fever or moderate to severe illness today (mild illness and/or antibiotic treatment are not contraindications)?     Do you have a history of a serious reaction to any previous vaccinations, such as anaphylaxis, encephalopathy within 7 days, Guillain-Waukau syndrome within 6 weeks, seizure?     Have you received any live vaccine(s) (e.g MMR, CECY) or any other vaccines in the last month (to ensure duplicate doses aren't given)?     Do you have an anaphylactic allergy to latex (DTaP, DTaP-IPV, Hep A, Hep B, MenB, RV, Td, Tdap), baker’s yeast (Hep B, HPV), polysorbates (RSV, nirsevimab, PCV 20, Rotavirrus, Tdap, Shingrix), or gelatin (CECY, MMR)?     Do you have an anaphylactic allergy to neomycin (Rabies, CECY, MMR, IPV, Hep A), polymyxin B (IPV), or streptomycin (IPV)?      Any cancer, leukemia, AIDS, or other immune system disorder? (CECY, MMR, RV)     Do you have a parent, brother, or sister with an immune system problem (if immune competence of vaccine recipient clinically verified, can proceed)? (MMR, CECY)     Any recent steroid treatments for >2 weeks, chemotherapy, or radiation treatment? (CECY, MMR)     Have you received antibody-containing blood transfusions or IVIG in the past 11 months (recommended interval is dependent on product)? (MMR, CECY)     Have you taken antiviral drugs (acyclovir, famciclovir, valacyclovir for CECY) in the last 24 or 48 hours, respectively?      Are you pregnant or planning to become pregnant within 1 month?  "(CECY, MMR, HPV, IPV, MenB, Abrexvy; For Hep B- refer to Engerix-B; For RSV - Abrysvo is indicated for 32-36 weeks of pregnancy from September to January)     For infants, have you ever been told your child has had intussusception or a medical emergency involving obstruction of the intestine (Rotavirus)? If not for an infant, can skip this question.         *Ordering Physicians/APC should be consulted if \"yes\" is checked by the patient or guardian above.  I have received, read, and understand the Vaccine Information Statement (VIS) for each vaccine ordered.  I have considered my or my child's health status as well as the health status of my close contacts.  I have taken the opportunity to discuss my vaccine questions with my or my child's health care provider.   I have requested that the ordered vaccine(s) be given to me or my child.  I understand the benefits and risks of the vaccines.  I understand that I should remain in the clinic for 15 minutes after receiving the vaccine(s).  _________________________________________________________  Signature of Patient or Parent/Legal Guardian ____________________  Date   "

## 2024-12-02 NOTE — PROGRESS NOTES
Office Note     Name: Jose A Villanueva    : 1950     MRN: [id number]     Chief Complaint  follow up DM    Subjective     History of Present Illness:  Jose A Villanueva is a 74 y.o. male who presents today for follow-up on chronic conditions including valvular heart disease, type 2 diabetes, stage IIIa chronic kidney disease, spinal stenosis, insomnia, hypertension, PIPPA, obesity, hyperlipidemia, coronary artery disease and BPH.  Patient's blood pressure well-controlled in office today, 110/74.  Patient does check his glucose at home but verbalizes it has been running somewhat elevated.  He brought in averages for review today which appear to be running high.  He is currently utilizing Lantus 80 units nightly, glipizide 10 mg twice daily and Rybelsus 14 mg daily.  Patient is unable to utilize metformin due to decreased kidney function.  He admittedly has not been very compliant with his diet.  Result his A1c in office today is 11.1%.  During his last visit he was referred to endocrinology for further evaluation but has not had that appointment as of yet.  We did order a continuous glucose monitor that he has in his possession but was awaiting his endocrinology visit to initiate use.  He has referral to endocrinology was prompted by his interest in an insulin pump due to poorly controlled glucose. Patient has been followed by [facility] with a baseline creatinine noted to be 1.5-1.6 range, GFR 47 since labs last obtained 2 months ago showing ongoing stability. He has been evaluated by neurosurgery and pain management for his spinal stenosis. Neurosurgery wanted to avoid surgical intervention due to his weight and uncontrolled A1c, per their report based off of his MRI he does have some advanced level degenerative changes in the L3-4 facets. He was subsequently referred to physical therapy which has helped his pain and mobility issues significantly.     Review of Systems   Constitutional:   Negative for unexpected weight loss.   Eyes:  Negative for blurred vision.   Respiratory:  Negative for cough, chest tightness, shortness of breath and wheezing.    Cardiovascular:  Negative for chest pain, palpitations and leg swelling.   Gastrointestinal:  Negative for abdominal pain, constipation, diarrhea, nausea and vomiting.   Endocrine: Positive for polyuria. Negative for polydipsia.   Neurological:  Positive for tremors and confusion. Negative for speech difficulty.       Objective     Past Medical History:   Diagnosis Date    Alcoholism 1992    in remission    Allergic cerumenex    rash    Anxiety     Arthritis     Asthma 2015    sleep apnea/APAP    Benign prostatic hyperplasia 2022 urgency    Cervical disc disorder 1986    Cholelithiasis removed 1976    CKD (chronic kidney disease) 02/23/2023    Coronary artery disease involving native coronary artery of native heart without angina pectoris 11/01/2022    Depression     Diabetes mellitus     Erectile dysfunction 2015    BP meds?    GERD (gastroesophageal reflux disease)     Heart murmur 2004    R. carotid bruit 2024    HL (hearing loss) hearing aids    Hyperlipidemia     Hypertension     Low back pain     casual/ occasional    Multiple closed fractures of ribs of left side 09/08/2022    Obesity have gastric band    2007    Peripheral neuropathy 2020    Substance abuse 1992    alcoholism    Thoracic disc disorder 1986    Troponin level elevated 11/01/2022    Formatting of this note might be different from the original.   Added automatically from request for surgery [id number]      Visual impairment glasses     Past Surgical History:   Procedure Laterality Date    BARIATRIC SURGERY  band 2007    CARDIAC CATHETERIZATION  2022    CHOLECYSTECTOMY      COLONOSCOPY  2020    EPIDURAL BLOCK  2022    by pain doctor    JOINT REPLACEMENT  2014    right hip    KNEE ARTHROSCOPY Bilateral     REPLACEMENT TOTAL HIP ONCOLOGIC Right     TONSILLECTOMY       Family History  "  Problem Relation Age of Onset    Arthritis Mother     Hypertension Mother     COPD Mother             Anxiety disorder Mother     Hearing loss Mother     Arthritis Father     Hypertension Father     Hyperlipidemia Father             Alcohol abuse Brother             Drug abuse Brother             Early death Brother         51 yo    Hyperlipidemia Sister     Hyperlipidemia Sister        Vital Signs  /74 (BP Location: Left arm, Patient Position: Sitting, Cuff Size: Adult)   Pulse 88   Temp 97.9 °F (36.6 °C) (Temporal)   Resp 18   Ht 177.8 cm (70\")   Wt 129 kg (285 lb)   SpO2 93%   BMI 40.89 kg/m²   Estimated body mass index is 40.89 kg/m² as calculated from the following:    Height as of this encounter: 177.8 cm (70\").    Weight as of this encounter: 129 kg (285 lb).  Facility age limit for growth %marga is 20 years.    Physical Exam  Vitals reviewed.   Constitutional:       Appearance: He is obese.   HENT:      Head: Normocephalic and atraumatic.      Right Ear: Tympanic membrane, ear canal and external ear normal.      Left Ear: Tympanic membrane, ear canal and external ear normal.      Nose: Nose normal.      Mouth/Throat:      Mouth: Mucous membranes are moist.      Pharynx: Oropharynx is clear.   Eyes:      Conjunctiva/sclera: Conjunctivae normal.      Pupils: Pupils are equal, round, and reactive to light.   Cardiovascular:      Rate and Rhythm: Normal rate and regular rhythm.      Pulses: Normal pulses.      Heart sounds: Normal heart sounds.   Pulmonary:      Effort: Pulmonary effort is normal.      Breath sounds: Normal breath sounds.   Abdominal:      General: Bowel sounds are normal.      Palpations: Abdomen is soft.   Musculoskeletal:      Cervical back: Neck supple.   Skin:     General: Skin is warm and dry.      Capillary Refill: Capillary refill takes less than 2 seconds.   Neurological:      Mental Status: He is alert and oriented to person, place, and " time.   Psychiatric:         Mood and Affect: Mood normal.         Behavior: Behavior normal.         Thought Content: Thought content normal.         Judgment: Judgment normal.             POCT Results (if applicable):  Results for orders placed or performed in visit on 12/02/24   Hemoglobin A1c    Collection Time: 12/02/24  2:11 PM    Specimen: Arm, Left; Blood    Blood  Release to kimberly   Result Value Ref Range    Hemoglobin A1C 11.0 (H) 4.8 - 5.6 %            Assessment and Plan     Diagnoses and all orders for this visit:    1. Type 2 diabetes mellitus with hyperglycemia, with long-term current use of insulin (Primary)  Assessment & Plan:  Patient does check his glucose at home but verbalizes it has been running somewhat elevated.  He brought in averages for review today which appear to be running high.  He is currently utilizing Lantus 80 units nightly, glipizide 10 mg twice daily and Rybelsus 14 mg daily.  Patient is unable to utilize metformin due to decreased kidney function.  He admittedly has not been very compliant with his diet.  Result his A1c in office today is 11.1%.  During his last visit he was referred to endocrinology for further evaluation but has not had that appointment as of yet.  We did order a continuous glucose monitor that he has in his possession but was awaiting his endocrinology visit to initiate use.  He has referral to endocrinology was prompted by his interest in an insulin pump due to poorly controlled glucose.  -For now we will continue his glipizide and Lantus at current doses, glipizide at 10 mg twice daily and Lantus 80 units nightly.  -Will discontinue Rybelsus  -Will initiate semaglutide 0.25 mg weekly, in the past this has been financial burden, however hopefully may be more affordable with cardiac coverage as well.  -Awaiting endocrinology appointment scheduled for January.    Orders:  -     Hemoglobin A1c; Future  -     Hemoglobin A1c  -     Semaglutide,0.25 or 0.5MG/DOS,  (OZEMPIC) 2 MG/1.5ML solution pen-injector; Inject 0.25 mg under the skin into the appropriate area as directed 1 (One) Time Per Week.  Dispense: 1.5 mL; Refill: 3    2. Encounter for immunization  -     Fluzone High-Dose 65+yrs    3. Valvular heart disease  Assessment & Plan:  The aortic valve is trileaflet with moderate, diffuse calcification.     Aortic Valve: There is trace regurgitation. There is mild stenosis. The    peak gradient measures 33 mmHg. The mean gradient measures 18 mmHg. The    peak aortic velocity was measured in the apical view.     Mitral Valve: There is trace regurgitation.     Tricuspid Valve: There is trace regurgitation.       4. Stage 3a chronic kidney disease  Assessment & Plan:  Patient has been followed by [facility] with a baseline creatinine noted to be 1.5-1.6 range, GFR 47 since labs last obtained 2 months ago showing ongoing stability.       5. Spinal stenosis, lumbar region, with neurogenic claudication  Assessment & Plan:  He has been evaluated by neurosurgery and pain management for his spinal stenosis. Neurosurgery wanted to avoid surgical intervention due to his weight and uncontrolled A1c, per their report based off of his MRI he does have some advanced level degenerative changes in the L3-4 facets. He was subsequently referred to physical therapy which has helped his pain and mobility issues significantly.       6. Primary hypertension  Assessment & Plan:  Blood pressure resuming ACE control in office today, 110/74.  Continue amlodipine 10 mg daily, continue HCTZ 25 mg daily and continue metoprolol 100 mg twice daily      7. Primary insomnia  Assessment & Plan:  Longstanding pattern of insomnia, he is utilizing lorazepam nightly with good benefit for a number of years.  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  He has experienced poor response in  the past to medication such as trazodone, Ambien and Lunesta.  Reviewed Jack with satisfactory findings.  He has controlled substance agreement and up-to-date UDS on file     Orders:  -     LORazepam (ATIVAN) 2 MG tablet; Take 1 tablet by mouth Every 6 (Six) Hours As Needed for Anxiety.  Dispense: 60 tablet; Refill: 0    8. Morbid (severe) obesity due to excess calories    9. Obstructive sleep apnea  Assessment & Plan:  Patient reports better compliance with PAP uses after getting a new, smaller PAP mask       10. Mixed hyperlipidemia  Assessment & Plan:  His lipids last checked 2 months ago while utilizing atorvastatin 40 mg and fenofibrate 160 mg daily. Total cholesterol was 131, triglycerides were 203, HDL was 29 and LDL 68       11. Coronary artery disease involving native coronary artery of native heart without angina pectoris  Assessment & Plan:  LHC from 2/11/2023 revealed chronic total occlusion to the LAD extending from the ostium to the distal vessel and fed by collaterals from the RCA.  Mild nonobstructive CAD involving the proximal circumflex and proximal to mid RCA.   Patient denies chest pain, worsening shortness of breath or fatigue.  Patient denies any current issues with chest pain, worsening shortness of breath or fatigue.      12. Benign prostatic hyperplasia with weak urinary stream  Assessment & Plan:  Approximately 9 months ago ago patient had complaints of frequent nighttime urination, approximately 3-4 episodes. He also noted weak in urinary stream and urgency. He denies any feelings of urinary retention, hematuria or urinary obstruction. He was initiated on tamsulosin 0.5 mg nightly which she states has given him full resolution of his symptom              Vaccine Counseling:  “Discussed risks/benefits to vaccination, reviewed components of the vaccine, discussed VIS, discussed informed consent, informed consent obtained. Patient/Parent was allowed to accept or refuse vaccine. Questions  answered to satisfactory state of patient/Parent. We reviewed typical age appropriate and seasonally appropriate vaccinations. Reviewed immunization history and updated state vaccination form as needed. Patient was counseled on Influenza      Follow Up  No follow-ups on file.    Radha Asher, APRN

## 2024-12-02 NOTE — PROGRESS NOTES
Venipuncture Blood Specimen Collection  Venipuncture performed in left arm by Letha Thompson MA with good hemostasis. Patient tolerated the procedure well without complications.   12/02/24   Letha Thompson MA

## 2024-12-03 LAB — HBA1C MFR BLD: 11 % (ref 4.8–5.6)

## 2024-12-05 NOTE — ASSESSMENT & PLAN NOTE
LHC from 2/11/2023 revealed chronic total occlusion to the LAD extending from the ostium to the distal vessel and fed by collaterals from the RCA.  Mild nonobstructive CAD involving the proximal circumflex and proximal to mid RCA.   Patient denies chest pain, worsening shortness of breath or fatigue.  Patient denies any current issues with chest pain, worsening shortness of breath or fatigue.

## 2024-12-05 NOTE — ASSESSMENT & PLAN NOTE
Blood pressure resuming ACE control in office today, 110/74.  Continue amlodipine 10 mg daily, continue HCTZ 25 mg daily and continue metoprolol 100 mg twice daily

## 2024-12-05 NOTE — ASSESSMENT & PLAN NOTE
His lipids last checked 2 months ago while utilizing atorvastatin 40 mg and fenofibrate 160 mg daily. Total cholesterol was 131, triglycerides were 203, HDL was 29 and LDL 68

## 2024-12-05 NOTE — ASSESSMENT & PLAN NOTE
Patient does check his glucose at home but verbalizes it has been running somewhat elevated.  He brought in averages for review today which appear to be running high.  He is currently utilizing Lantus 80 units nightly, glipizide 10 mg twice daily and Rybelsus 14 mg daily.  Patient is unable to utilize metformin due to decreased kidney function.  He admittedly has not been very compliant with his diet.  Result his A1c in office today is 11.1%.  During his last visit he was referred to endocrinology for further evaluation but has not had that appointment as of yet.  We did order a continuous glucose monitor that he has in his possession but was awaiting his endocrinology visit to initiate use.  He has referral to endocrinology was prompted by his interest in an insulin pump due to poorly controlled glucose.  -For now we will continue his glipizide and Lantus at current doses, glipizide at 10 mg twice daily and Lantus 80 units nightly.  -Will discontinue Rybelsus  -Will initiate semaglutide 0.25 mg weekly, in the past this has been financial burden, however hopefully may be more affordable with cardiac coverage as well.  -Awaiting endocrinology appointment scheduled for January.

## 2024-12-05 NOTE — ASSESSMENT & PLAN NOTE
Patient has been followed by [facility] with a baseline creatinine noted to be 1.5-1.6 range, GFR 47 since labs last obtained 2 months ago showing ongoing stability.

## 2024-12-05 NOTE — ASSESSMENT & PLAN NOTE
Approximately 9 months ago ago patient had complaints of frequent nighttime urination, approximately 3-4 episodes. He also noted weak in urinary stream and urgency. He denies any feelings of urinary retention, hematuria or urinary obstruction. He was initiated on tamsulosin 0.5 mg nightly which she states has given him full resolution of his symptom

## 2024-12-05 NOTE — ASSESSMENT & PLAN NOTE
He has been evaluated by neurosurgery and pain management for his spinal stenosis. Neurosurgery wanted to avoid surgical intervention due to his weight and uncontrolled A1c, per their report based off of his MRI he does have some advanced level degenerative changes in the L3-4 facets. He was subsequently referred to physical therapy which has helped his pain and mobility issues significantly.

## 2024-12-05 NOTE — ASSESSMENT & PLAN NOTE
The aortic valve is trileaflet with moderate, diffuse calcification.     Aortic Valve: There is trace regurgitation. There is mild stenosis. The    peak gradient measures 33 mmHg. The mean gradient measures 18 mmHg. The    peak aortic velocity was measured in the apical view.     Mitral Valve: There is trace regurgitation.     Tricuspid Valve: There is trace regurgitation.

## 2024-12-20 ENCOUNTER — TELEPHONE (OUTPATIENT)
Dept: FAMILY MEDICINE CLINIC | Facility: CLINIC | Age: 74
End: 2024-12-20
Payer: MEDICARE

## 2024-12-20 DIAGNOSIS — F51.01 PRIMARY INSOMNIA: ICD-10-CM

## 2024-12-20 RX ORDER — LORAZEPAM 2 MG/1
2 TABLET ORAL EVERY 6 HOURS PRN
Qty: 60 TABLET | Refills: 0 | Status: SHIPPED | OUTPATIENT
Start: 2024-12-20

## 2024-12-20 NOTE — TELEPHONE ENCOUNTER
Caller: Rich Jose A    Relationship: Self    Best call back number: [phone]     Requested Prescriptions:   Requested Prescriptions     Pending Prescriptions Disp Refills    LORazepam (ATIVAN) 2 MG tablet 60 tablet 0     Sig: Take 1 tablet by mouth Every 6 (Six) Hours As Needed for Anxiety.        Pharmacy where request should be sent: [facility] - [facility] [address] - [phone]  - [phone]     Last office visit with prescribing clinician: 12/2/2024   Last telemedicine visit with prescribing clinician: Visit date not found   Next office visit with prescribing clinician: 3/6/2025     Additional details provided by patient: PT HAS 3 DAYS MEDS LEFT.    Does the patient have less than a 3 day supply:  [x] Yes  [] No    Would you like a call back once the refill request has been completed: [] Yes [x] No    If the office needs to give you a call back, can they leave a voicemail: [] Yes [x] No    Wendy Ruvalcaba Rep   12/20/24 10:02 EST

## 2024-12-20 NOTE — TELEPHONE ENCOUNTER
Caller: Jose A Villanueva    Relationship: Self    Best call back number: [phone]     What orders are you requesting (i.e. lab or imaging): ECHO CARDIOGRAM    In what timeframe would the patient need to come in: ASAP    Where will you receive your lab/imaging services: [facility]    Additional notes: PT WAS UNABLE TO MAKE PREVIOUS APPT DUE TO WEATHER. PLEASE PUT IN ORDER FOR ANOTHER IN [facility].

## 2024-12-31 NOTE — ASSESSMENT & PLAN NOTE
Patient currently utilizing atorvastatin 20 mg once daily and fenofibrate 160 mg once daily   Reason for call:   [x] Refill   [] Prior Auth  [] Other:     Office:   [x] PCP/Provider - jeremie burt/ shaji  [] Specialty/Provider -     Medication:     SUMAtriptan (IMITREX) 25 mg tablet     omeprazole (PriLOSEC) 20 mg delayed release capsule    Dose/Frequency:     Sig: Take 1 tablet (25 mg total) by mouth once as needed for migraine    Sig: Take 1 capsule (20 mg total) by mouth daily    Quantity:     10    90    Pharmacy:   [facility] [place] [place] [address] [place] [address] [employer] [address], [place] [address]  Phone: [phone]  Fax: [phone]    Does the patient have enough for 3 days?   [] Yes   [x] No - Send as HP to POD

## 2025-01-09 DIAGNOSIS — F51.01 PRIMARY INSOMNIA: ICD-10-CM

## 2025-01-09 RX ORDER — LORAZEPAM 2 MG/1
2 TABLET ORAL EVERY 6 HOURS PRN
Qty: 60 TABLET | Refills: 0 | Status: SHIPPED | OUTPATIENT
Start: 2025-01-09

## 2025-01-09 NOTE — TELEPHONE ENCOUNTER
Caller: Jose A Villanueva    Relationship: Self    Best call back number: [phone]     Requested Prescriptions:   Requested Prescriptions     Pending Prescriptions Disp Refills    LORazepam (ATIVAN) 2 MG tablet 60 tablet 0     Sig: Take 1 tablet by mouth Every 6 (Six) Hours As Needed for Anxiety.        Pharmacy where request should be sent: [facility] - [facility] [address] - [phone]  - [phone] FX     Last office visit with prescribing clinician: 12/2/2024   Last telemedicine visit with prescribing clinician: Visit date not found   Next office visit with prescribing clinician: 3/6/2025     Additional details provided by patient: HAS 2 DAYS REMAINING     Does the patient have less than a 3 day supply:  [x] Yes  [] No    Would you like a call back once the refill request has been completed: [] Yes [x] No    If the office needs to give you a call back, can they leave a voicemail: [] Yes [x] No    Wendy Meng Rep   01/09/25 11:39 EST

## 2025-01-13 DIAGNOSIS — E78.5 HYPERLIPIDEMIA, UNSPECIFIED HYPERLIPIDEMIA TYPE: ICD-10-CM

## 2025-01-13 DIAGNOSIS — E11.43 TYPE 2 DIABETES MELLITUS WITH DIABETIC AUTONOMIC NEUROPATHY, WITHOUT LONG-TERM CURRENT USE OF INSULIN: ICD-10-CM

## 2025-01-13 DIAGNOSIS — I10 PRIMARY HYPERTENSION: ICD-10-CM

## 2025-01-13 DIAGNOSIS — F51.01 PRIMARY INSOMNIA: ICD-10-CM

## 2025-01-13 RX ORDER — ATORVASTATIN CALCIUM 40 MG/1
40 TABLET, FILM COATED ORAL DAILY
Qty: 90 TABLET | Refills: 3 | Status: SHIPPED | OUTPATIENT
Start: 2025-01-13

## 2025-01-13 RX ORDER — AMLODIPINE BESYLATE 10 MG/1
10 TABLET ORAL DAILY
Qty: 90 TABLET | Refills: 3 | Status: SHIPPED | OUTPATIENT
Start: 2025-01-13

## 2025-01-13 RX ORDER — PREGABALIN 150 MG/1
150 CAPSULE ORAL EVERY 12 HOURS
Qty: 60 CAPSULE | Refills: 2 | Status: SHIPPED | OUTPATIENT
Start: 2025-01-13

## 2025-01-13 RX ORDER — GLIPIZIDE 10 MG/1
10 TABLET ORAL
Qty: 180 TABLET | Refills: 3 | Status: SHIPPED | OUTPATIENT
Start: 2025-01-13

## 2025-01-13 RX ORDER — HYDROCHLOROTHIAZIDE 25 MG/1
25 TABLET ORAL DAILY
Qty: 90 TABLET | Refills: 3 | Status: SHIPPED | OUTPATIENT
Start: 2025-01-13

## 2025-01-18 DIAGNOSIS — F51.01 PRIMARY INSOMNIA: ICD-10-CM

## 2025-01-20 RX ORDER — LORAZEPAM 2 MG/1
TABLET ORAL
Qty: 60 TABLET | OUTPATIENT
Start: 2025-01-20

## 2025-02-04 ENCOUNTER — PATIENT OUTREACH (OUTPATIENT)
Dept: CASE MANAGEMENT | Facility: OTHER | Age: 75
End: 2025-02-04
Payer: MEDICARE

## 2025-02-04 NOTE — OUTREACH NOTE
AMBULATORY CASE MANAGEMENT NOTE    Names and Relationships of Patient/Support Persons: Contact: VillanuevaJose A; Relationship: Self -     Patient Outreach    RN-ACM Proactive outreach call to patient identified for HRCM.  Explained role of RN-ACM. Reviewed patient's daily routine with DM mgt.  Discussed importance of compliance daily with medications, monitoring BS, diet and exercise.   Patient voiced compliance with DM meds as ordered; he checks FBS every AM.  He said his sister helps him with low carbs and low sweets in diet.  Patient said he walks about every day for exercise.  He reported a FBS of 151 this morning. Discussed upcoming Endocrine appt, 2/19/25.  He voiced understanding and said his sister will take him.      Adult Patient Profile  Questions/Answers      Flowsheet Row Most Recent Value   Symptoms/Conditions Managed at Home diabetes, type 2   Barriers to Managing Health none   Diabetes Management Strategies blood glucose testing, insulin therapy, medication therapy, diet modification   Last A1C Result 11.0 (12-2-24)   Barriers to Taking Medication as Prescribed none   Source of Information patient, health record   Equipment Currently Used at Home glucometer   Primary Source of Support/Comfort sibling(s)   People in Home sibling(s)   Current Living Arrangements home   Resource/Environmental Concerns none        Social Work Assessment  Questions/Answers      Flowsheet Row Most Recent Value   People in Home sibling(s)   Current Living Arrangements home   Functional Status Comments Patient stated he walks around okay without assistive device.  Said he has to be careful on steps, and uses a rail on steps that helps.   Equipment Currently Used at Home glucometer        Send Education  Questions/Answers      Flowsheet Row Most Recent Value   Annual Wellness Visit:  Patient Has Completed  [AWV was completed 5-21-24]        SDOH updated and reviewed with the patient during this program:  --     Food Insecurity:  No Food Insecurity (2/4/2025)    Hunger Vital Sign     Worried About Running Out of Food in the Last Year: Never true     Ran Out of Food in the Last Year: Never true      --     Transportation Needs: No Transportation Needs (2/4/2025)    PRAPARE - Transportation     Lack of Transportation (Medical): No     Lack of Transportation (Non-Medical): No       Education Documentation  blood glucose monitoring, taught by Lizzy Diaz, RN at 2/4/2025  5:01 PM.  Learner: Patient  Readiness: Acceptance  Method: Explanation  Response: Verbalizes Understanding    medication management, taught by Lizzy Diaz, RN at 2/4/2025  5:01 PM.  Learner: Patient  Readiness: Acceptance  Method: Explanation  Response: Verbalizes Understanding    physical activity benefits, taught by Lizzy Diaz, RN at 2/4/2025  5:01 PM.  Learner: Patient  Readiness: Acceptance  Method: Explanation  Response: Verbalizes Understanding    healthy eating, taught by Lizzy Diaz, RN at 2/4/2025  5:01 PM.  Learner: Patient  Readiness: Acceptance  Method: Explanation  Response: Verbalizes Understanding          Lizzy WANG  Ambulatory Case Management    2/4/2025, 17:02 EST

## 2025-02-04 NOTE — PLAN OF CARE
Problem: Diabetes Type 2  Goal: Monitor My Blood Sugar  Intervention: My Blood Sugar Management To Do List  Description:   Why is this important?  Checking your blood sugar (glucose) at home helps to keep it from getting very high or very low.  Writing the results in a diary or log, or using an lis, helps your diabetes care provider know how to care for you.  Your blood sugar (glucose) log should have the time, date and results.  Also write down the amount of insulin or other medicine that you take.    Flowsheets (Taken 2/4/2025 1656)  My Blood Sugar Management To Do List:   check blood sugar (glucose) at prescribed times   take the blood sugar (glucose) meter to all provider visits  Goal: Optimal Care Coordination of a Patient Experiencing Diabetes, Type 2  Intervention: Monitor and Manage Follow-Up for Comorbidities  Flowsheets (Taken 2/4/2025 1656)  Monitor and Manage Follow-Up for Comorbidities: referral to specialist arranged  Intervention: Optimize Functional Ability  Flowsheets (Taken 2/4/2025 1656)  Optimize Functional Ability:   activity or exercise based on tolerance and functional limitations encouraged   daily activity/exercise promoted

## 2025-02-07 ENCOUNTER — TELEPHONE (OUTPATIENT)
Dept: FAMILY MEDICINE CLINIC | Facility: CLINIC | Age: 75
End: 2025-02-07

## 2025-02-07 DIAGNOSIS — F51.01 PRIMARY INSOMNIA: ICD-10-CM

## 2025-02-07 RX ORDER — LORAZEPAM 2 MG/1
2 TABLET ORAL EVERY 6 HOURS PRN
Qty: 60 TABLET | Refills: 0 | Status: SHIPPED | OUTPATIENT
Start: 2025-02-07

## 2025-02-07 NOTE — TELEPHONE ENCOUNTER
Caller: Jose A Villanueva    Relationship: Self    Best call back number:  [phone]     Requested Prescriptions:   Requested Prescriptions     Pending Prescriptions Disp Refills    LORazepam (ATIVAN) 2 MG tablet 60 tablet 0     Sig: Take 1 tablet by mouth Every 6 (Six) Hours As Needed for Anxiety.        Pharmacy where request should be sent: [facility] - [facility] [address] - [phone]  - [phone]     Last office visit with prescribing clinician: 12/2/2024   Last telemedicine visit with prescribing clinician: Visit date not found   Next office visit with prescribing clinician: 3/6/2025     Additional details provided by patient: PATIENT REQUESTS A 30 DAY SUPPLY BECAUSE WE ARE ONLY SEND A QUANTITY OF 60 AT A TIME WITH DIRECTIONS OF TAKING ONE EVERY 6 HOURS PRN.  PLEASE REVIEW THE DOSAGE AND DIRECTIONS AND VERIFY IF CAN SEND A MONTH SUPPLY INSTEAD OF TWO WEEKS AT A TIME.    Does the patient have less than a 3 day supply:  [x] Yes  [] No    Would you like a call back once the refill request has been completed: [] Yes [x] No    If the office needs to give you a call back, can they leave a voicemail: [] Yes [x] No    Wendy Uriostegui Rep   02/07/25 11:35 EST

## 2025-02-07 NOTE — TELEPHONE ENCOUNTER
Caller: Jose A Villanueva    Relationship: Self    Best call back number: [phone]     What orders are you requesting (i.e. lab or imaging): CT SCAN FOR AORTIC ANEURYSM    In what timeframe would the patient need to come in: ASAP    Where will you receive your lab/imaging services: [facility]     Additional notes: HAD ECHO NEEDS CT NOW.  PLEASE PUT ORDER IN ASAP.

## 2025-02-07 NOTE — TELEPHONE ENCOUNTER
Patient states that he saw the AAA screen due and thought it was something wrong. Just refill medicine

## 2025-02-17 DIAGNOSIS — I35.0 AORTIC STENOSIS, MILD: ICD-10-CM

## 2025-02-24 DIAGNOSIS — F51.01 PRIMARY INSOMNIA: ICD-10-CM

## 2025-02-24 DIAGNOSIS — E11.43 TYPE 2 DIABETES MELLITUS WITH DIABETIC AUTONOMIC NEUROPATHY, WITHOUT LONG-TERM CURRENT USE OF INSULIN: ICD-10-CM

## 2025-02-24 RX ORDER — LORAZEPAM 2 MG/1
2 TABLET ORAL EVERY 6 HOURS PRN
Qty: 60 TABLET | Refills: 0 | Status: SHIPPED | OUTPATIENT
Start: 2025-02-24

## 2025-02-24 RX ORDER — PREGABALIN 150 MG/1
150 CAPSULE ORAL EVERY 12 HOURS
Qty: 60 CAPSULE | Refills: 2 | Status: SHIPPED | OUTPATIENT
Start: 2025-02-24

## 2025-03-06 ENCOUNTER — OFFICE VISIT (OUTPATIENT)
Dept: FAMILY MEDICINE CLINIC | Facility: CLINIC | Age: 75
End: 2025-03-06
Payer: MEDICARE

## 2025-03-06 VITALS
RESPIRATION RATE: 16 BRPM | OXYGEN SATURATION: 93 % | DIASTOLIC BLOOD PRESSURE: 72 MMHG | WEIGHT: 282 LBS | TEMPERATURE: 98.2 F | BODY MASS INDEX: 40.37 KG/M2 | HEIGHT: 70 IN | SYSTOLIC BLOOD PRESSURE: 118 MMHG | HEART RATE: 86 BPM

## 2025-03-06 DIAGNOSIS — Z23 ENCOUNTER FOR IMMUNIZATION: ICD-10-CM

## 2025-03-06 DIAGNOSIS — E11.43 TYPE 2 DIABETES MELLITUS WITH DIABETIC AUTONOMIC NEUROPATHY, WITHOUT LONG-TERM CURRENT USE OF INSULIN: ICD-10-CM

## 2025-03-06 DIAGNOSIS — M54.16 LUMBAR RADICULOPATHY: ICD-10-CM

## 2025-03-06 DIAGNOSIS — E66.01 MORBID (SEVERE) OBESITY DUE TO EXCESS CALORIES: ICD-10-CM

## 2025-03-06 DIAGNOSIS — N18.31 STAGE 3A CHRONIC KIDNEY DISEASE: ICD-10-CM

## 2025-03-06 DIAGNOSIS — I25.10 CORONARY ARTERY DISEASE INVOLVING NATIVE CORONARY ARTERY OF NATIVE HEART WITHOUT ANGINA PECTORIS: Chronic | ICD-10-CM

## 2025-03-06 DIAGNOSIS — K21.9 GASTROESOPHAGEAL REFLUX DISEASE WITHOUT ESOPHAGITIS: ICD-10-CM

## 2025-03-06 DIAGNOSIS — F51.01 PRIMARY INSOMNIA: ICD-10-CM

## 2025-03-06 DIAGNOSIS — I38 VALVULAR HEART DISEASE: Chronic | ICD-10-CM

## 2025-03-06 DIAGNOSIS — E11.65 TYPE 2 DIABETES MELLITUS WITH HYPERGLYCEMIA, WITH LONG-TERM CURRENT USE OF INSULIN: Primary | ICD-10-CM

## 2025-03-06 DIAGNOSIS — R39.12 BENIGN PROSTATIC HYPERPLASIA WITH WEAK URINARY STREAM: ICD-10-CM

## 2025-03-06 DIAGNOSIS — N40.1 BENIGN PROSTATIC HYPERPLASIA WITH WEAK URINARY STREAM: ICD-10-CM

## 2025-03-06 DIAGNOSIS — Z79.899 HIGH RISK MEDICATION USE: ICD-10-CM

## 2025-03-06 DIAGNOSIS — I10 PRIMARY HYPERTENSION: ICD-10-CM

## 2025-03-06 DIAGNOSIS — G47.33 OBSTRUCTIVE SLEEP APNEA: ICD-10-CM

## 2025-03-06 DIAGNOSIS — M48.062 SPINAL STENOSIS, LUMBAR REGION, WITH NEUROGENIC CLAUDICATION: ICD-10-CM

## 2025-03-06 DIAGNOSIS — E78.2 MIXED HYPERLIPIDEMIA: ICD-10-CM

## 2025-03-06 DIAGNOSIS — Z79.4 TYPE 2 DIABETES MELLITUS WITH HYPERGLYCEMIA, WITH LONG-TERM CURRENT USE OF INSULIN: Primary | ICD-10-CM

## 2025-03-06 LAB
EXPIRATION DATE: ABNORMAL
EXPIRATION DATE: NORMAL
HBA1C MFR BLD: 9.7 % (ref 4.5–5.7)
Lab: ABNORMAL
Lab: NORMAL
POC ALBUMIN, URINE: 10 MG/L
POC CREATININE, URINE: 50 MG/DL
POC URINE ALB/CREA RATIO: <30

## 2025-03-06 RX ORDER — LORAZEPAM 2 MG/1
TABLET ORAL
Qty: 90 TABLET | Refills: 0 | Status: SHIPPED | OUTPATIENT
Start: 2025-03-06

## 2025-03-06 RX ORDER — PANTOPRAZOLE SODIUM 40 MG/1
40 TABLET, DELAYED RELEASE ORAL DAILY
Qty: 90 TABLET | Refills: 1 | Status: SHIPPED | OUTPATIENT
Start: 2025-03-06

## 2025-03-06 RX ORDER — PREGABALIN 150 MG/1
150 CAPSULE ORAL EVERY 12 HOURS
Qty: 180 CAPSULE | Refills: 0 | Status: SHIPPED | OUTPATIENT
Start: 2025-03-06 | End: 2025-06-04

## 2025-03-06 NOTE — PROGRESS NOTES
Office Note     Name: Jose A Villanueva    : 1950     MRN: [id number]     Chief Complaint  3 month follow up DM    Subjective     History of Present Illness:  Jose A Villanueva is a 74 y.o. male who presents today for follow-up on chronic conditions that include BPH, CAD, GERD, hyperlipidemia, lumbar radiculopathy, obesity, PIPPA, hypertension, insomnia, CKD stage III, type 2 diabetes and valvular heart disease.  Patient's blood pressure very well-controlled in office today, 118/72.  Patient's A1c has improved, however still significantly elevated.  His last visit 3 months ago his A1c was noted to be 11%, today coming in at 9.7%.  States that he has been checking his a.m. fasting glucose daily with an average anywhere between 190-210.  Patient was referred to endocrinology, however has had to reschedule his appointment due to weather and transportation.  Currently utilizing Ozempic 0.5 mg weekly dosing, however it is causing heartburn.  He also utilizes glipizide 10 mg twice daily, Lantus 80 units nightly.  Patient states he has been having difficulty cutting his own toenails in the recent months.  He is also noted to have some callusing and dry skin.  Patient suffers from lumbar radiculopathy as well as polyneuropathy, finding continued benefits of twice daily Lyrica dosing.  He has no new complaints or concerns today    Review of Systems   Constitutional:  Negative for unexpected weight loss.   Eyes:  Negative for blurred vision.   Endocrine: Positive for polyuria. Negative for polydipsia.   Neurological:  Positive for tremors and confusion. Negative for speech difficulty.       Objective     Past Medical History:   Diagnosis Date    Alcoholism     in remission    Allergic cerumenex    rash    Anxiety     Arthritis     Asthma 2015    sleep apnea/APAP    Benign prostatic hyperplasia  urgency    Cervical disc disorder 1986    Cholelithiasis removed     CKD (chronic kidney disease) 2023    Coronary  "artery disease involving native coronary artery of native heart without angina pectoris 2022    Depression     Diabetes mellitus     Erectile dysfunction 2015    BP meds?    GERD (gastroesophageal reflux disease)     Heart murmur 2004    R. carotid bruit     HL (hearing loss) hearing aids    Hyperlipidemia     Hypertension     Low back pain     casual/ occasional    Multiple closed fractures of ribs of left side 2022    Obesity have gastric band        Peripheral neuropathy     Substance abuse 1992    alcoholism    Thoracic disc disorder 1986    Troponin level elevated 2022    Formatting of this note might be different from the original.   Added automatically from request for surgery [id number]      Visual impairment glasses     Past Surgical History:   Procedure Laterality Date    BARIATRIC SURGERY  band     CARDIAC CATHETERIZATION      CHOLECYSTECTOMY      COLONOSCOPY      EPIDURAL BLOCK      by pain doctor    JOINT REPLACEMENT  2014    right hip    KNEE ARTHROSCOPY Bilateral     REPLACEMENT TOTAL HIP ONCOLOGIC Right     TONSILLECTOMY       Family History   Problem Relation Age of Onset    Arthritis Mother     Hypertension Mother     COPD Mother             Anxiety disorder Mother     Hearing loss Mother     Arthritis Father     Hypertension Father     Hyperlipidemia Father             Alcohol abuse Brother             Drug abuse Brother             Early death Brother         49 yo    Hyperlipidemia Sister     Hyperlipidemia Sister        Vital Signs  /72 (BP Location: Left arm, Patient Position: Sitting, Cuff Size: Adult)   Pulse 86   Temp 98.2 °F (36.8 °C) (Temporal)   Resp 16   Ht 177.8 cm (70\")   Wt 128 kg (282 lb)   SpO2 93%   BMI 40.46 kg/m²   Estimated body mass index is 40.46 kg/m² as calculated from the following:    Height as of this encounter: 177.8 cm (70\").    Weight as of this encounter: 128 kg (282 " lb).  Facility age limit for growth %marga is 20 years.    Physical Exam  Vitals reviewed.   Constitutional:       Appearance: Normal appearance.   HENT:      Head: Normocephalic and atraumatic.      Right Ear: Tympanic membrane, ear canal and external ear normal.      Left Ear: Tympanic membrane, ear canal and external ear normal.      Nose: Nose normal.      Mouth/Throat:      Mouth: Mucous membranes are moist.      Pharynx: Oropharynx is clear.   Eyes:      Conjunctiva/sclera: Conjunctivae normal.   Cardiovascular:      Rate and Rhythm: Normal rate and regular rhythm.      Pulses: Normal pulses.      Heart sounds: Normal heart sounds.   Pulmonary:      Effort: Pulmonary effort is normal.      Breath sounds: Normal breath sounds.   Abdominal:      Palpations: Abdomen is soft.   Musculoskeletal:         General: Normal range of motion.      Cervical back: Neck supple.   Skin:     General: Skin is warm and dry.      Capillary Refill: Capillary refill takes less than 2 seconds.   Neurological:      Mental Status: He is alert and oriented to person, place, and time.              POCT Results (if applicable):  Results for orders placed or performed in visit on 03/06/25   POC Glycosylated Hemoglobin (Hb A1C)    Collection Time: 03/06/25 10:50 AM    Specimen: Blood   Result Value Ref Range    Hemoglobin A1C 9.7 (A) 4.5 - 5.7 %    Lot Number [id number]     Expiration Date 11,122,028    POC Albumin/Creatinine Ratio Urine    Collection Time: 03/06/25 11:36 AM    Specimen: Urine   Result Value Ref Range    POC ALBUMIN, URINE 10 mg/L    POC CREATININE, URINE 50 mg/dL    POC Urine Albumin Creatinine Ratio <30 <30    Lot Number [id number]     Expiration Date 5,222,026             Assessment and Plan     Diagnoses and all orders for this visit:    1. Type 2 diabetes mellitus with hyperglycemia, with long-term current use of insulin (Primary)  Assessment & Plan:  Patient's A1c has improved, however still significantly  elevated.  His last visit 3 months ago his A1c was noted to be 11%, today coming in at 9.7%.  States that he has been checking his a.m. fasting glucose daily with an average anywhere between 190-210.  Patient was referred to endocrinology, however has had to reschedule his appointment due to weather and transportation.  Currently utilizing Ozempic 0.5 mg weekly dosing, however it is causing heartburn.  He also utilizes glipizide 10 mg twice daily, Lantus 80 units nightly.  In the past patient use metformin but was discontinued due to decreased renal function.  Patient has utilized oral Rybelsus in the past but it became a financial burden.  Patient does have interest in insulin pump, prompting referral to endocrinology, however due to transportation and weather has had to reschedule.  Is now set to see them on May 4    Orders:  -     POC Glycosylated Hemoglobin (Hb A1C)  -     POC Albumin/Creatinine Ratio Urine  -     Ambulatory Referral to Podiatry  -     Semaglutide, 1 MG/DOSE, (OZEMPIC) 2 MG/1.5ML solution pen-injector; Inject 1 mg under the skin into the appropriate area as directed 1 (One) Time Per Week.  Dispense: 1.5 mL; Refill: 3    2. Benign prostatic hyperplasia with weak urinary stream  Assessment & Plan:  Approximately one year ago patient had complaints of frequent nighttime urination, approximately 3-4 episodes. He also noted weak in urinary stream and urgency. He denies any feelings of urinary retention, hematuria or urinary obstruction. He was initiated on tamsulosin 0.5 mg nightly which she states has given him full resolution of his symptom       3. Coronary artery disease involving native coronary artery of native heart without angina pectoris  Assessment & Plan:  [facility] from 2/11/2023 revealed chronic total occlusion to the LAD extending from the ostium to the distal vessel and fed by collaterals from the RCA.  Mild nonobstructive CAD involving the proximal circumflex and proximal to mid RCA.    Patient denies chest pain, worsening shortness of breath or fatigue.  Patient denies any current issues with chest pain, worsening shortness of breath or fatigue.      4. Mixed hyperlipidemia  Assessment & Plan:  Currently utilizing atorvastatin 40 mg daily as well as fenofibrate 160 mg      5. Lumbar radiculopathy  Assessment & Plan:  Patient was followed and approved for neurosurgery prior to leaving [place].  MRI reviewed in office today showing L3-5 circumferential disc bulging, ligamentum flavum infolding and facet arthropathy contributing to left subarticular zone impingement of the traversing L5 nerve root, mild spinal canal stenosis and mild bilateral neural foraminal stenosis.  Was evaluated by neurosurgery at [facility] who declined surgery at this time, receiving benefit from pain management       6. Morbid (severe) obesity due to excess calories  Assessment & Plan:  Patient's (Body mass index is 40.46 kg/m².) indicates that they are morbidly/severely obese (BMI > 40 or > 35 with obesity - related health condition) with health conditions that include obstructive sleep apnea, hypertension, coronary heart disease, diabetes mellitus, dyslipidemias, and osteoarthritis . Weight is worsening. BMI  is above average; BMI management plan is completed. We discussed portion control and increasing exercise.       7. Obstructive sleep apnea  Assessment & Plan:  Patient reports better compliance with PAP uses after getting a new, smaller PAP mask       8. Primary hypertension  Assessment & Plan:  Blood pressure well-controlled in office today, 118/72.  Currently utilizing amlodipine 10 mg daily, HCTZ 25 mg daily and metoprolol 100 mg twice daily      9. Primary insomnia  Assessment & Plan:  Longstanding pattern of insomnia, he is utilizing lorazepam nightly with good benefit for a number of years.  He states that he is having some issues with his neuropathy in his feet worsening at bedtime but this will respond well to  extra dose of lorazepam.  He has experienced poor response in the past to medication such as trazodone, Ambien and Lunesta.  Reviewed Jack with satisfactory findings.  He has controlled substance agreement and up-to-date UDS on file     Orders:  -     LORazepam (ATIVAN) 2 MG tablet; Take one tablet by mouth three times daily as needed for anxiety  Dispense: 90 tablet; Refill: 0    10. Stage 3a chronic kidney disease  Assessment & Plan:  Patient has been followed by [facility] with a baseline creatinine noted to be 1.5-1.6 range, GFR 47 since labs last obtained 2 months ago showing ongoing stability.       11. Valvular heart disease  Assessment & Plan:  The aortic valve is trileaflet with moderate, diffuse calcification.     Aortic Valve: There is trace regurgitation. There is mild stenosis. The    peak gradient measures 33 mmHg. The mean gradient measures 18 mmHg. The    peak aortic velocity was measured in the apical view.     Mitral Valve: There is trace regurgitation.     Tricuspid Valve: There is trace regurgitation.       12. Encounter for immunization  -     Pneumococcal Conjugate Vaccine 20-Valent (PCV20)    13. High risk medication use  -     Urine Drug Screen - Urine, Clean Catch; Future  -     Urine Drug Screen - Urine, Clean Catch    14. Gastroesophageal reflux disease without esophagitis  Assessment & Plan:  Patient's insurance stopped covering pantoprazole, subsequently he has developed heartburn, unsure if it is just recurrence of GERD or if it is due to his Ozempic dosing.  Will resend PA for pantoprazole    Orders:  -     pantoprazole (PROTONIX) 40 MG EC tablet; Take 1 tablet by mouth Daily.  Dispense: 90 tablet; Refill: 1    15. Type 2 diabetes mellitus with diabetic autonomic neuropathy, without long-term current use of insulin  -     pregabalin (LYRICA) 150 MG capsule; Take 1 capsule by mouth Every 12 (Twelve) Hours for 90 days.  Dispense: 180 capsule; Refill: 0    16.  Spinal stenosis, lumbar region, with neurogenic claudication  Assessment & Plan:  He has been evaluated by neurosurgery and pain management for his spinal stenosis. Neurosurgery wanted to avoid surgical intervention due to his weight and uncontrolled A1c, per their report based off of his MRI he does have some advanced level degenerative changes in the L3-4 facets. He was subsequently referred to physical therapy which has helped his pain and mobility issues significantly.                  Follow Up  Return in about 3 months (around 6/6/2025) for Medicare Wellness.    Radha Asher, APRN

## 2025-03-06 NOTE — ASSESSMENT & PLAN NOTE
Patient's A1c has improved, however still significantly elevated.  His last visit 3 months ago his A1c was noted to be 11%, today coming in at 9.7%.  States that he has been checking his a.m. fasting glucose daily with an average anywhere between 190-210.  Patient was referred to endocrinology, however has had to reschedule his appointment due to weather and transportation.  Currently utilizing Ozempic 0.5 mg weekly dosing, however it is causing heartburn.  He also utilizes glipizide 10 mg twice daily, Lantus 80 units nightly.  In the past patient use metformin but was discontinued due to decreased renal function.  Patient has utilized oral Rybelsus in the past but it became a financial burden.  Patient does have interest in insulin pump, prompting referral to endocrinology, however due to transportation and weather has had to reschedule.  Is now set to see them on May 4

## 2025-03-06 NOTE — LETTER
[facility]  Vaccine Consent Form    Patient Name:  Jose A Villanueva  Patient :  1950     Vaccine(s) Ordered    Pneumococcal Conjugate Vaccine 20-Valent (PCV20)        Screening Checklist  The following questions should be completed prior to vaccination. If you answer “yes” to any question, it does not necessarily mean you should not be vaccinated. It just means we may need to clarify or ask more questions. If a question is unclear, please ask your healthcare provider to explain it.    Yes No   Any fever or moderate to severe illness today (mild illness and/or antibiotic treatment are not contraindications)?     Do you have a history of a serious reaction to any previous vaccinations, such as anaphylaxis, encephalopathy within 7 days, Guillain-Fisher syndrome within 6 weeks, seizure?     Have you received any live vaccine(s) (e.g MMR, CECY) or any other vaccines in the last month (to ensure duplicate doses aren't given)?     Do you have an anaphylactic allergy to latex (DTaP, DTaP-IPV, Hep A, Hep B, MenB, RV, Td, Tdap), baker’s yeast (Hep B, HPV), polysorbates (RSV, nirsevimab, PCV 20, Rotavirrus, Tdap, Shingrix), or gelatin (CECY, MMR)?     Do you have an anaphylactic allergy to neomycin (Rabies, CECY, MMR, IPV, Hep A), polymyxin B (IPV), or streptomycin (IPV)?      Any cancer, leukemia, AIDS, or other immune system disorder? (CECY, MMR, RV)     Do you have a parent, brother, or sister with an immune system problem (if immune competence of vaccine recipient clinically verified, can proceed)? (MMR, CECY)     Any recent steroid treatments for >2 weeks, chemotherapy, or radiation treatment? (CECY, MMR)     Have you received antibody-containing blood transfusions or IVIG in the past 11 months (recommended interval is dependent on product)? (MMR, CECY)     Have you taken antiviral drugs (acyclovir, famciclovir, valacyclovir for CECY) in the last 24 or 48 hours, respectively?      Are you pregnant or planning to become  "pregnant within 1 month? (CECY, MMR, HPV, IPV, MenB, Abrexvy; For Hep B- refer to Engerix-B; For RSV - Abrysvo is indicated for 32-36 weeks of pregnancy from September to January)     For infants, have you ever been told your child has had intussusception or a medical emergency involving obstruction of the intestine (Rotavirus)? If not for an infant, can skip this question.         *Ordering Physicians/APC should be consulted if \"yes\" is checked by the patient or guardian above.  I have received, read, and understand the Vaccine Information Statement (VIS) for each vaccine ordered.  I have considered my or my child's health status as well as the health status of my close contacts.  I have taken the opportunity to discuss my vaccine questions with my or my child's health care provider.   I have requested that the ordered vaccine(s) be given to me or my child.  I understand the benefits and risks of the vaccines.  I understand that I should remain in the clinic for 15 minutes after receiving the vaccine(s).  _________________________________________________________  Signature of Patient or Parent/Legal Guardian ____________________  Date   "

## 2025-03-07 LAB
AMPHETAMINES UR QL SCN: NEGATIVE NG/ML
BARBITURATES UR QL SCN: NEGATIVE NG/ML
BENZODIAZ UR QL SCN: NEGATIVE NG/ML
BZE UR QL SCN: NEGATIVE NG/ML
CANNABINOIDS UR QL SCN: NEGATIVE NG/ML
CREAT UR-MCNC: 59.8 MG/DL (ref 20–300)
LABORATORY COMMENT REPORT: NORMAL
METHADONE UR QL SCN: NEGATIVE NG/ML
OPIATES UR QL SCN: NEGATIVE NG/ML
OXYCODONE+OXYMORPHONE UR QL SCN: NEGATIVE NG/ML
PCP UR QL: NEGATIVE NG/ML
PH UR: 7.4 [PH] (ref 4.5–8.9)
PROPOXYPH UR QL SCN: NEGATIVE NG/ML

## 2025-03-07 NOTE — ASSESSMENT & PLAN NOTE
Patient's (Body mass index is 40.46 kg/m².) indicates that they are morbidly/severely obese (BMI > 40 or > 35 with obesity - related health condition) with health conditions that include obstructive sleep apnea, hypertension, coronary heart disease, diabetes mellitus, dyslipidemias, and osteoarthritis . Weight is worsening. BMI  is above average; BMI management plan is completed. We discussed portion control and increasing exercise.

## 2025-03-07 NOTE — ASSESSMENT & PLAN NOTE
Patient was followed and approved for neurosurgery prior to leaving [place].  MRI reviewed in office today showing L3-5 circumferential disc bulging, ligamentum flavum infolding and facet arthropathy contributing to left subarticular zone impingement of the traversing L5 nerve root, mild spinal canal stenosis and mild bilateral neural foraminal stenosis.  Was evaluated by neurosurgery at [facility] [place] who declined surgery at this time, receiving benefit from pain management

## 2025-03-07 NOTE — ASSESSMENT & PLAN NOTE
Patient has been followed by [facility] with a baseline creatinine noted to be 1.5-1.6 range, GFR 47 since labs last obtained 2 months ago showing ongoing stability.

## 2025-03-07 NOTE — ASSESSMENT & PLAN NOTE
Patient's insurance stopped covering pantoprazole, subsequently he has developed heartburn, unsure if it is just recurrence of GERD or if it is due to his Ozempic dosing.  Will resend PA for pantoprazole

## 2025-03-07 NOTE — ASSESSMENT & PLAN NOTE
Approximately one year ago patient had complaints of frequent nighttime urination, approximately 3-4 episodes. He also noted weak in urinary stream and urgency. He denies any feelings of urinary retention, hematuria or urinary obstruction. He was initiated on tamsulosin 0.5 mg nightly which she states has given him full resolution of his symptom

## 2025-03-07 NOTE — ASSESSMENT & PLAN NOTE
Blood pressure well-controlled in office today, 118/72.  Currently utilizing amlodipine 10 mg daily, HCTZ 25 mg daily and metoprolol 100 mg twice daily

## 2025-03-10 ENCOUNTER — TELEPHONE (OUTPATIENT)
Dept: FAMILY MEDICINE CLINIC | Facility: CLINIC | Age: 75
End: 2025-03-10
Payer: MEDICARE

## 2025-03-10 NOTE — LETTER
Jury Duty    3/10/2025    To Whom It May Concern:    Jose A Villanueva is currently a patient under my medical care.  The patient's medical condition makes serving on jury duty inadvisable at this time.  Please excuse them from serving.  If you require additional information please do not hesitate to contact my office.      Sincerely,      Radha Asher, BRENDA

## 2025-03-10 NOTE — TELEPHONE ENCOUNTER
Caller: Jose A Villanueva    Relationship: Self    Best call back number: [phone]     What form or medical record are you requesting: LETTER FOR THE COURT EXCUSING PATIENT FROM JURY DUTY    Who is requesting this form or medical record from you: THE COURT SYSTEM    How would you like to receive the form or medical records (pick-up, mail, fax): MAIL  If mail, what is the address:   [address] [place] [id number]     Timeframe paperwork needed: PATIENT HAS JURY DUTY IN APRIL

## 2025-03-21 ENCOUNTER — PATIENT OUTREACH (OUTPATIENT)
Dept: CASE MANAGEMENT | Facility: OTHER | Age: 75
End: 2025-03-21
Payer: MEDICARE

## 2025-03-21 NOTE — OUTREACH NOTE
AMBULATORY CASE MANAGEMENT NOTE    Names and Relationships of Patient/Support Persons: Contact: Jose A Villanueva; Relationship: Self -     Patient Outreach    RN-ACM called patient to follow up on Diabetes management.  Patient stated this was not a good time to talk for him, he was expecting a call; he asked if RN-ACM would call him back on Monday before 12 noon.      Lizzy WANG  Ambulatory Case Management    3/21/2025, 14:31 EDT

## 2025-03-27 ENCOUNTER — PATIENT OUTREACH (OUTPATIENT)
Dept: CASE MANAGEMENT | Facility: OTHER | Age: 75
End: 2025-03-27
Payer: MEDICARE

## 2025-03-27 NOTE — OUTREACH NOTE
AMBULATORY CASE MANAGEMENT NOTE    Names and Relationships of Patient/Support Persons: Contact: Jose A Villanueva; Relationship: Self -     Patient Outreach    Patient returned call to RN-ACM after missing previous call today; he left voicemail for RN-NIYAM.   RN-ACM called patient back regarding his diabetes management. Patient stated his Blood Sugar (BS) has been doing better; it has been lower than it was.  Patient voiced compliance with checking his BS every morning.  He reported FBS this AM as 110, and yesterday's FBS was 89.  Patient said he continues to take the Ozempic 0.5mg every week, the Lantus insulin 80 mg every evening, the Glipizide 10mg PO BID.  Patient stated to be compliant with his medications daily as ordered.  Discussed the value of diabetic diet and exercise in BS management. Reviewed upcoming appts, with PCP in June and with Endocrine in May.  Patient voiced agreement and understanding with plans to be at these appts.    Patient stated his breathing is doing okay; he is sleeping at night with his CPAP and it has really helped him.    No other issues or concerns voiced.    Lizzy WANG  Ambulatory Case Management    3/27/2025, 13:56 EDT

## 2025-03-27 NOTE — PLAN OF CARE
Problem: Diabetes Type 2  Goal: Protect Myself From Diabetes Complications  Outcome: Progressing  Intervention: My Protect Myself From Diabetes Complications To Do List  Description:   Why is this important?  Having diabetes puts you at risk for complications, such as kidney disease, heart disease, nerve damage and eye or dental problems.  You can manage your risk by making healthy lifestyle choices and getting regular checkups.    Flowsheets (Taken 3/27/2025 1352)  My Protect Myself From Diabetes Complications To Do List: (Patient said he has an appt with Podiatry to examine his feet and help with toenail trim.) get a foot exam at least once per year  Goal: Monitor My Blood Sugar  Outcome: Progressing  Intervention: My Blood Sugar Management To Do List  Description:   Why is this important?  Checking your blood sugar (glucose) at home helps to keep it from getting very high or very low.  Writing the results in a diary or log, or using an lis, helps your diabetes care provider know how to care for you.  Your blood sugar (glucose) log should have the time, date and results.  Also write down the amount of insulin or other medicine that you take.    Flowsheets (Taken 3/27/2025 1352)  My Blood Sugar Management To Do List:   check blood sugar (glucose) at prescribed times   take the blood sugar (glucose) meter to all provider visits  Goal: Perform Foot Care  Outcome: Progressing  Intervention: My Foot Care To Do List  Description:   Why is this important?  Good foot care is very important when you have diabetes.  There are many things you can do to keep your feet healthy and catch a problem early.    Flowsheets (Taken 3/27/2025 1352)  My Foot Care To Do List: check feet daily for cuts, sores or redness  Goal: Optimal Care Coordination of a Patient Experiencing Diabetes, Type 2  Outcome: Progressing  Intervention: Monitor and Manage Follow-Up for Comorbidities  Flowsheets (Taken 3/27/2025 1352)  Monitor and Manage  Follow-Up for Comorbidities:   activity based on tolerance and functional limitations encouraged   completion of annual foot exam encouraged      normal...

## 2025-04-03 DIAGNOSIS — F51.01 PRIMARY INSOMNIA: ICD-10-CM

## 2025-04-03 DIAGNOSIS — E11.43 TYPE 2 DIABETES MELLITUS WITH DIABETIC AUTONOMIC NEUROPATHY, WITHOUT LONG-TERM CURRENT USE OF INSULIN: ICD-10-CM

## 2025-04-03 RX ORDER — PREGABALIN 150 MG/1
150 CAPSULE ORAL EVERY 12 HOURS
Qty: 180 CAPSULE | Refills: 0 | Status: SHIPPED | OUTPATIENT
Start: 2025-04-03 | End: 2025-07-02

## 2025-04-03 RX ORDER — LORAZEPAM 2 MG/1
TABLET ORAL
Qty: 90 TABLET | Refills: 0 | Status: SHIPPED | OUTPATIENT
Start: 2025-04-03

## 2025-04-03 NOTE — TELEPHONE ENCOUNTER
Caller: Jose A Villanueva    Relationship: Self    Best call back number: [phone]     Requested Prescriptions:   Requested Prescriptions     Pending Prescriptions Disp Refills    LORazepam (ATIVAN) 2 MG tablet 90 tablet 0     Sig: Take one tablet by mouth three times daily as needed for anxiety    pregabalin (LYRICA) 150 MG capsule 180 capsule 0     Sig: Take 1 capsule by mouth Every 12 (Twelve) Hours for 90 days.        Pharmacy where request should be sent: [id number] [place] [facility] [address] AT [address] [phone] [facility] [phone] FX     Last office visit with prescribing clinician: 3/6/2025   Last telemedicine visit with prescribing clinician: Visit date not found   Next office visit with prescribing clinician: 6/6/2025     Additional details provided by patient: PT HAS 2 DAYS MEDICATION LEFT.    Does the patient have less than a 3 day supply:  [x] Yes  [] No    Would you like a call back once the refill request has been completed: [] Yes [x] No    If the office needs to give you a call back, can they leave a voicemail: [] Yes [x] No    Wendy Ruvalcaba Rep   04/03/25 08:56 EDT

## 2025-04-03 NOTE — TELEPHONE ENCOUNTER
Last visit on 03/06/2025  Next visit 06/06/2025  CSA is good until 03/06/2026  UDS is good until 03/06/2026

## 2025-04-25 ENCOUNTER — PATIENT OUTREACH (OUTPATIENT)
Dept: CASE MANAGEMENT | Facility: OTHER | Age: 75
End: 2025-04-25
Payer: MEDICARE

## 2025-04-25 NOTE — OUTREACH NOTE
AMBULATORY CASE MANAGEMENT NOTE    Names and Relationships of Patient/Support Persons: Contact: Jose A Villanueva; Relationship: Self -     Patient Outreach    RN-MAE called patient to follow up regarding diabetes management.  Patient stated his Blood Sugar has been doing better.  He reported that his BS this AM was 111.  Patient voiced compliance with medications daily as directed, stating he is now taking 1 mg of Ozempic injection every week, in addition to his daily insulin and Glipizide.  Patient stated he has lost some weight and feels better.  He said he is exercising by walking and trying to eat low carbs, low sugar diet.  Patient said his sister helps him with the diet.  Discussed s/sx of hypo and hyperglycemia.  Patient voiced understanding and that he knows  what to do for each.  Patient talked about his neuropathy pain in his feet.  Reviewed upcoming appts: Endocrine on 5/14/25 and PCP on 6/6/25.  Patient voiced agreement and plans to be there.  No other concerns voiced.    Lizzy WANG  Ambulatory Case Management    4/25/2025, 14:58 EDT

## 2025-04-25 NOTE — PLAN OF CARE
Problem: Diabetes Type 2  Goal: Protect Myself From Diabetes Complications  Outcome: Progressing  Intervention: My Protect Myself From Diabetes Complications To Do List  Description:   Why is this important?  Having diabetes puts you at risk for complications, such as kidney disease, heart disease, nerve damage and eye or dental problems.  You can manage your risk by making healthy lifestyle choices and getting regular checkups.    Flowsheets (Taken 4/25/2025 1456)  My Protect Myself From Diabetes Complications To Do List: manage my weight  Goal: Monitor My Blood Sugar  Outcome: Progressing  Intervention: My Blood Sugar Management To Do List  Description:   Why is this important?  Checking your blood sugar (glucose) at home helps to keep it from getting very high or very low.  Writing the results in a diary or log, or using an lis, helps your diabetes care provider know how to care for you.  Your blood sugar (glucose) log should have the time, date and results.  Also write down the amount of insulin or other medicine that you take.    Flowsheets (Taken 4/25/2025 1456)  My Blood Sugar Management To Do List:   check blood sugar (glucose) at prescribed times   take the blood sugar (glucose) meter to all provider visits  Goal: Perform Foot Care  Outcome: Progressing  Intervention: My Foot Care To Do List  Description:   Why is this important?  Good foot care is very important when you have diabetes.  There are many things you can do to keep your feet healthy and catch a problem early.    Flowsheets (Taken 4/25/2025 1456)  My Foot Care To Do List:   check feet daily for cuts, sores or redness   keep feet up while sitting  Goal: Optimal Care Coordination of a Patient Experiencing Diabetes, Type 2  Outcome: Progressing  Intervention: Monitor and Manage Follow-Up for Comorbidities  Flowsheets (Taken 4/25/2025 1456)  Monitor and Manage Follow-Up for Comorbidities:   activity based on tolerance and functional limitations  encouraged   completion of annual foot exam encouraged  Intervention: Optimize Functional Ability  Flowsheets (Taken 4/25/2025 9123)  Optimize Functional Ability:   activity or exercise based on tolerance and functional limitations encouraged   daily activity/exercise promoted   self-care encouraged

## 2025-04-28 DIAGNOSIS — F51.01 PRIMARY INSOMNIA: ICD-10-CM

## 2025-04-28 RX ORDER — LORAZEPAM 2 MG/1
TABLET ORAL
Qty: 90 TABLET | Refills: 0 | Status: SHIPPED | OUTPATIENT
Start: 2025-04-28

## 2025-04-28 NOTE — TELEPHONE ENCOUNTER
CSA and appointment up to date    From: Vicki Mendez  To: Chanel Trejo MD  Sent: 6/21/2018 5:08 PM CDT  Subject: Non-Urgent Oneida Linares,    Today is day #4 on 6 mg of budesonide and I'm experiencing a minimum of 6 episodes of diarrhea. Please advise.

## 2025-04-28 NOTE — TELEPHONE ENCOUNTER
Caller: Jose A Villanueva    Relationship: Self    Best call back number: [phone]     Requested Prescriptions:   Requested Prescriptions     Pending Prescriptions Disp Refills    LORazepam (ATIVAN) 2 MG tablet 90 tablet 0     Sig: Take one tablet by mouth three times daily as needed for anxiety        Pharmacy where request should be sent: [id number] [place] - [address] [address] [address] [phone] [facility] [phone] FX     Last office visit with prescribing clinician: 3/6/2025   Last telemedicine visit with prescribing clinician: Visit date not found   Next office visit with prescribing clinician: 6/6/2025     Additional details provided by patient: ALMOST OUT. NEED REFILL

## 2025-05-09 ENCOUNTER — EXTERNAL PBMM DATA (OUTPATIENT)
Dept: PHARMACY | Facility: OTHER | Age: 75
End: 2025-05-09
Payer: MEDICARE

## 2025-05-14 ENCOUNTER — OFFICE VISIT (OUTPATIENT)
Dept: ENDOCRINOLOGY | Facility: CLINIC | Age: 75
End: 2025-05-14
Payer: MEDICARE

## 2025-05-14 VITALS
BODY MASS INDEX: 40.57 KG/M2 | HEIGHT: 70 IN | WEIGHT: 283.4 LBS | SYSTOLIC BLOOD PRESSURE: 134 MMHG | HEART RATE: 83 BPM | OXYGEN SATURATION: 98 % | DIASTOLIC BLOOD PRESSURE: 78 MMHG

## 2025-05-14 DIAGNOSIS — E78.2 MIXED HYPERLIPIDEMIA: ICD-10-CM

## 2025-05-14 DIAGNOSIS — Z79.4 TYPE 2 DIABETES MELLITUS WITH HYPERGLYCEMIA, WITH LONG-TERM CURRENT USE OF INSULIN: Primary | ICD-10-CM

## 2025-05-14 DIAGNOSIS — I10 PRIMARY HYPERTENSION: ICD-10-CM

## 2025-05-14 DIAGNOSIS — E11.65 TYPE 2 DIABETES MELLITUS WITH HYPERGLYCEMIA, WITH LONG-TERM CURRENT USE OF INSULIN: Primary | ICD-10-CM

## 2025-05-14 DIAGNOSIS — I25.10 CORONARY ARTERY DISEASE INVOLVING NATIVE CORONARY ARTERY OF NATIVE HEART WITHOUT ANGINA PECTORIS: Chronic | ICD-10-CM

## 2025-05-14 DIAGNOSIS — N18.31 STAGE 3A CHRONIC KIDNEY DISEASE: ICD-10-CM

## 2025-05-14 LAB
EXPIRATION DATE: ABNORMAL
GLUCOSE BLDC GLUCOMTR-MCNC: 186 MG/DL (ref 70–130)
Lab: ABNORMAL

## 2025-05-14 RX ORDER — MULTIPLE VITAMINS W/ MINERALS TAB 9MG-400MCG
1 TAB ORAL DAILY
COMMUNITY

## 2025-05-14 NOTE — PROGRESS NOTES
"     Office Note      Date: 2025  Patient Name: Jose A Villanueva  MRN: [id number]  : 1950    Chief Complaint   Patient presents with    Diabetes     Type II Diabetes       History of Present Illness:   Jose A Villanueva is a 74 y.o. male who presents for Diabetes type 2. Diagnosed in: . Treated in past with oral agents. Current treatments: glipizide, ozempic and basal insulin. Number of insulin shots per day: 1. Checks blood sugar 1 times a day. Has low blood sugar: no. Aspirin use: Yes. Statin use: Yes. ACE-I/ARB use: No -   .  Last eye exam: 2024.    Last A1c was 9.7%.  He has had DM education in the past.    Subjective      Diabetic Complications:  Eyes: No  Kidneys: Yes - CKD  Feet: Yes - neuropathy  Heart: Yes -      Diet and Exercise:  Meals per day: 3  Minutes of exercise per week: 0 mins.    Review of Systems:   Review of Systems   Constitutional: Negative.    HENT:  Positive for hearing loss, rhinorrhea and sneezing.    Eyes:  Positive for redness.   Respiratory:  Positive for cough.    Cardiovascular:  Positive for leg swelling.   Gastrointestinal: Negative.         Heartburn/Reflux   Endocrine: Negative.    Genitourinary:  Positive for urgency.   Musculoskeletal:  Positive for arthralgias, back pain, gait problem, neck pain and neck stiffness.   Skin: Negative.    Allergic/Immunologic: Positive for environmental allergies.   Hematological: Negative.    Psychiatric/Behavioral:  The patient is nervous/anxious.        The following portions of the patient's history were reviewed and updated as appropriate: allergies, current medications, past family history, past medical history, past social history, past surgical history, and problem list.    Objective     Visit Vitals  /78 (BP Location: Right arm, Patient Position: Sitting)   Pulse 83   Ht 177.8 cm (70\")   Wt 129 kg (283 lb 6.4 oz)   SpO2 98%   BMI 40.66 kg/m²       Physical Exam:  Physical Exam  Constitutional:       Appearance: He is obese. "   HENT:      Head: Normocephalic and atraumatic.   Eyes:      Extraocular Movements: Extraocular movements intact.      Conjunctiva/sclera: Conjunctivae normal.   Neck:      Thyroid: No thyroid mass, thyromegaly or thyroid tenderness.   Cardiovascular:      Rate and Rhythm: Normal rate and regular rhythm.      Pulses: Normal pulses.           Dorsalis pedis pulses are 2+ on the right side and 2+ on the left side.        Posterior tibial pulses are 2+ on the right side and 2+ on the left side.      Heart sounds: Normal heart sounds.   Pulmonary:      Effort: Pulmonary effort is normal.      Breath sounds: Normal breath sounds.   Abdominal:      General: Bowel sounds are normal.      Palpations: Abdomen is soft.   Musculoskeletal:      Cervical back: Normal range of motion and neck supple.      Right foot: Deformity present.      Left foot: Deformity present.   Feet:      Right foot:      Protective Sensation: 5 sites tested.  0 sites sensed.      Skin integrity: Erythema present.      Toenail Condition: Right toenails are normal.      Left foot:      Protective Sensation: 5 sites tested.  0 sites sensed.      Skin integrity: Erythema present.      Toenail Condition: Left toenails are normal.      Comments: Hammer toes  Lymphadenopathy:      Cervical: No cervical adenopathy.   Skin:     General: Skin is warm and dry.   Neurological:      General: No focal deficit present.      Mental Status: He is alert.   Psychiatric:         Mood and Affect: Mood normal.         Behavior: Behavior normal.         Thought Content: Thought content normal.         Judgment: Judgment normal.         Labs:    HbA1c  Hemoglobin A1C   Date Value Ref Range Status   03/06/2025 9.7 (A) 4.5 - 5.7 % Final   .    CMP  Lab Results   Component Value Date    GLUCOSE 72 06/20/2024    BUN 25 06/20/2024    CREATININE 1.55 (H) 06/20/2024    BCR 16 06/20/2024    K 4.0 06/20/2024    CO2 27 06/20/2024    CALCIUM 9.5 06/20/2024    AST 23 06/20/2024    ALT  "23 06/20/2024        Lipid Panel  Lab Results   Component Value Date    HDL Cholesterol 29 (L) 06/20/2024    LDL Chol Calc (NIH) 68 06/20/2024    Triglycerides 203 (H) 06/20/2024        TSH  Lab Results   Component Value Date    TSH 3.450 06/20/2024        Hemoglobin A1C  No components found for: \"HGBA1C\"     Microalbumin/Creatinine  No results found for: \"MALBCRERATI\"        Assessment / Plan      Assessment & Plan:  Diagnoses and all orders for this visit:    1. Type 2 diabetes mellitus with hyperglycemia, with long-term current use of insulin (Primary)  Assessment & Plan:  Diabetes is improving with treatment.   We discussed treatment options.  We discussed adding SGLT-2 inhibitor.  Potential s/e discussed.  Diabetes will be reassessed in 3 months.    Orders:  -     POC Glucose, Blood  -     Cancel: POC Glycosylated Hemoglobin (Hb A1C)    2. Primary hypertension  Assessment & Plan:  Hypertension is stable and controlled.  Continue current treatment regimen.  Blood pressure will be reassessed in 3 months.      3. Mixed hyperlipidemia  Assessment & Plan:  Continue statin and fenofibrate.  Plan to check lipids next visit.      4. Coronary artery disease involving native coronary artery of native heart without angina pectoris  Assessment & Plan:  Continue ASA, statin and ozempic.  We discussed use of SGLT-2 inhibitor.      5. Stage 3a chronic kidney disease  Assessment & Plan:  Continue ozempic.  We discussed adding SGLT-2 inhibitor.  Continue nephrology follow up.      Other orders  -     empagliflozin (Jardiance) 10 MG tablet tablet; Take 1 tablet by mouth Daily.  Dispense: 90 tablet; Refill: 3       Current Outpatient Medications   Medication Instructions    amLODIPine (NORVASC) 10 mg, Oral, Daily    aspirin 81 MG oral suspension No dose, route, or frequency recorded.    atorvastatin (LIPITOR) 40 mg, Oral, Daily    Blood Glucose Monitoring Suppl (D-Care Glucometer) w/Device kit 1 kit, Not Applicable, 3 Times Daily "    Continuous Glucose Sensor (Dexcom G6 Sensor) Not Applicable, Every 10 Days    empagliflozin (JARDIANCE) 10 mg, Oral, Daily    fenofibrate 160 MG tablet 1 tablet, Daily    glipizide (GLUCOTROL) 10 mg, Oral, 2 Times Daily Before Meals    glucose blood (True Metrix Blood Glucose Test) test strip TEST BLOOD SUGAR TWO TIMES DAILY    hydroCHLOROthiazide 25 mg, Oral, Daily    Insulin Glargine (Lantus SoloStar) 100 UNIT/ML injection pen INJECT 60 TO 80 UNITS UNDER THE SKIN ONE TIME DAILY AS DIRECTED    LORazepam (ATIVAN) 2 MG tablet Take one tablet by mouth three times daily as needed for anxiety    magnesium oxide (MAG-OX) 400 mg, 2 Times Daily    metoprolol tartrate (LOPRESSOR) 100 mg, Oral, Daily    multivitamin with minerals tablet tablet 1 tablet, Daily    nystatin (MYCOSTATIN) 224866 UNIT/GM cream Topical, See Admin Instructions, apply topically to the appropriate area as directed 2 times a day    olopatadine (PATANOL) 0.1 % ophthalmic solution 1 drop, Both Eyes, 2 Times Daily    pantoprazole (PROTONIX) 40 mg, Oral, Daily    pregabalin (LYRICA) 150 mg, Oral, Every 12 Hours    Semaglutide (1 MG/DOSE) (OZEMPIC) 1 mg, Subcutaneous, Weekly    sertraline (ZOLOFT) 50 mg, Oral, Daily    tamsulosin (FLOMAX) 0.4 mg, Oral, Daily    tiZANidine (ZANAFLEX) 4 MG tablet TAKE 1 TABLET AT NIGHT AS NEEDED FOR MUSCLE SPASM(S)      Return in about 3 months (around 8/14/2025) for Recheck with A1c, CMP, lipid, TSH, microalbumin - ok to schedule with PA.    Electronically signed by: Abhay Baker MD  05/14/2025

## 2025-05-14 NOTE — ASSESSMENT & PLAN NOTE
Diabetes is improving with treatment.   We discussed treatment options.  We discussed adding SGLT-2 inhibitor.  Potential s/e discussed.  Diabetes will be reassessed in 3 months.

## 2025-05-21 DIAGNOSIS — K21.9 GASTROESOPHAGEAL REFLUX DISEASE WITHOUT ESOPHAGITIS: ICD-10-CM

## 2025-05-21 RX ORDER — PANTOPRAZOLE SODIUM 40 MG/1
40 TABLET, DELAYED RELEASE ORAL DAILY
Qty: 90 TABLET | Refills: 1 | Status: SHIPPED | OUTPATIENT
Start: 2025-05-21

## 2025-05-21 NOTE — TELEPHONE ENCOUNTER
Caller: GABBYALISA - [facility]    Relationship:     Best call back number: [phone]     Requested Prescriptions:   Requested Prescriptions     Pending Prescriptions Disp Refills    pantoprazole (PROTONIX) 40 MG EC tablet 90 tablet 1     Sig: Take 1 tablet by mouth Daily.        Pharmacy where request should be sent: [facility] - [facility] [address] - [phone]  - [phone] FX     Last office visit with prescribing clinician: 3/6/2025   Last telemedicine visit with prescribing clinician: Visit date not found   Next office visit with prescribing clinician: 6/6/2025     Additional details provided by patient: PT IS OUT OF MEDICATION.    Does the patient have less than a 3 day supply:  [x] Yes  [] No    Would you like a call back once the refill request has been completed: [] Yes [x] No    If the office needs to give you a call back, can they leave a voicemail: [] Yes [x] No    Wendy Ruvalcaba Rep   05/21/25 08:48 EDT

## 2025-05-23 ENCOUNTER — PATIENT OUTREACH (OUTPATIENT)
Dept: CASE MANAGEMENT | Facility: OTHER | Age: 75
End: 2025-05-23
Payer: MEDICARE

## 2025-05-23 NOTE — OUTREACH NOTE
AMBULATORY CASE MANAGEMENT NOTE    Names and Relationships of Patient/Support Persons: Contact: Jose A Villanueva; Relationship: Self -     Patient Outreach    RN-MAE called patient to follow up regarding diabetes management.  Patient stated he saw his Endocrinologist 5/14/25, and was started on a new medication, Jardiance.  Patient voiced compliance with medications as ordered each day.  Reviewed DM education.  Patient stated he is monitoring his BS every AM before medications; BS running between 140-200.  Patient denied any abnormal symptoms.  He stated he is dealing with some seasonal allergies, but is used to this. Patient voiced being active and mobile.  He reported his breathing is okay; he is using his CPAP every night. Reviewed next PCP appt, 6/6/25.  Patient voiced agreement with plans to be there.  No questions or concerns voiced at this time.     Lizzy WANG  Ambulatory Case Management    5/23/2025, 15:59 EDT

## 2025-05-23 NOTE — PLAN OF CARE
Problem: Diabetes Type 2  Goal: Monitor My Blood Sugar  Outcome: Progressing  Intervention: My Blood Sugar Management To Do List  Description:   Why is this important?  Checking your blood sugar (glucose) at home helps to keep it from getting very high or very low.  Writing the results in a diary or log, or using an lis, helps your diabetes care provider know how to care for you.  Your blood sugar (glucose) log should have the time, date and results.  Also write down the amount of insulin or other medicine that you take.    Flowsheets (Taken 5/23/2025 2508)  My Blood Sugar Management To Do List:   take the blood sugar (glucose) log to all provider visits   check blood sugar (glucose) at prescribed times  Goal: Optimal Care Coordination of a Patient Experiencing Diabetes, Type 2  Outcome: Progressing  Intervention: Monitor and Manage Follow-Up for Comorbidities  Flowsheets (Taken 5/23/2025 6816)  Monitor and Manage Follow-Up for Comorbidities: activity based on tolerance and functional limitations encouraged

## 2025-05-27 DIAGNOSIS — F51.01 PRIMARY INSOMNIA: ICD-10-CM

## 2025-05-28 DIAGNOSIS — E11.43 TYPE 2 DIABETES MELLITUS WITH DIABETIC AUTONOMIC NEUROPATHY, WITHOUT LONG-TERM CURRENT USE OF INSULIN: ICD-10-CM

## 2025-05-28 RX ORDER — LORAZEPAM 2 MG/1
2 TABLET ORAL EVERY 6 HOURS PRN
Qty: 60 TABLET | Refills: 0 | Status: SHIPPED | OUTPATIENT
Start: 2025-05-28

## 2025-05-28 NOTE — TELEPHONE ENCOUNTER
Caller: Jose A Villanueva    Relationship: Self    Best call back number:   Telephone Information:   Mobile [phone]        Requested Prescriptions:   Requested Prescriptions     Pending Prescriptions Disp Refills    pregabalin (LYRICA) 150 MG capsule 180 capsule 0     Sig: Take 1 capsule by mouth Every 12 (Twelve) Hours for 90 days.        Pharmacy where request should be sent: [facility] - [facility] [address] - [phone]  - [phone]      Last office visit with prescribing clinician: 3/6/2025   Last telemedicine visit with prescribing clinician: Visit date not found   Next office visit with prescribing clinician: 6/6/2025     Additional details provided by patient:     Does the patient have less than a 3 day supply:  [] Yes  [x] No    Would you like a call back once the refill request has been completed: [] Yes [x] No    If the office needs to give you a call back, can they leave a voicemail: [] Yes [x] No    Wendy Chau Rep   05/28/25 13:20 EDT

## 2025-05-29 RX ORDER — PREGABALIN 150 MG/1
150 CAPSULE ORAL EVERY 12 HOURS
Qty: 180 CAPSULE | Refills: 0 | Status: SHIPPED | OUTPATIENT
Start: 2025-05-29 | End: 2025-08-27

## 2025-06-16 ENCOUNTER — TELEPHONE (OUTPATIENT)
Dept: FAMILY MEDICINE CLINIC | Facility: CLINIC | Age: 75
End: 2025-06-16
Payer: MEDICARE

## 2025-06-16 DIAGNOSIS — F51.01 PRIMARY INSOMNIA: ICD-10-CM

## 2025-06-16 RX ORDER — INSULIN GLARGINE 100 [IU]/ML
80 INJECTION, SOLUTION SUBCUTANEOUS NIGHTLY
Qty: 15 ML | Refills: 3 | Status: SHIPPED | OUTPATIENT
Start: 2025-06-16

## 2025-06-16 RX ORDER — LANCETS
EACH MISCELLANEOUS
Qty: 100 EACH | Refills: 2 | Status: SHIPPED | OUTPATIENT
Start: 2025-06-16

## 2025-06-16 RX ORDER — LORAZEPAM 2 MG/1
2 TABLET ORAL EVERY 6 HOURS PRN
Qty: 60 TABLET | Refills: 0 | Status: SHIPPED | OUTPATIENT
Start: 2025-06-16 | End: 2025-06-20

## 2025-06-16 NOTE — TELEPHONE ENCOUNTER
Caller: Jose A Villanueva    Relationship: Self    Best call back number: [phone]     What medication are you requesting: NEEDLES FOR LANTUS      Have you had these symptoms before:    [x] Yes  [] No    Have you been treated for these symptoms before:   [x] Yes  [] No    If a prescription is needed, what is your preferred pharmacy and phone number: [facility] - [facility] [address] - [phone] [facility] [phone] FX     Additional notes:

## 2025-06-16 NOTE — TELEPHONE ENCOUNTER
Called and left a message advising patient on 03/06/2025 tali sent in for the 1mg.    HUB can relay   denies

## 2025-06-16 NOTE — TELEPHONE ENCOUNTER
Caller: Jose A Villanueva    Relationship: Self    Best call back number: [phone]     Requested Prescriptions:   Requested Prescriptions     Pending Prescriptions Disp Refills    LORazepam (ATIVAN) 2 MG tablet 60 tablet 0     Sig: Take 1 tablet by mouth Every 6 (Six) Hours As Needed for Anxiety.    Insulin Glargine (Lantus SoloStar) 100 UNIT/ML injection pen 45 mL 3     Sig: INJECT 60 TO 80 UNITS UNDER THE SKIN ONE TIME DAILY AS DIRECTED        Pharmacy where request should be sent: [facility] - [place] [id number] - [phone]  - [phone] FX     Last office visit with prescribing clinician: 3/6/2025   Last telemedicine visit with prescribing clinician: Visit date not found   Next office visit with prescribing clinician: 6/26/2025     Additional details provided by patient:     Does the patient have less than a 3 day supply:  [x] Yes  [] No    Would you like a call back once the refill request has been completed: [] Yes [x] No    If the office needs to give you a call back, can they leave a voicemail: [] Yes [x] No    Wendy Looney   06/16/25 12:08 EDT

## 2025-06-16 NOTE — TELEPHONE ENCOUNTER
PATIENT HAS CALLED REQUESTING A CALL BACK TO GET CLARIFICATION ON DOSAGE FOR HIS OZEMPIC. PATIENT STATES HE THOUGHT HE WAS TO DO 1 MG INJECTIONS BUT PHARMACY ONLY SENDING ENOUGH MEDICATION TO DO 0.5 MG. PATIENT IS REQUESTING A CALL BACK FOR CLARIFICATION AND OR TO HAVE NEW PRESCRIPTION SENT IN TO REFLECT 1 MG DOSE.    CALL BACK NUMBER [phone]

## 2025-06-18 DIAGNOSIS — N40.1 BENIGN PROSTATIC HYPERPLASIA WITH WEAK URINARY STREAM: ICD-10-CM

## 2025-06-18 DIAGNOSIS — R39.12 BENIGN PROSTATIC HYPERPLASIA WITH WEAK URINARY STREAM: ICD-10-CM

## 2025-06-18 RX ORDER — TAMSULOSIN HYDROCHLORIDE 0.4 MG/1
1 CAPSULE ORAL DAILY
Qty: 90 CAPSULE | Refills: 3 | Status: SHIPPED | OUTPATIENT
Start: 2025-06-18

## 2025-06-20 DIAGNOSIS — F51.01 PRIMARY INSOMNIA: ICD-10-CM

## 2025-06-20 RX ORDER — LORAZEPAM 2 MG/1
TABLET ORAL
Qty: 60 TABLET | Refills: 0 | Status: SHIPPED | OUTPATIENT
Start: 2025-06-20

## 2025-06-23 ENCOUNTER — PATIENT OUTREACH (OUTPATIENT)
Dept: CASE MANAGEMENT | Facility: OTHER | Age: 75
End: 2025-06-23
Payer: MEDICARE

## 2025-06-23 NOTE — OUTREACH NOTE
AMBULATORY CASE MANAGEMENT NOTE    Names and Relationships of Patient/Support Persons: Contact: Jose A Villanueva; Relationship: Self -     Patient Outreach    RN-ACM brief outreach with patient engaged in High Risk Case Management.  Patient has been working toward lowering his A1c.  Patient reported no concerns at this time.  He was traveling at the time of our call.  RN-ACM will contact patient another day.     A1C Last 3 Results          8/22/2024    12:03 12/2/2024    14:11 3/6/2025    10:50   HGBA1C Last 3 Results   Hemoglobin A1C 9.2  11.0  9.7          Meghan GELLER  Ambulatory Case Management    6/23/2025, 10:12 EDT

## 2025-06-25 DIAGNOSIS — E11.43 TYPE 2 DIABETES MELLITUS WITH DIABETIC AUTONOMIC NEUROPATHY, WITHOUT LONG-TERM CURRENT USE OF INSULIN: ICD-10-CM

## 2025-06-25 RX ORDER — PREGABALIN 150 MG/1
150 CAPSULE ORAL EVERY 12 HOURS
Qty: 180 CAPSULE | Refills: 0 | Status: SHIPPED | OUTPATIENT
Start: 2025-06-25

## 2025-06-26 ENCOUNTER — OFFICE VISIT (OUTPATIENT)
Dept: FAMILY MEDICINE CLINIC | Facility: CLINIC | Age: 75
End: 2025-06-26
Payer: MEDICARE

## 2025-06-26 VITALS
TEMPERATURE: 99 F | BODY MASS INDEX: 39.8 KG/M2 | HEART RATE: 92 BPM | DIASTOLIC BLOOD PRESSURE: 74 MMHG | HEIGHT: 70 IN | SYSTOLIC BLOOD PRESSURE: 122 MMHG | OXYGEN SATURATION: 98 % | WEIGHT: 278 LBS | RESPIRATION RATE: 18 BRPM

## 2025-06-26 DIAGNOSIS — R39.12 BENIGN PROSTATIC HYPERPLASIA WITH WEAK URINARY STREAM: ICD-10-CM

## 2025-06-26 DIAGNOSIS — N18.31 STAGE 3A CHRONIC KIDNEY DISEASE: ICD-10-CM

## 2025-06-26 DIAGNOSIS — K21.9 GASTROESOPHAGEAL REFLUX DISEASE WITHOUT ESOPHAGITIS: ICD-10-CM

## 2025-06-26 DIAGNOSIS — M48.062 SPINAL STENOSIS, LUMBAR REGION, WITH NEUROGENIC CLAUDICATION: ICD-10-CM

## 2025-06-26 DIAGNOSIS — I38 VALVULAR HEART DISEASE: Chronic | ICD-10-CM

## 2025-06-26 DIAGNOSIS — G47.33 OBSTRUCTIVE SLEEP APNEA: ICD-10-CM

## 2025-06-26 DIAGNOSIS — F51.01 PRIMARY INSOMNIA: ICD-10-CM

## 2025-06-26 DIAGNOSIS — E66.01 MORBID (SEVERE) OBESITY DUE TO EXCESS CALORIES: ICD-10-CM

## 2025-06-26 DIAGNOSIS — Z79.4 TYPE 2 DIABETES MELLITUS WITH HYPERGLYCEMIA, WITH LONG-TERM CURRENT USE OF INSULIN: Primary | ICD-10-CM

## 2025-06-26 DIAGNOSIS — Z87.891 HISTORY OF SMOKING: ICD-10-CM

## 2025-06-26 DIAGNOSIS — I25.10 CORONARY ARTERY DISEASE INVOLVING NATIVE CORONARY ARTERY OF NATIVE HEART WITHOUT ANGINA PECTORIS: Chronic | ICD-10-CM

## 2025-06-26 DIAGNOSIS — I10 PRIMARY HYPERTENSION: ICD-10-CM

## 2025-06-26 DIAGNOSIS — N40.1 BENIGN PROSTATIC HYPERPLASIA WITH WEAK URINARY STREAM: ICD-10-CM

## 2025-06-26 DIAGNOSIS — Z00.00 MEDICARE ANNUAL WELLNESS VISIT, SUBSEQUENT: ICD-10-CM

## 2025-06-26 DIAGNOSIS — E11.65 TYPE 2 DIABETES MELLITUS WITH HYPERGLYCEMIA, WITH LONG-TERM CURRENT USE OF INSULIN: Primary | ICD-10-CM

## 2025-06-26 DIAGNOSIS — Z12.5 PROSTATE CANCER SCREENING: ICD-10-CM

## 2025-06-26 DIAGNOSIS — M54.16 LUMBAR RADICULOPATHY: ICD-10-CM

## 2025-06-26 DIAGNOSIS — E78.2 MIXED HYPERLIPIDEMIA: ICD-10-CM

## 2025-06-26 DIAGNOSIS — Z98.84 GASTRIC BANDING STATUS: ICD-10-CM

## 2025-06-26 LAB
EXPIRATION DATE: ABNORMAL
EXPIRATION DATE: NORMAL
HBA1C MFR BLD: 9 % (ref 4.5–5.7)
Lab: ABNORMAL
Lab: NORMAL
POC ALBUMIN, URINE: 10 MG/L
POC CREATININE, URINE: 50 MG/DL
POC URINE ALB/CREA RATIO: <30

## 2025-06-26 PROCEDURE — 3074F SYST BP LT 130 MM HG: CPT | Performed by: NURSE PRACTITIONER

## 2025-06-26 PROCEDURE — 1160F RVW MEDS BY RX/DR IN RCRD: CPT | Performed by: NURSE PRACTITIONER

## 2025-06-26 PROCEDURE — 83036 HEMOGLOBIN GLYCOSYLATED A1C: CPT | Performed by: NURSE PRACTITIONER

## 2025-06-26 PROCEDURE — 99397 PER PM REEVAL EST PAT 65+ YR: CPT | Performed by: NURSE PRACTITIONER

## 2025-06-26 PROCEDURE — G0439 PPPS, SUBSEQ VISIT: HCPCS | Performed by: NURSE PRACTITIONER

## 2025-06-26 PROCEDURE — 82044 UR ALBUMIN SEMIQUANTITATIVE: CPT | Performed by: NURSE PRACTITIONER

## 2025-06-26 PROCEDURE — 96160 PT-FOCUSED HLTH RISK ASSMT: CPT | Performed by: NURSE PRACTITIONER

## 2025-06-26 PROCEDURE — 3052F HG A1C>EQUAL 8.0%<EQUAL 9.0%: CPT | Performed by: NURSE PRACTITIONER

## 2025-06-26 PROCEDURE — 1159F MED LIST DOCD IN RCRD: CPT | Performed by: NURSE PRACTITIONER

## 2025-06-26 PROCEDURE — 82570 ASSAY OF URINE CREATININE: CPT | Performed by: NURSE PRACTITIONER

## 2025-06-26 PROCEDURE — 3078F DIAST BP <80 MM HG: CPT | Performed by: NURSE PRACTITIONER

## 2025-06-26 RX ORDER — CALCIUM CITRATE/VITAMIN D3 200MG-6.25
TABLET ORAL
Qty: 100 EACH | Refills: 2 | Status: SHIPPED | OUTPATIENT
Start: 2025-06-26

## 2025-06-26 RX ORDER — PEN NEEDLE, DIABETIC 30 GX3/16"
1 NEEDLE, DISPOSABLE MISCELLANEOUS DAILY
Qty: 100 EACH | Refills: 1 | Status: SHIPPED | OUTPATIENT
Start: 2025-06-26

## 2025-06-26 RX ORDER — VALACYCLOVIR HYDROCHLORIDE 1 G/1
1000 TABLET, FILM COATED ORAL 3 TIMES DAILY
Qty: 21 TABLET | Refills: 0 | Status: SHIPPED | OUTPATIENT
Start: 2025-06-26 | End: 2025-07-03

## 2025-06-26 NOTE — PROGRESS NOTES
The ABCs of the Annual Wellness Visit  Subsequent Medicare Wellness Visit    Chief Complaint   Patient presents with    Medicare Wellness-subsequent      Subjective    History of Present Illness:  Jose A Villanueva is a 74 y.o. male who presents for a Subsequent Medicare Wellness Visit and annual physical exam.     The following portions of the patient's history were reviewed and   updated as appropriate: allergies, current medications, past family history, past medical history, past social history, past surgical history, and problem list.    Compared to one year ago, the patient feels his physical   health is better.    Compared to one year ago, the patient feels his mental   health is better.    Recent Hospitalizations:  He was not admitted to the hospital during the last year.       Current Medical Providers:  Patient Care Team:  Radha Asher APRN as PCP - General (Family Medicine)  Abhay Baker MD as Consulting Physician (Endocrinology)  Meghan Alvarez RN as Ambulatory  (Population Health)  Hilary Smith PA as Physician Assistant (Endocrinology)  Jillian Pratt DPM (Podiatry)  Kevin Stovall II, MD (Pain Medicine)  Carla Kaye APRN as Nurse Practitioner (Nurse Practitioner)    Outpatient Medications Prior to Visit   Medication Sig Dispense Refill    amLODIPine (NORVASC) 10 MG tablet TAKE 1 TABLET EVERY DAY 90 tablet 3    aspirin 81 MG oral suspension       atorvastatin (LIPITOR) 40 MG tablet TAKE 1 TABLET EVERY DAY 90 tablet 3    Blood Glucose Monitoring Suppl (D-Care Glucometer) w/Device kit 1 kit 3 (Three) Times a Day. Indications: may substitute for any glucometer kit that is approved and availabe 1 kit 0    Continuous Glucose Sensor (Dexcom G6 Sensor) Use Every 10 (Ten) Days. 3 each 2    empagliflozin (Jardiance) 10 MG tablet tablet Take 1 tablet by mouth Daily. 90 tablet 3    fenofibrate 160 MG tablet Take 1 tablet by mouth Daily.      glipizide (GLUCOTROL) 10 MG  tablet TAKE 1 TABLET TWICE DAILY BEFORE MEALS 180 tablet 3    hydroCHLOROthiazide 25 MG tablet TAKE 1 TABLET EVERY DAY 90 tablet 3    Insulin Glargine (Lantus SoloStar) 100 UNIT/ML injection pen Inject 80 Units under the skin into the appropriate area as directed Every Night. INJECT 60 TO 80 UNITS UNDER THE SKIN ONE TIME DAILY AS DIRECTED 15 mL 3    Lancets (onetouch ultrasoft) lancets Take blood sugar once daily E11.9 100 each 2    LORazepam (ATIVAN) 2 MG tablet TAKE 1 TABLET EVERY 6 HOURS AS NEEDED FOR ANXIETY 60 tablet 0    magnesium oxide (MAG-OX) 400 MG tablet Take 1 tablet by mouth 2 (Two) Times a Day.      metoprolol tartrate (LOPRESSOR) 100 MG tablet Take 1 tablet by mouth Daily. (Patient taking differently: Take 1 tablet by mouth 2 (Two) Times a Day.) 30 tablet 2    multivitamin with minerals tablet tablet Take 1 tablet by mouth Daily. Centrum Senior  GlucoRevive (McDonald and cinnamon)      nystatin (MYCOSTATIN) 529312 UNIT/GM cream APPLY TOPICALLY TO THE APPROPRIATE AREA AS DIRECTED 2 (TWO) TIMES A DAY. 30 g 11    olopatadine (PATANOL) 0.1 % ophthalmic solution Administer 1 drop to both eyes 2 (Two) Times a Day. 5 mL 6    pantoprazole (PROTONIX) 40 MG EC tablet Take 1 tablet by mouth Daily. 90 tablet 1    pregabalin (LYRICA) 150 MG capsule TAKE 1 CAPSULE EVERY 12 HOURS 180 capsule 0    sertraline (ZOLOFT) 50 MG tablet TAKE 1 TABLET EVERY DAY 90 tablet 3    tamsulosin (FLOMAX) 0.4 MG capsule 24 hr capsule TAKE 1 CAPSULE EVERY DAY 90 capsule 3    tiZANidine (ZANAFLEX) 4 MG tablet TAKE 1 TABLET AT NIGHT AS NEEDED FOR MUSCLE SPASM(S) 90 tablet 3    glucose blood (True Metrix Blood Glucose Test) test strip TEST BLOOD SUGAR TWO TIMES DAILY 100 each 2    Semaglutide, 1 MG/DOSE, (OZEMPIC) 2 MG/1.5ML solution pen-injector Inject 1 mg under the skin into the appropriate area as directed 1 (One) Time Per Week. 1.5 mL 3     No facility-administered medications prior to visit.       No opioid medication identified on  active medication list. I have reviewed chart for other potential  high risk medication/s and harmful drug interactions in the elderly.        Aspirin is on active medication list. Aspirin use is indicated based on review of current medical condition/s. Pros and cons of this therapy have been discussed today. Benefits of this medication outweigh potential harm.  Patient has been encouraged to continue taking this medication.  .      Patient Active Problem List   Diagnosis    Incomplete cauda equina syndrome    Lumbar radiculopathy    Type 2 diabetes mellitus with hyperglycemia, with long-term current use of insulin    Stage 3a chronic kidney disease    History of smoking    Primary hypertension    Hyperlipidemia    Heart murmur    Primary insomnia    Spinal stenosis, lumbar region, with neurogenic claudication    Fungal infection of the groin    Obstructive sleep apnea    Annual physical exam    Aortic stenosis, mild    Coronary artery disease involving native coronary artery of native heart without angina pectoris    Other constipation    Acute mucoid otitis media of both ears    Yeast dermatitis    Morbid (severe) obesity due to excess calories    Benign prostatic hyperplasia with weak urinary stream    Gastric banding status    GERD (gastroesophageal reflux disease)    Hip pain, right    Osteoarthritis of hip    Valvular heart disease     Advance Care Planning  Advance Directive is not on file.  ACP discussion was held with the patient during this visit. Patient has an advance directive (not in EMR), copy requested.    Review of Systems   Constitutional:  Negative for chills, fatigue and fever.   HENT:  Positive for hearing loss. Negative for trouble swallowing.    Eyes:  Negative for visual disturbance.   Respiratory:  Negative for cough and shortness of breath.    Cardiovascular:  Negative for chest pain, palpitations and leg swelling.   Gastrointestinal:  Negative for abdominal pain, constipation and diarrhea.  "  Endocrine: Positive for polydipsia and polyuria.   Genitourinary:  Positive for genital sores. Negative for decreased urine volume, difficulty urinating, discharge and urgency.   Musculoskeletal:  Positive for arthralgias.   Neurological:  Positive for confusion. Negative for speech difficulty.   Hematological:  Does not bruise/bleed easily.   Psychiatric/Behavioral:  Negative for agitation, self-injury, sleep disturbance and suicidal ideas. The patient is not nervous/anxious.         Objective    Vitals:    06/26/25 1318   BP: 122/74   BP Location: Left arm   Patient Position: Sitting   Cuff Size: Adult   Pulse: 92   Resp: 18   Temp: 99 °F (37.2 °C)   TempSrc: Temporal   SpO2: 98%   Weight: 126 kg (278 lb)   Height: 177.8 cm (70\")     Estimated body mass index is 39.89 kg/m² as calculated from the following:    Height as of this encounter: 177.8 cm (70\").    Weight as of this encounter: 126 kg (278 lb).           Does the patient have evidence of cognitive impairment? No    Physical Exam  Vitals reviewed.   Constitutional:       Appearance: Normal appearance.   HENT:      Head: Normocephalic and atraumatic.      Right Ear: Tympanic membrane, ear canal and external ear normal.      Left Ear: Tympanic membrane, ear canal and external ear normal.      Nose: Nose normal.      Mouth/Throat:      Mouth: Mucous membranes are moist.      Pharynx: Oropharynx is clear.   Eyes:      Conjunctiva/sclera: Conjunctivae normal.      Pupils: Pupils are equal, round, and reactive to light.   Cardiovascular:      Rate and Rhythm: Normal rate and regular rhythm.      Heart sounds: Murmur heard.   Pulmonary:      Effort: Pulmonary effort is normal.      Breath sounds: Normal breath sounds.   Abdominal:      General: Bowel sounds are normal.      Palpations: Abdomen is soft.      Tenderness: There is no abdominal tenderness. There is no guarding or rebound.      Hernia: No hernia is present.   Musculoskeletal:         General: Normal " range of motion.      Cervical back: Neck supple.   Skin:     General: Skin is warm and dry.      Capillary Refill: Capillary refill takes less than 2 seconds.   Neurological:      Mental Status: He is alert and oriented to person, place, and time.   Psychiatric:         Mood and Affect: Mood normal.         Behavior: Behavior normal.       Lab Results   Component Value Date    HGBA1C 9.0 (A) 2025            HEALTH RISK ASSESSMENT    Smoking Status:  Social History     Tobacco Use   Smoking Status Former    Current packs/day: 0.00    Average packs/day: 1.4 packs/day for 49.9 years (69.5 ttl pk-yrs)    Types: Cigarettes    Start date: 1973    Quit date: 1999    Years since quittin.5   Smokeless Tobacco Never     Alcohol Consumption:  Social History     Substance and Sexual Activity   Alcohol Use Not Currently     Fall Risk Screen:    MONTY Fall Risk Assessment was completed, and patient is at MODERATE risk for falls. Assessment completed on:2025    Depression Screenin/26/2025     1:19 PM   PHQ-2/PHQ-9 Depression Screening   Little interest or pleasure in doing things Not at all   Feeling down, depressed, or hopeless Not at all   How difficult have these problems made it for you to do your work, take care of things at home, or get along with other people? Not difficult at all       Health Habits and Functional and Cognitive Screenin/26/2025     1:19 PM   Functional & Cognitive Status   Do you have difficulty preparing food and eating? No   Do you have difficulty bathing yourself, getting dressed or grooming yourself? No   Do you have difficulty using the toilet? No   Do you have difficulty moving around from place to place? No   Do you have trouble with steps or getting out of a bed or a chair? No   Current Diet Low Carb Diet   Dental Exam Up to date   Eye Exam Up to date   Exercise (times per week) 0 times per week   Current Exercises Include No Regular Exercise   Do you  need help using the phone?  No   Are you deaf or do you have serious difficulty hearing?  No   Do you need help to go to places out of walking distance? No   Do you need help shopping? No   Do you need help preparing meals?  No   Do you need help with housework?  No   Do you need help with laundry? No   Do you need help taking your medications? No   Do you need help managing money? No   Do you ever drive or ride in a car without wearing a seat belt? No   Have you felt unusual fatigue (could be tiredness), stress, anger or loneliness in the last month? No   Who do you live with? Alone   If you need help, do you have trouble finding someone available to you? No   Have you been bothered in the last four weeks by sexual problems? No   Do you have difficulty concentrating, remembering or making decisions? No       Age-appropriate Screening Schedule:  Refer to the list below for future screening recommendations based on patient's age, sex and/or medical conditions. Orders for these recommended tests are listed in the plan section. The patient has been provided with a written plan.    Health Maintenance   Topic Date Due    Hepatitis B (1 of 3 - Risk 3-dose series) Never done    ANNUAL WELLNESS VISIT  05/21/2025    LIPID PANEL  06/20/2025    ZOSTER VACCINE (2 of 2) 02/03/2026 (Originally 8/31/2023)    COVID-19 Vaccine (1 - 2024-25 season) 02/04/2026 (Originally 9/1/2024)    DIABETIC FOOT EXAM  08/22/2025    INFLUENZA VACCINE  10/01/2025    DIABETIC EYE EXAM  12/19/2025    HEMOGLOBIN A1C  12/26/2025    URINE MICROALBUMIN-CREATININE RATIO (uACR)  06/26/2026    COLORECTAL CANCER SCREENING  08/28/2026    TDAP/TD VACCINES (2 - Td or Tdap) 05/21/2034    HEPATITIS C SCREENING  Completed    Pneumococcal Vaccine 50+  Completed    AAA SCREEN ONCE  Completed              Assessment & Plan   CMS Preventative Services Quick Reference  Risk Factors Identified During Encounter  Chronic Pain: Patient has responded well to gabapentin and  PT  Fall Risk-High or Moderate: Discussed Fall Prevention in the home  The above risks/problems have been discussed with the patient.  Follow up actions/plans if indicated are seen below in the Assessment/Plan Section.  Pertinent information has been shared with the patient in the After Visit Summary.    Diagnoses and all orders for this visit:    1. Type 2 diabetes mellitus with hyperglycemia, with long-term current use of insulin (Primary)  Assessment & Plan:  Patient has now established care with endocrinology. They added SGLT-2 jardiance at 10mg daily. Continued ozempic 1mg weekly, lantus 80mg nightly. Diabetic foot exam stable. No confirmed retinopathy    Orders:  -     POC Glycosylated Hemoglobin (Hb A1C)  -     POC Albumin/Creatinine Ratio Urine  -     Cancel: Hemoglobin A1c; Future  -     glucose blood (True Metrix Blood Glucose Test) test strip; Use as instructed  Dispense: 100 each; Refill: 2  -     Insulin Pen Needle (Pen Needles) 31G X 8 MM misc; Use 1 Needle Daily.  Dispense: 100 each; Refill: 1    2. Benign prostatic hyperplasia with weak urinary stream  Assessment & Plan:  Approximately one year ago patient had complaints of frequent nighttime urination, approximately 3-4 episodes. He also noted weak in urinary stream and urgency. He denies any feelings of urinary retention, hematuria or urinary obstruction. He was initiated on tamsulosin 0.5 mg nightly which she states has given him full resolution of his symptom       3. Coronary artery disease involving native coronary artery of native heart without angina pectoris  Assessment & Plan:   Cardiac catheterization      Result Date: 2/11/2023     Chronic total occlusion to the LAD extending from the ostium to the    distal vessel and fed by collaterals from the RCA     Mild nonobstructive coronary artery disease involving the proximal LCx    and proximal to mid RCA.     Mildly elevated LVEDP at 24 mmHg     Successful TR band closure to the right radial  arterial access.       4. Gastric banding status    5. History of smoking  Assessment & Plan:  Patient 1 pack/day smoker for 20 years, stopped in 1999      6. Mixed hyperlipidemia  Assessment & Plan:  Patient continues to utilize daily fenofibrate 160mg and atorvastatin 40mg daily. Rechecking lipids today.       7. Lumbar radiculopathy  Assessment & Plan:  Patient was followed and approved for neurosurgery prior to leaving [place].  MRI reviewed in office today showing L3-5 circumferential disc bulging, ligamentum flavum infolding and facet arthropathy contributing to left subarticular zone impingement of the traversing L5 nerve root, mild spinal canal stenosis and mild bilateral neural foraminal stenosis.  Was evaluated by neurosurgery at [facility] who declined surgery at this time, receiving benefit from pain management       8. Morbid (severe) obesity due to excess calories  Assessment & Plan:  Patient's (Body mass index is 39.89 kg/m².) indicates that they are obese (BMI >30) with health conditions that include obstructive sleep apnea, hypertension, coronary heart disease, diabetes mellitus, dyslipidemias, and GERD . Weight is unchanged. BMI  is above average; BMI management plan is completed. We discussed portion control and increasing exercise.       9. Obstructive sleep apnea  Assessment & Plan:  Patient reports better compliance with PAP uses after getting a new, smaller PAP mask       10. Primary hypertension  Assessment & Plan:  BP well controlled in office today, 122/74. Continue amlodipine 10mg daily, HCTZ 25mg daily, metoprolol 100mg BID      11. Primary insomnia  Assessment & Plan:  Longstanding pattern of insomnia, he is utilizing lorazepam nightly with good benefit for a number of years.  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  He has experienced poor response in the past to medication such as  trazodone, Ambien and Lunesta.  Reviewed Jack with satisfactory findings.  He has controlled substance agreement and up-to-date UDS on file       12. Stage 3a chronic kidney disease  Assessment & Plan:  Patient has been followed by [facility] with a baseline creatinine noted to be 1.5-1.6 range, GFR 47. Rechecking metabolic panel today      13. Valvular heart disease    14. Medicare annual wellness visit, subsequent  -     Cancel: Lipid Panel; Future  -     Cancel: TSH Rfx On Abnormal To Free T4; Future  -     Cancel: T4, Free; Future  -     Lipid Panel; Future  -     T4, Free; Future  -     TSH Rfx On Abnormal To Free T4; Future    15. Prostate cancer screening  -     Cancel: PSA Screen; Future  -     PSA Screen; Future    16. Gastroesophageal reflux disease without esophagitis  Assessment & Plan:  Patient's insurance stopped covering pantoprazole, subsequently he had developed heartburn. Insurance ultimately did approve restarting pantoprazole with complete resolution of his GERD      17. Spinal stenosis, lumbar region, with neurogenic claudication  Assessment & Plan:  He has been evaluated by neurosurgery and pain management for his spinal stenosis. Neurosurgery wanted to avoid surgical intervention due to his weight and uncontrolled A1c, per their report based off of his MRI he does have some advanced level degenerative changes in the L3-4 facets. He was subsequently referred to physical therapy which has helped his pain and mobility issues significantly.       Other orders  -     valACYclovir (Valtrex) 1000 MG tablet; Take 1 tablet by mouth 3 (Three) Times a Day for 7 days.  Dispense: 21 tablet; Refill: 0        Follow Up:   Return in about 3 months (around 9/26/2025) for Next scheduled follow up.     An After Visit Summary and PPPS were made available to the patient.

## 2025-07-02 DIAGNOSIS — Z79.4 TYPE 2 DIABETES MELLITUS WITH HYPERGLYCEMIA, WITH LONG-TERM CURRENT USE OF INSULIN: ICD-10-CM

## 2025-07-02 DIAGNOSIS — E11.65 TYPE 2 DIABETES MELLITUS WITH HYPERGLYCEMIA, WITH LONG-TERM CURRENT USE OF INSULIN: ICD-10-CM

## 2025-07-08 NOTE — ASSESSMENT & PLAN NOTE
Patient continues to utilize daily fenofibrate 160mg and atorvastatin 40mg daily. Rechecking lipids today.

## 2025-07-08 NOTE — ASSESSMENT & PLAN NOTE
Patient was followed and approved for neurosurgery prior to leaving [place].  MRI reviewed in office today showing L3-5 circumferential disc bulging, ligamentum flavum infolding and facet arthropathy contributing to left subarticular zone impingement of the traversing L5 nerve root, mild spinal canal stenosis and mild bilateral neural foraminal stenosis.  Was evaluated by neurosurgery at [facility] who declined surgery at this time, receiving benefit from pain management

## 2025-07-08 NOTE — ASSESSMENT & PLAN NOTE
Patient's (Body mass index is 39.89 kg/m².) indicates that they are obese (BMI >30) with health conditions that include obstructive sleep apnea, hypertension, coronary heart disease, diabetes mellitus, dyslipidemias, and GERD . Weight is unchanged. BMI  is above average; BMI management plan is completed. We discussed portion control and increasing exercise.

## 2025-07-08 NOTE — ASSESSMENT & PLAN NOTE
Patient has been followed by [facility] with a baseline creatinine noted to be 1.5-1.6 range, GFR 47. Rechecking metabolic panel today

## 2025-07-08 NOTE — ASSESSMENT & PLAN NOTE
Patient's insurance stopped covering pantoprazole, subsequently he had developed heartburn. Insurance ultimately did approve restarting pantoprazole with complete resolution of his GERD

## 2025-07-08 NOTE — ASSESSMENT & PLAN NOTE
BP well controlled in office today, 122/74. Continue amlodipine 10mg daily, HCTZ 25mg daily, metoprolol 100mg BID

## 2025-07-08 NOTE — ASSESSMENT & PLAN NOTE
Patient has now established care with endocrinology. They added SGLT-2 jardiance at 10mg daily. Continued ozempic 1mg weekly, lantus 80mg nightly. Diabetic foot exam stable. No confirmed retinopathy

## 2025-07-11 ENCOUNTER — TELEPHONE (OUTPATIENT)
Dept: FAMILY MEDICINE CLINIC | Facility: CLINIC | Age: 75
End: 2025-07-11
Payer: MEDICARE

## 2025-07-11 RX ORDER — NYSTATIN 100000 U/G
1 CREAM TOPICAL 2 TIMES DAILY
Qty: 30 G | Refills: 0 | Status: SHIPPED | OUTPATIENT
Start: 2025-07-11

## 2025-07-11 NOTE — TELEPHONE ENCOUNTER
Caller: Jose A Villanueva    Relationship: Self    Best call back number: [phone]    What is the best time to reach you: ANYTIME     Who are you requesting to speak with (clinical staff, provider,  specific staff member): CLINICAL STAFF     PATIENT STATES THAT HIS PENIS IS REF AND INFLAMED. PATIENT STATES HE HAS BEEN USING ANTI FUNGAL AND ITCH CREAM ON IT AND ITS NOT HELPING. PLEASE CALL TO DISCUSS

## 2025-07-16 DIAGNOSIS — F51.01 PRIMARY INSOMNIA: ICD-10-CM

## 2025-07-16 RX ORDER — LORAZEPAM 2 MG/1
2 TABLET ORAL EVERY 6 HOURS PRN
Qty: 60 TABLET | Refills: 0 | Status: SHIPPED | OUTPATIENT
Start: 2025-07-16

## 2025-07-16 NOTE — TELEPHONE ENCOUNTER
Caller: Jose A Villanueva    Relationship: Self    Best call back number: [phone]     Requested Prescriptions:   Requested Prescriptions     Pending Prescriptions Disp Refills    LORazepam (ATIVAN) 2 MG tablet 60 tablet 0    sertraline (ZOLOFT) 50 MG tablet 90 tablet 3     Sig: Take 1 tablet by mouth Daily.        Pharmacy where request should be sent: [facility] - [facility] [address] - [phone]  - [phone]      Last office visit with prescribing clinician: 6/26/2025   Last telemedicine visit with prescribing clinician: Visit date not found   Next office visit with prescribing clinician: 9/25/2025     Additional details provided by patient: 2 DAYS REMAINING AND ADVISED HE TAKES THE LORAZEPAM EVERY 6 HOURS     Does the patient have less than a 3 day supply:  [x] Yes  [] No    Would you like a call back once the refill request has been completed: [] Yes [x] No    If the office needs to give you a call back, can they leave a voicemail: [] Yes [x] No    Wendy Meng Rep   07/16/25 11:54 EDT

## 2025-07-21 ENCOUNTER — HOSPITAL ENCOUNTER (OUTPATIENT)
Dept: HOSPITAL 22 - LAB | Age: 75
Discharge: HOME | End: 2025-07-21
Payer: MEDICARE

## 2025-07-21 DIAGNOSIS — N52.9: ICD-10-CM

## 2025-07-21 DIAGNOSIS — N40.0: Primary | ICD-10-CM

## 2025-07-21 LAB
BASOPHILS # BLD AUTO: 0.1 K/MM3 (ref 0–0.2)
BASOPHILS NFR BLD AUTO: 0.6 % (ref 0.1–2)
BUN SERPL-MCNC: 22 MG/DL (ref 9–20)
CREAT BLD-SCNC: 1.5 MG/DL (ref 0.66–1.25)
DEPRECATED RDW RBC AUTO: 13.2 % (ref 11.5–17.5)
EOSINOPHIL # BLD AUTO: 0.3 KMM3 (ref 0–0.4)
EOSINOPHIL NFR BLD AUTO: 3.1 % (ref 0.1–12)
ESTIMATED GLOMERULAR FILT RATE: 46 ML/MIN (ref 60–?)
GFR (AFRICAN AMERICAN): 56 ML/MIN (ref 60–?)
HCT VFR BLD AUTO: 45.8 % (ref 42–52)
HGB BLD-MCNC: 15.6 G/DL (ref 14.1–18)
IMM GRANULOCYTES # BLD AUTO: 0.05 10^3UL
IMM GRANULOCYTES NFR BLD AUTO: 0.5 %
LYMPHOCYTES # SPEC AUTO: 3.7 K/MM3 (ref 0.7–4.5)
LYMPHOCYTES %: 36 % (ref 10–50)
MCH RBC QN AUTO: 31 PG (ref 27–31.2)
MCHC RBC-ENTMCNC: 34.1 G/DL (ref 31.8–35.4)
MCV RBC: 90.9 FL (ref 80–94)
MONOCYTES # BLD AUTO: 1.3 K/MM3 (ref 0.1–1)
MONOCYTES NFR BLD AUTO: 12.3 % (ref 1.7–9.3)
NEUTROPHILS # BLD AUTO: 4.8 K/MM3 (ref 1.8–7.8)
NEUTROPHILS NFR BLD AUTO: 47.5 % (ref 37–80)
NUCLEATED RED BLOOD CELLS #: 0 10^3/UL
NUCLEATED RED BLOOD CELLS %: 0 %
PLATELET # BLD: 202 K/MM3 (ref 142–424)
PMV BLD AUTO: 11.5 FL (ref 7.4–10.4)
RBC # BLD AUTO: 5.04 M/MM3 (ref 4.6–6.2)
RED CELL DISTRIBUTION WIDTH-SD: 43.3 FL
WBC # BLD AUTO: 10.1 K/MM3 (ref 4.8–10.8)
WBC NRBC COR # BLD: (no result) K/MM3 (ref 4.8–10.8)

## 2025-07-21 PROCEDURE — 82565 ASSAY OF CREATININE: CPT

## 2025-07-21 PROCEDURE — 85025 COMPLETE CBC W/AUTO DIFF WBC: CPT

## 2025-07-21 PROCEDURE — 84520 ASSAY OF UREA NITROGEN: CPT

## 2025-07-21 PROCEDURE — 36415 COLL VENOUS BLD VENIPUNCTURE: CPT

## 2025-08-01 DIAGNOSIS — F51.01 PRIMARY INSOMNIA: ICD-10-CM

## 2025-08-01 RX ORDER — LORAZEPAM 2 MG/1
2 TABLET ORAL EVERY 6 HOURS PRN
Qty: 60 TABLET | Refills: 0 | Status: SHIPPED | OUTPATIENT
Start: 2025-08-01

## 2025-08-01 NOTE — TELEPHONE ENCOUNTER
Caller: Jose A Villanueva    Relationship: Self    Best call back number:   Telephone Information:   Mobile [phone]        Requested Prescriptions:   Requested Prescriptions     Pending Prescriptions Disp Refills    LORazepam (ATIVAN) 2 MG tablet 60 tablet 0     Sig: Take 1 tablet by mouth Every 6 (Six) Hours As Needed for Anxiety.        Pharmacy where request should be sent: [facility] - [facility] [address] - [phone]  - [phone]      Last office visit with prescribing clinician: 6/26/2025   Last telemedicine visit with prescribing clinician: Visit date not found   Next office visit with prescribing clinician: 9/25/2025     Additional details provided by patient:     Does the patient have less than a 3 day supply:  [x] Yes  [] No    Would you like a call back once the refill request has been completed: [] Yes [x] No    If the office needs to give you a call back, can they leave a voicemail: [] Yes [x] No    Wendy Chau Rep   08/01/25 08:45 EDT

## 2025-08-25 DIAGNOSIS — F51.01 PRIMARY INSOMNIA: ICD-10-CM

## 2025-08-25 RX ORDER — LORAZEPAM 2 MG/1
2 TABLET ORAL EVERY 6 HOURS PRN
Qty: 60 TABLET | Refills: 0 | Status: SHIPPED | OUTPATIENT
Start: 2025-08-25